# Patient Record
Sex: FEMALE | Race: WHITE | HISPANIC OR LATINO | Employment: OTHER | URBAN - METROPOLITAN AREA
[De-identification: names, ages, dates, MRNs, and addresses within clinical notes are randomized per-mention and may not be internally consistent; named-entity substitution may affect disease eponyms.]

---

## 2017-01-27 ENCOUNTER — HOSPITAL ENCOUNTER (OUTPATIENT)
Dept: RADIOLOGY | Facility: MEDICAL CENTER | Age: 62
End: 2017-01-27
Attending: OBSTETRICS & GYNECOLOGY
Payer: COMMERCIAL

## 2017-01-27 DIAGNOSIS — Z13.9 SCREENING: ICD-10-CM

## 2017-01-27 PROCEDURE — 77063 BREAST TOMOSYNTHESIS BI: CPT

## 2017-03-12 ENCOUNTER — OFFICE VISIT (OUTPATIENT)
Dept: URGENT CARE | Facility: PHYSICIAN GROUP | Age: 62
End: 2017-03-12
Payer: COMMERCIAL

## 2017-03-12 ENCOUNTER — HOSPITAL ENCOUNTER (OUTPATIENT)
Facility: MEDICAL CENTER | Age: 62
End: 2017-03-12
Attending: FAMILY MEDICINE
Payer: COMMERCIAL

## 2017-03-12 VITALS
HEART RATE: 70 BPM | TEMPERATURE: 99.2 F | DIASTOLIC BLOOD PRESSURE: 68 MMHG | OXYGEN SATURATION: 96 % | HEIGHT: 64 IN | BODY MASS INDEX: 21.68 KG/M2 | WEIGHT: 127 LBS | SYSTOLIC BLOOD PRESSURE: 100 MMHG | RESPIRATION RATE: 18 BRPM

## 2017-03-12 DIAGNOSIS — B35.4 TINEA CORPORIS: ICD-10-CM

## 2017-03-12 PROCEDURE — 99214 OFFICE O/P EST MOD 30 MIN: CPT | Performed by: FAMILY MEDICINE

## 2017-03-12 PROCEDURE — 87102 FUNGUS ISOLATION CULTURE: CPT

## 2017-03-12 PROCEDURE — 99000 SPECIMEN HANDLING OFFICE-LAB: CPT | Performed by: FAMILY MEDICINE

## 2017-03-12 RX ORDER — CLOTRIMAZOLE AND BETAMETHASONE DIPROPIONATE 10; .64 MG/G; MG/G
1 CREAM TOPICAL 2 TIMES DAILY
Qty: 1 TUBE | Refills: 0 | Status: SHIPPED | OUTPATIENT
Start: 2017-03-12 | End: 2017-05-26

## 2017-03-12 NOTE — MR AVS SNAPSHOT
"        Karuna Reid   3/12/2017 3:40 PM   Office Visit   MRN: [id number]    Department:  [facility]   Dept Phone:  [phone]    Description:  Female : 1955   Provider:  Ryan Valladares M.D.           Reason for Visit     Rash red bumps on left side of face since surgery in october, now rash on right side in [id number], doesn't hurt just there      Allergies as of 3/12/2017     Allergen Noted Reactions    Other Drug 2016   Rash    Steroids, caused redness and rash      You were diagnosed with     Tinea corporis   [970392]         Vital Signs     Blood Pressure Pulse Temperature Respirations Height Weight    100/68 mmHg 70 37.3 °C (99.2 °F) 18 1.626 m (5' 4.02\") 57.607 kg (127 lb)    Body Mass Index Oxygen Saturation Smoking Status             21.79 kg/m2 96% Never Smoker          Basic Information     Date Of Birth Sex Race Ethnicity Preferred Language    1955 Female White Non- English      Your appointments     2017  4:35 PM   Established Patient with TRACEY Gonzalez   [facility])    [address], [address]  [place] [address] [phone]           You will be receiving a confirmation call a few days before your appointment from our automated call confirmation system.              Problem List              ICD-10-CM Priority Class Noted - Resolved    Menopausal and postmenopausal disorder N95.9   Unknown - Present    Skin lesion of left arm L98.9   2014 - Present    Common wart B07.8   2014 - Present    Carpal tunnel syndrome G56.00   2014 - Present    Fatigue R53.83   2015 - Present    Uterine prolapse N81.4   2015 - Present    Routine health maintenance Z00.00   2015 - Present    Rectocele N81.6   10/19/2016 - Present    Cystocele N81.10   10/19/2016 - Present      Health Maintenance        Date Due Completion Dates    IMM INFLUENZA (1) 2016 ---    PAP SMEAR 2016 " 12/20/2013 (Prv Comp)    Override on 12/20/2013: Previously completed (abnormal - 3 month follow up,  Dr. Rowley  )    MAMMOGRAM 1/27/2018 1/27/2017, 1/18/2016, 1/19/2015, 1/20/2014, 1/21/2013, 1/16/2012, 1/17/2011, 12/10/2009, 12/10/2009, 12/9/2008, 12/9/2008, 12/4/2007, 12/4/2007, 11/14/2006, 10/19/2005    IMM DTaP/Tdap/Td Vaccine (2 - Td) 7/14/2020 7/14/2010    COLONOSCOPY 10/30/2020 10/30/2015, 9/9/2005            Current Immunizations     SHINGLES VACCINE 1/20/2014    Tdap Vaccine 7/14/2010      Below and/or attached are the medications your provider expects you to take. Review all of your home medications and newly ordered medications with your provider and/or pharmacist. Follow medication instructions as directed by your provider and/or pharmacist. Please keep your medication list with you and share with your provider. Update the information when medications are discontinued, doses are changed, or new medications (including over-the-counter products) are added; and carry medication information at all times in the event of emergency situations     Allergies:  OTHER DRUG - Rash               Medications  Valid as of: March 12, 2017 -  4:39 PM    Generic Name Brand Name Tablet Size Instructions for use    Ascorbic Acid (Tab) ascorbic acid 500 MG Take 500 mg by mouth every day.        Biotin (Cap) Biotin 5000 MCG Take 1 Cap by mouth every day.        Calcium Carb-Cholecalciferol (Tab) Calcium + D3 600-200 MG-UNIT Take 1 Tab by mouth every day.        Cholecalciferol (Tab) cholecalciferol 1000 UNIT Take 1,000 Units by mouth every day.        Clotrimazole-Betamethasone (Cream) LOTRISONE 1-0.05 % Apply 1 Application to affected area(s) 2 times a day.        Cyanocobalamin (Tab) VITAMIN B-12 500 MCG Take 500 mcg by mouth every day.        CycloSPORINE (Emulsion) RESTASIS 0.05 % Place 1 Drop in both eyes 2 times a day.        Lutein-Zeaxanthin (Cap) Lutein-Zeaxanthin 20-1 MG Take 1 Cap by mouth every day.         Multiple Vitamins-Minerals (Tab) WOMENS MULTI VITAMIN & MINERAL  Take 1 Tab by mouth every day.        NON SPECIFIED   Place 1 Drop in both eyes 3 times a day as needed. OTC eye drops alternating with Restasis        Omega-3 Fatty Acids (Cap) OMEGA 3 FA 1000 MG Take 1,000 mg by mouth every day.        .                 Medicines prescribed today were sent to:     [facility]/PHARMACY #[id number] - [place], [place] - [address]    [address] [place] [address] [place] [address]    Phone: [phone] Fax: [phone]    Open 24 Hours?: No      Medication refill instructions:       If your prescription bottle indicates you have medication refills left, it is not necessary to call your provider’s office. Please contact your pharmacy and they will refill your medication.    If your prescription bottle indicates you do not have any refills left, you may request refills at any time through one of the following ways: The online [employer] system (except Urgent Care), by calling your provider’s office, or by asking your pharmacy to contact your provider’s office with a refill request. Medication refills are processed only during regular business hours and may not be available until the next business day. Your provider may request additional information or to have a follow-up visit with you prior to refilling your medication.   *Please Note: Medication refills are assigned a new Rx number when refilled electronically. Your pharmacy may indicate that no refills were authorized even though a new prescription for the same medication is available at the pharmacy. Please request the medicine by name with the pharmacy before contacting your provider for a refill.        Your To Do List     Future Labs/Procedures Complete By Expires    FUNGAL CULTURE  As directed 9/12/2017         [employer] Access Code: Activation code not generated  Current Pumant Status: Active

## 2017-03-13 DIAGNOSIS — B35.4 TINEA CORPORIS: ICD-10-CM

## 2017-03-13 NOTE — PROGRESS NOTES
Chief Complaint   Patient presents with   • Rash     red bumps on left side of face since surgery in october, now rash on right side in [id number], doesn't hurt just there                Rash  This is a new problem. The current episode started in the past 7 days. The problem is unchanged.  Location: rt side of face. The rash is characterized by redness  . pt was exposed to nothing. Pertinent negatives include no itchiness,  cough, diarrhea, fatigue, fever, joint pain, rhinorrhea, shortness of breath or sore throat. Past treatments include: none.          Social History   Substance Use Topics   • Smoking status: Never Smoker    • Smokeless tobacco: Never Used      Comment: 1990   • Alcohol Use: Yes      Comment: 1 per week         Past Medical History   Diagnosis Date   • Menopausal and postmenopausal disorder    • GERD (gastroesophageal reflux disease)    • Urinary bladder disorder        Current Outpatient Prescriptions on File Prior to Visit   Medication Sig Dispense Refill   • NON SPECIFIED Place 1 Drop in both eyes 3 times a day as needed. OTC eye drops alternating with Restasis     • cyclosporin (RESTASIS) 0.05 % ophthalmic emulsion Place 1 Drop in both eyes 2 times a day.     • ascorbic acid (ASCORBIC ACID) 500 MG Tab Take 500 mg by mouth every day.     • Biotin 5000 MCG Cap Take 1 Cap by mouth every day.     • cyanocobalamin (VITAMIN B-12) 500 MCG Tab Take 500 mcg by mouth every day.     • Calcium Carb-Cholecalciferol (CALCIUM + D3) 600-200 MG-UNIT Tab Take 1 Tab by mouth every day.     • Lutein-Zeaxanthin 20-1 MG Cap Take 1 Cap by mouth every day.     • docosahexanoic acid (OMEGA 3 FA) 1000 MG CAPS Take 1,000 mg by mouth every day.     • vitamin D (CHOLECALCIFEROL) 1000 UNIT TABS Take 1,000 Units by mouth every day.     • Multiple Vitamins-Minerals (WOMENS MULTI VITAMIN & MINERAL) TABS Take 1 Tab by mouth every day.       No current facility-administered medications on file prior to visit.       Review of  "Systems   Constitutional: Negative for fever and fatigue.   HENT: Negative for rhinorrhea and sore throat.    Respiratory: Negative for cough and shortness of breath.    Cardiovascular: Negative for chest pain.   Gastrointestinal: Negative for diarrhea.   Musculoskeletal: Negative for joint pain.   Skin: Positive for rash.   Neurological: Negative for dizziness.   All other systems reviewed and are negative.         Objective:     Blood pressure 100/68, pulse 70, temperature 37.3 °C (99.2 °F), resp. rate 18, height 1.626 m (5' 4.02\"), weight 57.607 kg (127 lb), SpO2 96 %.      Physical Exam   Constitutional: pt is oriented to person, place, and time. Pt appears well-developed and well-nourished. No distress.   HENT:   Head: Normocephalic and atraumatic.   Eyes: Conjunctivae are normal.   Cardiovascular: Normal rate, regular rhythm and normal heart sounds.    Pulmonary/Chest: Effort normal and breath sounds normal. No respiratory distress. Pt has no wheezes. Pt has no rales.   Neurological: pt is alert and oriented to person, place, and time. No cranial nerve deficit.   Skin: Skin is warm. Rash (flat, erythematous scaly plaque (1), RIGHT CHEEK, aprox 1cm in diameter) noted. Pt is not diaphoretic. There is erythema.   Nursing note and vitals reviewed.              Assessment/Plan:     1. Tinea corporis  Trial of antifungal   - clotrimazole-betamethasone (LOTRISONE) 1-0.05 % Cream; Apply 1 Application to affected area(s) 2 times a day.  Dispense: 1 Tube; Refill: 0  - FUNGAL CULTURE; Future        1. Eczema     - triamcinolone acetonide (KENALOG) 0.1 % Ointment; Apply 1 Application to affected area(s) 2 times a day.  Dispense: 1 Tube; Refill: 0           "

## 2017-04-10 LAB
FUNGUS SPEC CULT: NORMAL
SIGNIFICANT IND 70042: NORMAL
SOURCE SOURCE: NORMAL

## 2017-05-23 ENCOUNTER — RX ONLY (OUTPATIENT)
Age: 62
Setting detail: RX ONLY
End: 2017-05-23

## 2017-05-25 ENCOUNTER — TELEPHONE (OUTPATIENT)
Dept: MEDICAL GROUP | Facility: PHYSICIAN GROUP | Age: 62
End: 2017-05-25

## 2017-05-25 NOTE — Clinical Note
[email or web address]  Fabrice Carroll D.O.  [address]  [place] [address]  Fax: [phone]   Authorization for Release/Disclosure of   Protected Health Information   Name: KARUNA ABRAHAM : 1955 SSN: [id number]   Address: [address] [address]  [place] [address] Phone:    [phone] (home) [phone] (work)   I authorize the entity listed below to release/disclose the PHI below to:   [email or web address] D.O. and Fabrice Carroll D.O.   Provider or Entity Name:  Lenny Cazares M.D.     Address   City, State, Zip   Phone:      Fax:  [phone]   Reason for request: continuity of care   Information to be released:    [  ] LAST COLONOSCOPY,  including any PATH REPORT and follow-up  [  ] LAST FIT/COLOGUARD RESULT [  ] LAST DEXA  [  ] LAST MAMMOGRAM  [ XXX ] LAST PAP  [  ] LAST LABS [  ] RETINA EXAM REPORT  [  ] IMMUNIZATION RECORDS  [  ] Release all info      [  ] Check here and initial the line next to each item to release ALL health information INCLUDING  _____ Care and treatment for drug and / or alcohol abuse  _____ HIV testing, infection status, or AIDS  _____ Genetic Testing    DATES OF SERVICE OR TIME PERIOD TO BE DISCLOSED: _____________  I understand and acknowledge that:  * This Authorization may be revoked at any time by you in writing, except if your health information has already been used or disclosed.  * Your health information that will be used or disclosed as a result of you signing this authorization could be re-disclosed by the recipient. If this occurs, your re-disclosed health information may no longer be protected by State or Federal laws.  * You may refuse to sign this Authorization. Your refusal will not affect your ability to obtain treatment.  * This Authorization becomes effective upon signing and will  on (date) __________.      If no date is indicated, this Authorization will  one (1) year from the signature date.    Name: Karuna Rivera  Jeanette    Signature: For continuity of care.    Date:     5/25/2017       PLEASE FAX REQUESTED RECORDS BACK TO: [phone]

## 2017-05-25 NOTE — TELEPHONE ENCOUNTER
ESTABLISHED PATIENT PRE-VISIT PLANNING     Note: Patient will not be contacted if there is no indication to call.     1.  Reviewed note from last office visit with PCP and/or other med group provider: Yes    2.  If any orders were placed at last visit, do we have Results/Consult Notes?        •  Labs - Labs were not ordered at last office visit.       •  Imaging - Imaging was not ordered at last office visit.       •  Referrals - No referrals were ordered at last office visit.    3.  Immunizations were updated in [facility] using [employer]?: Yes       •  [employer] Recommendations: Patient is up to date on all vaccines    4.  Patient is due for the following Health Maintenance Topics:   Health Maintenance Due   Topic Date Due   • PAP SMEAR  12/20/2016       Requested medical records regarding pap from Lenny Cazares M.D.. This was faxed via [employer] on 5/25/2017 12:22 PM to: [phone]    5.  Patient was not informed to arrive 15 min prior to their scheduled appointment and bring in their medication bottles.

## 2017-05-26 ENCOUNTER — OFFICE VISIT (OUTPATIENT)
Dept: MEDICAL GROUP | Facility: PHYSICIAN GROUP | Age: 62
End: 2017-05-26
Payer: COMMERCIAL

## 2017-05-26 VITALS
TEMPERATURE: 99.3 F | RESPIRATION RATE: 16 BRPM | DIASTOLIC BLOOD PRESSURE: 64 MMHG | OXYGEN SATURATION: 97 % | BODY MASS INDEX: 20.83 KG/M2 | SYSTOLIC BLOOD PRESSURE: 110 MMHG | HEART RATE: 84 BPM | WEIGHT: 122 LBS | HEIGHT: 64 IN

## 2017-05-26 DIAGNOSIS — Z13.6 SCREENING FOR CARDIOVASCULAR CONDITION: ICD-10-CM

## 2017-05-26 DIAGNOSIS — Z00.00 ANNUAL PHYSICAL EXAM: ICD-10-CM

## 2017-05-26 DIAGNOSIS — N32.89 BLADDER SPASM: ICD-10-CM

## 2017-05-26 PROCEDURE — 99396 PREV VISIT EST AGE 40-64: CPT | Performed by: FAMILY MEDICINE

## 2017-05-26 ASSESSMENT — PATIENT HEALTH QUESTIONNAIRE - PHQ9: CLINICAL INTERPRETATION OF PHQ2 SCORE: 0

## 2017-05-26 NOTE — PROGRESS NOTES
Subjective:     Chief Complaint   Patient presents with   • Annual Exam       Karuna Reid is a 61 y.o. female here today to [facility] and for annual exam.    She reports urinary symptoms since hysterectomy with bladder sling. Pt reports 1-2 episodes of nocturia a night.  Pt reports feeling that she can't fully empty the her bladder. Patient also experiences bladder pressure. She notes occasional slight incontinence. She denies stress incontinence, overflow incontinence, or urgency incontinence. She denies dysuria, hematuria, fevers, chills, or flank pain.    Allergies   Allergen Reactions   • Other Drug Rash     Steroids, caused redness and rash     Current medicines (including changes today)  Current Outpatient Prescriptions   Medication Sig Dispense Refill   • cyclosporin (RESTASIS) 0.05 % ophthalmic emulsion Place 1 Drop in both eyes 2 times a day.       No current facility-administered medications for this visit.     Social History   Substance Use Topics   • Smoking status: Former Smoker -- 1.00 packs/day for 23 years     Types: Cigarettes     Start date: 01/01/1970     Quit date: 01/01/1993   • Smokeless tobacco: Never Used      Comment: continue to avoid   • Alcohol Use: 0.6 oz/week     1 Standard drinks or equivalent per week     Family Status   Relation Status Death Age   • Mother Alive      hypothyroid     Family History   Problem Relation Age of Onset   • Psychiatry Mother      Alzheimer's   • Cancer Maternal Aunt      breast cancer     She    has a past medical history of Menopausal and postmenopausal disorder and Urinary bladder disorder.        ROS   GEN: no weight loss, fevers, or chills  HEENT: no blurry vision or change in vision, no ear pain, no difficulty swallowing, no throat pain, no runny nose, no nasal congestion  Resp: no shortness of breath, no cough  CV: no racing heart, no irregular beats, no chest pain  Abd: no nausea, no vomiting, no diarrhea, no constipation, no blood in stool, no  "dark stools, no incontinence  : no dysuria, no hematuria,, no increased frequency  MSK: no muscle aches, no joint pain, no limited motion  Neuro: no headaches, no dizziness, no LOC, no weakness, no numbness/tingling       Objective:     Blood pressure 110/64, pulse 84, temperature 37.4 °C (99.3 °F), resp. rate 16, height 1.626 m (5' 4.02\"), weight 55.339 kg (122 lb), SpO2 97 %. Body mass index is 20.93 kg/(m^2).   Physical Exam:  Constitutional: Alert, no distress.  Skin: Warm, dry, good turgor, no rashes in visible areas.  Eye: Equal, round and reactive, conjunctiva clear, lids normal.  ENMT: Lips without lesions, oropharynx non-erythematous, no exudate, moist oral mucosa, bilateral tympanic membranes: No bulging, no retraction, no fluid, nonerythematous, positive light reflex, external auditory canals: Clear, scant cerumen, nonerythematous  Neck: Trachea midline, no masses, no thyromegaly. No cervical or supraclavicular lymphadenopathy. Full ROM, surgical scars  Respiratory: Unlabored respiratory effort, good air movement, lungs clear to auscultation, no wheezes, no ronchi.  Cardiovascular:RRR, +S1, S2, no murmur, no peripheral edema, pedal and radial pulses equal and intact bilat  Abdomen: Soft, non-tender, no masses, no hepatosplenomegaly.  MSK:5/5 muscle strength in upper extremities as well as lower extremity bilaterally  Psych: Alert and oriented x3, appropriate affect and mood.  Neuro: CN2-12 intact, no gross motor or sensory deficits      Assessment and Plan:   The following treatment plan was discussed    1. Annual physical exam      2. Bladder spasm  Recommend home exercises to improve pelvic floor.    3. Screening for cardiovascular condition  - LIPID PROFILE; Future      Followup: Return in about 1 year (around 5/26/2018) for Annual.    Please note that this dictation was created using voice recognition software. I have made every reasonable attempt to correct obvious errors, but I expect that there " are errors of grammar and possibly content that I did not discover before finalizing the note.

## 2017-05-26 NOTE — MR AVS SNAPSHOT
"        Karuna Reid   2017 4:00 PM   Office Visit   MRN: [id number]    Department:  [facility]   Dept Phone:  [phone]    Description:  Female : 1955   Provider:  Francisca Shanks D.O.           Reason for Visit     Establish Care           Allergies as of 2017     Allergen Noted Reactions    Other Drug 2016   Rash    Steroids, caused redness and rash      You were diagnosed with     Bladder spasm   [368844]       Screening for cardiovascular condition   [247174]         Vital Signs     Blood Pressure Pulse Temperature Respirations Height Weight    110/64 mmHg 84 37.4 °C (99.3 °F) 16 1.626 m (5' 4.02\") 55.339 kg (122 lb)    Body Mass Index Oxygen Saturation Smoking Status             20.93 kg/m2 97% Former Smoker         Basic Information     Date Of Birth Sex Race Ethnicity Preferred Language    1955 Female White Non- English      Problem List              ICD-10-CM Priority Class Noted - Resolved    Menopausal and postmenopausal disorder N95.9   Unknown - Present    Carpal tunnel syndrome G56.00   2014 - Present    Fatigue R53.83   2015 - Present      Health Maintenance        Date Due Completion Dates    MAMMOGRAM 2018, 2016, 2015, 2014, 2013, 2012, 2011, 12/10/2009, 12/10/2009, 2008, 2008, 2007, 2007, 2006, 10/19/2005    IMM DTaP/Tdap/Td Vaccine (2 - Td) 2020    COLONOSCOPY 10/30/2020 10/30/2015, 2005            Current Immunizations     SHINGLES VACCINE 2014    Tdap Vaccine 2010      Below and/or attached are the medications your provider expects you to take. Review all of your home medications and newly ordered medications with your provider and/or pharmacist. Follow medication instructions as directed by your provider and/or pharmacist. Please keep your medication list with you and share with your provider. Update the information when medications are " discontinued, doses are changed, or new medications (including over-the-counter products) are added; and carry medication information at all times in the event of emergency situations     Allergies:  OTHER DRUG - Rash               Medications  Valid as of: May 26, 2017 -  5:21 PM    Generic Name Brand Name Tablet Size Instructions for use    CycloSPORINE (Emulsion) RESTASIS 0.05 % Place 1 Drop in both eyes 2 times a day.        .                 Medicines prescribed today were sent to:     [facility]/PHARMACY #[id number] - [place], [place] - [address]    [address] [place] [address]    Phone: [phone] Fax: [phone]    Open 24 Hours?: No      Medication refill instructions:       If your prescription bottle indicates you have medication refills left, it is not necessary to call your provider’s office. Please contact your pharmacy and they will refill your medication.    If your prescription bottle indicates you do not have any refills left, you may request refills at any time through one of the following ways: The online [employer] system (except Urgent Care), by calling your provider’s office, or by asking your pharmacy to contact your provider’s office with a refill request. Medication refills are processed only during regular business hours and may not be available until the next business day. Your provider may request additional information or to have a follow-up visit with you prior to refilling your medication.   *Please Note: Medication refills are assigned a new Rx number when refilled electronically. Your pharmacy may indicate that no refills were authorized even though a new prescription for the same medication is available at the pharmacy. Please request the medicine by name with the pharmacy before contacting your provider for a refill.        Your To Do List     Future Labs/Procedures Complete By Expires    LIPID PROFILE  As directed 5/26/2018         [employer] Access Code: Activation code not generated  Current  MyChart Status: Active

## 2017-06-17 ENCOUNTER — HOSPITAL ENCOUNTER (OUTPATIENT)
Dept: LAB | Facility: MEDICAL CENTER | Age: 62
End: 2017-06-17
Attending: FAMILY MEDICINE
Payer: COMMERCIAL

## 2017-06-17 DIAGNOSIS — Z13.6 SCREENING FOR CARDIOVASCULAR CONDITION: ICD-10-CM

## 2017-06-17 LAB
CHOLEST SERPL-MCNC: 175 MG/DL (ref 100–199)
HDLC SERPL-MCNC: 68 MG/DL
LDLC SERPL CALC-MCNC: 92 MG/DL
TRIGL SERPL-MCNC: 76 MG/DL (ref 0–149)

## 2017-06-17 PROCEDURE — 36415 COLL VENOUS BLD VENIPUNCTURE: CPT

## 2017-06-17 PROCEDURE — 80061 LIPID PANEL: CPT

## 2017-09-28 ENCOUNTER — APPOINTMENT (RX ONLY)
Dept: URBAN - METROPOLITAN AREA CLINIC 4 | Facility: CLINIC | Age: 62
Setting detail: DERMATOLOGY
End: 2017-09-28

## 2017-09-28 DIAGNOSIS — D22 MELANOCYTIC NEVI: ICD-10-CM

## 2017-09-28 DIAGNOSIS — L81.4 OTHER MELANIN HYPERPIGMENTATION: ICD-10-CM

## 2017-09-28 DIAGNOSIS — L82.1 OTHER SEBORRHEIC KERATOSIS: ICD-10-CM

## 2017-09-28 PROBLEM — D48.5 NEOPLASM OF UNCERTAIN BEHAVIOR OF SKIN: Status: ACTIVE | Noted: 2017-09-28

## 2017-09-28 PROCEDURE — ? COUNSELING

## 2017-09-28 PROCEDURE — ? BIOPSY BY SHAVE METHOD

## 2017-09-28 PROCEDURE — 99212 OFFICE O/P EST SF 10 MIN: CPT | Mod: 25

## 2017-09-28 PROCEDURE — 11100: CPT

## 2017-09-28 ASSESSMENT — LOCATION SIMPLE DESCRIPTION DERM
LOCATION SIMPLE: LEFT THIGH
LOCATION SIMPLE: LEFT POSTERIOR THIGH
LOCATION SIMPLE: RIGHT POSTERIOR THIGH

## 2017-09-28 ASSESSMENT — LOCATION DETAILED DESCRIPTION DERM
LOCATION DETAILED: LEFT ANTERIOR PROXIMAL THIGH
LOCATION DETAILED: LEFT ANTERIOR DISTAL THIGH
LOCATION DETAILED: RIGHT PROXIMAL POSTERIOR THIGH
LOCATION DETAILED: LEFT DISTAL POSTERIOR THIGH

## 2017-09-28 ASSESSMENT — LOCATION ZONE DERM: LOCATION ZONE: LEG

## 2017-09-28 NOTE — HPI: SKIN LESIONS
Is This A New Presentation, Or A Follow-Up?: Skin Lesion
Have Your Skin Lesions Been Treated?: not been treated
Additional History: When lesion first appeared it was white in color. Now seems to have a dark spot on lesion

## 2017-09-28 NOTE — PROCEDURE: BIOPSY BY SHAVE METHOD
Destruction After The Procedure: Yes
Billing Type: Third-Party Bill
Render Post-Care Instructions In Note?: no
Notification Instructions: Patient will be notified of biopsy results. However, patient instructed to call the office if not contacted within 2 weeks.
Lab: 253
Additional Anesthesia Volume In Cc (Will Not Render If 0): 0
Post-Care Instructions: I reviewed with the patient in detail post-care instructions. Patient is to keep the biopsy site dry overnight, and then apply bacitracin twice daily until healed. Patient may apply hydrogen peroxide soaks to remove any crusting.
Dressing: Band-Aid
Detail Level: Detailed
Wound Care: Petrolatum
Biopsy Method: Personna blade
Curettage Text: The wound bed was treated with curettage after the biopsy was performed.
Cryotherapy Text: The wound bed was treated with cryotherapy after the biopsy was performed.
Anesthesia Volume In Cc: 0.5
Biopsy Type: H and E
Electrodesiccation And Curettage Text: The wound bed was treated with electrodesiccation and curettage after the biopsy was performed.
Body Location Override (Optional - Billing Will Still Be Based On Selected Body Map Location If Applicable): left ant thigh
Lab Facility: 
Electrodesiccation Text: The wound bed was treated with electrodesiccation after the biopsy was performed.
Hemostasis: Electrodesiccation and Aluminum Chloride
Type Of Destruction Used: Curettage
Silver Nitrate Text: The wound bed was treated with silver nitrate after the biopsy was performed.
Anesthesia Type: 1% lidocaine with epinephrine and a 1:10 solution of 8.4% sodium bicarbonate
Consent: Written consent was obtained and risks were reviewed including but not limited to scarring, infection, bleeding, scabbing, incomplete removal, nerve damage and allergy to anesthesia.

## 2017-10-11 ENCOUNTER — APPOINTMENT (OUTPATIENT)
Dept: SOCIAL WORK | Facility: CLINIC | Age: 62
End: 2017-10-11
Payer: COMMERCIAL

## 2017-10-11 PROCEDURE — 90686 IIV4 VACC NO PRSV 0.5 ML IM: CPT | Performed by: REGISTERED NURSE

## 2017-10-11 PROCEDURE — 90471 IMMUNIZATION ADMIN: CPT | Performed by: REGISTERED NURSE

## 2018-01-22 ENCOUNTER — OFFICE VISIT (OUTPATIENT)
Dept: MEDICAL GROUP | Facility: MEDICAL CENTER | Age: 63
End: 2018-01-22
Payer: COMMERCIAL

## 2018-01-22 VITALS
OXYGEN SATURATION: 96 % | WEIGHT: 132 LBS | DIASTOLIC BLOOD PRESSURE: 64 MMHG | SYSTOLIC BLOOD PRESSURE: 124 MMHG | RESPIRATION RATE: 16 BRPM | TEMPERATURE: 98.5 F | BODY MASS INDEX: 22.53 KG/M2 | HEART RATE: 64 BPM | HEIGHT: 64 IN

## 2018-01-22 DIAGNOSIS — M79.605 PAIN OF LEFT LOWER EXTREMITY: ICD-10-CM

## 2018-01-22 DIAGNOSIS — M54.2 NECK PAIN: ICD-10-CM

## 2018-01-22 DIAGNOSIS — M85.80 OSTEOPENIA, UNSPECIFIED LOCATION: ICD-10-CM

## 2018-01-22 PROCEDURE — 99214 OFFICE O/P EST MOD 30 MIN: CPT | Performed by: INTERNAL MEDICINE

## 2018-01-22 RX ORDER — DOCUSATE SODIUM 100 MG/1
100 CAPSULE, LIQUID FILLED ORAL 2 TIMES DAILY
COMMUNITY
End: 2020-07-14

## 2018-01-22 NOTE — PROGRESS NOTES
"CC: Neck pain, leg pain.    HPI:   Karuna presents today with the following.    1. Pain of left lower extremity  Presents complaining of leg pain. She reports left thigh only happening for seconds once per month. No specific relationship to the pain and again is not frequent nor is it severe.    2. Osteopenia, unspecified location  Recently diagnosed with osteopenia bone density by her gynecologist and she was referred to a specialist which she is seeing tomorrow. Unclear what the previous values were.    3. Neck pain  She is complaining of rather persistent neck pain. Denies any specific injury. 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 Denies any numbness or tingling in her extremities and no weakness.    ROS: Denies Chest pain, SOB, LE edema.    Patient Active Problem List    Diagnosis Date Noted   • Osteopenia 01/22/2018   • Fatigue 08/05/2015   • Carpal tunnel syndrome 08/14/2014   • Menopausal and postmenopausal disorder        Current Outpatient Prescriptions   Medication Sig Dispense Refill   • docusate sodium (COLACE) 100 MG Cap Take 100 mg by mouth 2 times a day.     • cyclosporin (RESTASIS) 0.05 % ophthalmic emulsion Place 1 Drop in both eyes 2 times a day.       No current facility-administered medications for this visit.          Allergies as of 01/22/2018 - Reviewed 01/22/2018   Allergen Reaction Noted   • Other drug Rash 09/29/2016        ROS: As per HPI.    /64   Pulse 64   Temp 36.9 °C (98.5 °F)   Resp 16   Ht 1.626 m (5' 4\")   Wt 59.9 kg (132 lb)   SpO2 96%   BMI 22.66 kg/m²      Physical Exam:  Gen:         Alert and oriented, No apparent distress.  Neck:        No Lymphadenopathy or Bruits.  Lungs:     Clear to auscultation bilaterally  CV:          Regular rate and rhythm. No murmurs, rubs or gallops.               Ext:          No clubbing, cyanosis, edema.      Assessment and Plan.   62 y.o. female with the following issues.    1. Pain of left lower " extremity  Unclear what the etiology is but very infrequent no further workup or treatment unless becomes worse.    2. Osteopenia, unspecified location  Will get previous imaging results.    3. Neck pain  Recommended physical therapy and topical anti-inflammatories if not improving.  - REFERRAL TO PHYSICAL THERAPY Reason for Therapy: Eval/Treat/Report

## 2018-01-25 ENCOUNTER — HOSPITAL ENCOUNTER (OUTPATIENT)
Dept: LAB | Facility: MEDICAL CENTER | Age: 63
End: 2018-01-25
Attending: NURSE PRACTITIONER
Payer: COMMERCIAL

## 2018-01-25 LAB
25(OH)D3 SERPL-MCNC: 37 NG/ML (ref 30–100)
ALBUMIN SERPL BCP-MCNC: 4.4 G/DL (ref 3.2–4.9)
ALBUMIN/GLOB SERPL: 1.4 G/DL
ALP SERPL-CCNC: 59 U/L (ref 30–99)
ALT SERPL-CCNC: 20 U/L (ref 2–50)
ANION GAP SERPL CALC-SCNC: 6 MMOL/L (ref 0–11.9)
AST SERPL-CCNC: 22 U/L (ref 12–45)
BASOPHILS # BLD AUTO: 0.5 % (ref 0–1.8)
BASOPHILS # BLD: 0.03 K/UL (ref 0–0.12)
BILIRUB SERPL-MCNC: 0.7 MG/DL (ref 0.1–1.5)
BUN SERPL-MCNC: 17 MG/DL (ref 8–22)
CALCIUM SERPL-MCNC: 9.9 MG/DL (ref 8.5–10.5)
CHLORIDE SERPL-SCNC: 105 MMOL/L (ref 96–112)
CO2 SERPL-SCNC: 29 MMOL/L (ref 20–33)
CREAT SERPL-MCNC: 0.71 MG/DL (ref 0.5–1.4)
EOSINOPHIL # BLD AUTO: 0.11 K/UL (ref 0–0.51)
EOSINOPHIL NFR BLD: 2 % (ref 0–6.9)
ERYTHROCYTE [DISTWIDTH] IN BLOOD BY AUTOMATED COUNT: 43.8 FL (ref 35.9–50)
GLOBULIN SER CALC-MCNC: 3.1 G/DL (ref 1.9–3.5)
GLUCOSE SERPL-MCNC: 85 MG/DL (ref 65–99)
HCT VFR BLD AUTO: 46 % (ref 37–47)
HGB BLD-MCNC: 15.5 G/DL (ref 12–16)
IMM GRANULOCYTES # BLD AUTO: 0.02 K/UL (ref 0–0.11)
IMM GRANULOCYTES NFR BLD AUTO: 0.4 % (ref 0–0.9)
LYMPHOCYTES # BLD AUTO: 2.17 K/UL (ref 1–4.8)
LYMPHOCYTES NFR BLD: 38.6 % (ref 22–41)
MCH RBC QN AUTO: 31 PG (ref 27–33)
MCHC RBC AUTO-ENTMCNC: 33.7 G/DL (ref 33.6–35)
MCV RBC AUTO: 92 FL (ref 81.4–97.8)
MONOCYTES # BLD AUTO: 0.47 K/UL (ref 0–0.85)
MONOCYTES NFR BLD AUTO: 8.4 % (ref 0–13.4)
NEUTROPHILS # BLD AUTO: 2.82 K/UL (ref 2–7.15)
NEUTROPHILS NFR BLD: 50.1 % (ref 44–72)
NRBC # BLD AUTO: 0 K/UL
NRBC BLD-RTO: 0 /100 WBC
PLATELET # BLD AUTO: 173 K/UL (ref 164–446)
PMV BLD AUTO: 11.3 FL (ref 9–12.9)
POTASSIUM SERPL-SCNC: 4.1 MMOL/L (ref 3.6–5.5)
PROT SERPL-MCNC: 7.5 G/DL (ref 6–8.2)
PTH-INTACT SERPL-MCNC: 62.4 PG/ML (ref 14–72)
RBC # BLD AUTO: 5 M/UL (ref 4.2–5.4)
SODIUM SERPL-SCNC: 140 MMOL/L (ref 135–145)
WBC # BLD AUTO: 5.6 K/UL (ref 4.8–10.8)

## 2018-01-25 PROCEDURE — 85025 COMPLETE CBC W/AUTO DIFF WBC: CPT

## 2018-01-25 PROCEDURE — 36415 COLL VENOUS BLD VENIPUNCTURE: CPT

## 2018-01-25 PROCEDURE — 82306 VITAMIN D 25 HYDROXY: CPT

## 2018-01-25 PROCEDURE — 80053 COMPREHEN METABOLIC PANEL: CPT

## 2018-01-25 PROCEDURE — 83970 ASSAY OF PARATHORMONE: CPT

## 2018-01-26 ENCOUNTER — PHYSICAL THERAPY (OUTPATIENT)
Dept: PHYSICAL THERAPY | Facility: REHABILITATION | Age: 63
End: 2018-01-26
Attending: INTERNAL MEDICINE
Payer: COMMERCIAL

## 2018-01-26 DIAGNOSIS — M54.2 NECK PAIN: ICD-10-CM

## 2018-01-26 PROCEDURE — 97014 ELECTRIC STIMULATION THERAPY: CPT

## 2018-01-26 PROCEDURE — 97161 PT EVAL LOW COMPLEX 20 MIN: CPT

## 2018-01-26 ASSESSMENT — ENCOUNTER SYMPTOMS
PAIN SCALE AT HIGHEST: 4
PAIN SCALE AT LOWEST: 0
EXACERBATED BY: KEYBOARDING
ALLEVIATING FACTORS: SITTING DOWN
ALLEVIATING FACTORS: PAIN MEDICATION
QUALITY: PRESSURE
EXACERBATED BY: SITTING
PAIN SCALE: 1
QUALITY: DULL ACHE

## 2018-01-26 NOTE — OP THERAPY EVALUATION
Outpatient Physical Therapy  INITIAL EVALUATION    [facility] Physical Therapy [address]  [address]  [place] [address]  Phone:  [phone]  Fax:  [phone]    Date of Evaluation: 2018    Patient: Karuna Reid  YOB: 1955  MRN: [id number]     Referring Provider: Lenny Alves M.D.  [address]  [address]  [place] [address]   Referring Diagnosis Neck pain [M54.2]     Time Calculation  Start time: 930  Stop time:  Time Calculation (min): 75 minutes     Physical Therapy Occurrence Codes    Date of onset of impairment:  17   Date physical therapy care plan established or reviewed:  18          Chief Complaint: Neck Pain    Visit Diagnoses     ICD-10-CM   1. Neck pain M54.2         Subjective:   History of Present Illness:     Mechanism of injury:  Pt w/onset of C/S pain about 2 months ago w/headache that feels more in front and behind eyes. Pt reports intermittent chronic C/S on the L, that is sharper. Pt thinks she may have carpal tunnel R hand, sxs started a few years ago then resolve, now worse w/additional computer/mouse work. Sxs start in middle finger and can radiate into forearm. Currently just odd sensations.  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  Headaches:  tension headaches  Sleep disturbance:  Not disrupted  Pain:     Current pain ratin    At best pain ratin    At worst pain ratin    Location:  R C/S    Quality:  Pressure and dull ache    Relieving factors:  Pain medication and sitting down    Aggravating factors:  Keyboarding and sitting (if more slumped)      Past Medical History:   Diagnosis Date   • Menopausal and postmenopausal disorder    • Urinary bladder disorder      Past Surgical History:   Procedure Laterality Date   • HYSTERECTOMY ROBOTIC  10/19/2016    Procedure: HYSTERECTOMY ROBOTIC Total with BILATERAL SALPINGO-OOPHORECTOMY;  Surgeon: Lenny Cazares M.D.;  Location: SURGERY [facility]  TOWER ORS;  Service:    • ANTERIOR AND POSTERIOR REPAIR  10/19/2016    Procedure: POSTERIOR REPAIR;  Surgeon: Lenny Cazares M.D.;  Location: SURGERY [facility];  Service:    • VAGINAL SUSPENSION  10/19/2016    Procedure: VAGINAL SUSPENSION Uterosacral, and  McCalls Culdoplasty;  Surgeon: Lenny Cazares M.D.;  Location: SURGERY [facility];  Service:    • CYSTOSCOPY  10/19/2016    Procedure: CYSTOSCOPY;  Surgeon: Lenny Cazares M.D.;  Location: SURGERY [facility];  Service:    • TONSILLECTOMY  4/18/2014    Performed by Nirali Dos Santos M.D. at SURGERY [facility]   • OTHER SURGICAL PROCEDURE  1989    Dr Doss; Neck superficial exploration   • TONSILLECTOMY  1959     Social History   Substance Use Topics   • Smoking status: Former Smoker     Packs/day: 1.00     Years: 23.00     Types: Cigarettes     Start date: 1/1/1970     Quit date: 1/1/1993   • Smokeless tobacco: Never Used      Comment: continue to avoid   • Alcohol use 0.6 oz/week     1 Standard drinks or equivalent per week     Family and Occupational History     Social History   • Marital status:      Spouse name: N/A   • Number of children: N/A   • Years of education: N/A       Objective     Neurological Testing     Myotome testing   Cervical (left)   All left cervical myotomes within normal limits    Cervical (right)   All right cervical myotomes within normal limits    Active Range of Motion     Cervical Spine   Left rotation: Active left cervical rotation: 65 deg.  Right rotation: Active right cervical rotation: 65 deg.    Upper Cervical   Flexion: decreased    Strength:      Left Wrist/Hand    (2nd hand position)     Trial 1: 40    Trial 2: 50    Trial 3: 50    Average: 50    Right Wrist/Hand    (2nd hand position)     Trial 1: 40    Trial 2: 40    Trial 3: 45    Average: 41.67    Tests     Additional testing details: Negative Phalens and Rev Phalens bilaterally        Therapeutic Treatments and Modalities:      1. E Stim Unattended (CPT 51130), 15 minutes, IFC and MHP to C/S      Assessment, Response and Plan:   Assessment details:  62 yr old female w/subacute c/s pain consistent w/mild derangement and overuse of upper superficial c/s muscles. Skilled PT indicated to address the following goals.  Goals:   Short Term Goals:   Pt able to self-correct seated posture  Pt to report 50% decreased c/o pain using computer at work  Pt to report 25% decrease in HA  Short term goal time span:  2-4 weeks      Long Term Goals:    Pt to report 80% decreased c/o pain using computer at work.  Pt able to maintain posture >80% during ther ex  Pt to report 75% decrease in FRANCO  Long term goal time span:  4-6 weeks    Plan:   Therapy options:  Physical therapy treatment to continue  Planned therapy interventions:  E Stim Unattended (CPT 22733), Manual Therapy (CPT 56633), Neuromuscular Re-education (CPT 63653), Therapeutic Exercise (CPT 77731) and Mechanical Traction (CPT 56887)  Frequency:  2x week  Duration in weeks:  12  Plan details:  Cont skilled PT to address postural dysfunction, deep c/s and scapular weakness.      Referring provider co-signature:  I have reviewed this plan of care and my co-signature certifies the need for services.      Physician Signature: ________________________________ Date: ______________

## 2018-02-15 ENCOUNTER — HOSPITAL ENCOUNTER (OUTPATIENT)
Dept: RADIOLOGY | Facility: MEDICAL CENTER | Age: 63
End: 2018-02-15
Attending: OBSTETRICS & GYNECOLOGY
Payer: COMMERCIAL

## 2018-02-15 DIAGNOSIS — Z12.31 VISIT FOR SCREENING MAMMOGRAM: ICD-10-CM

## 2018-02-15 PROCEDURE — 77063 BREAST TOMOSYNTHESIS BI: CPT

## 2018-02-22 ENCOUNTER — PHYSICAL THERAPY (OUTPATIENT)
Dept: PHYSICAL THERAPY | Facility: REHABILITATION | Age: 63
End: 2018-02-22
Attending: INTERNAL MEDICINE
Payer: COMMERCIAL

## 2018-02-22 DIAGNOSIS — M54.2 NECK PAIN: ICD-10-CM

## 2018-02-22 PROCEDURE — 97110 THERAPEUTIC EXERCISES: CPT

## 2018-02-22 PROCEDURE — 97014 ELECTRIC STIMULATION THERAPY: CPT

## 2018-02-22 PROCEDURE — 97140 MANUAL THERAPY 1/> REGIONS: CPT

## 2018-02-23 NOTE — OP THERAPY DAILY TREATMENT
"  Outpatient Physical Therapy  DAILY TREATMENT     [facility] Physical Therapy [address].  [address]  [place] [address]  Phone:  [phone]  Fax:  [phone]    Date: 02/22/2018    Patient: Karuna Reid  YOB: 1955  MRN: [id number]     Time Calculation  Start time: 1630  Stop time: 1720 Time Calculation (min): 50 minutes     Chief Complaint: Neck Pain    Visit #: 2    SUBJECTIVE:  Pt reports having a pretty stressful day. Pt currently reports pressure on top of the head.    OBJECTIVE:       Therapeutic Exercises (CPT 10491):     1. Foam Roller, pec, alt UE, angels, x10 min    2. [place] at wall on 1/2 foam, x30    Therapeutic Treatments and Modalities:     1. Manual Therapy (CPT 20327), Subocc release, STM UT, LS, rhomboids, pecs, scalenes, 1st rib caudal and ant glides, CT mobs    Time-based treatments/modalities:  Manual therapy minutes (CPT 58359): 20 minutes  Therapeutic exercise minutes (CPT 68539): 10 minutes       Pain rating before treatment: \"pressure only\"  Pain rating after treatment: 0    ASSESSMENT:   Pt w/flattened T/S curvature and poor activation of deep c/s flexors    PLAN/RECOMMENDATIONS:   Plan for treatment: therapy treatment to continue next visit.  Planned interventions for next visit: continue with current treatment.  Progress deep c/s flex and scap stab.      "

## 2018-03-01 ENCOUNTER — TELEPHONE (OUTPATIENT)
Dept: MEDICAL GROUP | Facility: MEDICAL CENTER | Age: 63
End: 2018-03-01

## 2018-03-01 ENCOUNTER — PHYSICAL THERAPY (OUTPATIENT)
Dept: PHYSICAL THERAPY | Facility: REHABILITATION | Age: 63
End: 2018-03-01
Attending: INTERNAL MEDICINE
Payer: COMMERCIAL

## 2018-03-01 DIAGNOSIS — Z12.83 SCREENING FOR SKIN CANCER: ICD-10-CM

## 2018-03-01 DIAGNOSIS — M79.643 PAIN OF HAND, UNSPECIFIED LATERALITY: ICD-10-CM

## 2018-03-01 DIAGNOSIS — M54.2 NECK PAIN: ICD-10-CM

## 2018-03-01 PROCEDURE — 97014 ELECTRIC STIMULATION THERAPY: CPT

## 2018-03-01 PROCEDURE — 97140 MANUAL THERAPY 1/> REGIONS: CPT

## 2018-03-01 PROCEDURE — 97110 THERAPEUTIC EXERCISES: CPT

## 2018-03-01 NOTE — TELEPHONE ENCOUNTER
1. Caller Name: Karuna                                         Call Back Number: [phone]      Patient approves a detailed voicemail message: N\A    2. SPECIFIC Action To Be Taken: Referral pending, please sign.    3. Diagnosis/Clinical Reason for Request: Skin Cancer check    4. Specialty & Provider Name/Lab/Imaging Location: Dr. Armendariz    5. Is appointment scheduled for requested order/referral: yes -                   2. SPECIFIC Action To Be Taken: Referral pending, please sign.    3. Diagnosis/Clinical Reason for Request: hand pain    4. Specialty & Provider Name/Lab/Imaging Location: Marcial David    5. Is appointment scheduled for requested order/referral: yes

## 2018-03-02 ENCOUNTER — PATIENT OUTREACH (OUTPATIENT)
Dept: HEALTH INFORMATION MANAGEMENT | Facility: OTHER | Age: 63
End: 2018-03-02

## 2018-03-02 NOTE — OP THERAPY DAILY TREATMENT
Outpatient Physical Therapy  DAILY TREATMENT     [facility] Physical Therapy [address].  [address]  [place] [address]  Phone:  [phone]  Fax:  [phone]    Date: 03/01/2018    Patient: Karuna Reid  YOB: 1955  MRN: [id number]     Time Calculation  Start time: 1630  Stop time: 1714 Time Calculation (min): 44 minutes     Chief Complaint: Neck Pain    Visit #: 3    SUBJECTIVE:  Patient reports that she is feeling much better after her last tx session, but still has a not in her left upper trap area    OBJECTIVE:  Current objective measures: decreased R C/S Rotation          Therapeutic Exercises (CPT 09389):     1. Foam Roller, pec, BUE shoulder flexion with dowel, scapular protraction, angels, x10 min    2. [place] at wall on 1/2 foam, x30    3. Standing shoulder extension, palms up, x 10 PTB    Therapeutic Treatments and Modalities:     1. Manual Therapy (CPT 87514), Subocc release, STM UT. Manual C/S Traction Grades I-III 20 sec on 20 sec off, SCS L st1 rib for elevation correction , Improved rotation after manual therapies    Time-based treatments/modalities:  Manual therapy minutes (CPT 12163): 14 minutes  Therapeutic exercise minutes (CPT 07238): 15 minutes           ASSESSMENT:   Response to treatment: Elevated left first rib that responds well to manual therapies. She has some difficult not recruiting UT with all standing postural correction activities, but does well with tactile cueing     PLAN/RECOMMENDATIONS:   Plan for treatment: therapy treatment to continue next visit.  Planned interventions for next visit: continue with current treatment.

## 2018-03-03 NOTE — PROGRESS NOTES
Attempt #:Final    WebIZ Checked & Epic Updated: no  HealthConnect Verified: no   Verify PCP: yes      Pt is already established / No care gaps due.    Care Gap Scheduling (Attempt to Schedule EACH Overdue Care Gap!)  There are no preventive care reminders to display for this patient.    [employer] Activation: already active

## 2018-03-15 ENCOUNTER — PHYSICAL THERAPY (OUTPATIENT)
Dept: PHYSICAL THERAPY | Facility: REHABILITATION | Age: 63
End: 2018-03-15
Attending: INTERNAL MEDICINE
Payer: COMMERCIAL

## 2018-03-15 DIAGNOSIS — M54.2 NECK PAIN: ICD-10-CM

## 2018-03-15 PROCEDURE — 97014 ELECTRIC STIMULATION THERAPY: CPT

## 2018-03-15 PROCEDURE — 97110 THERAPEUTIC EXERCISES: CPT

## 2018-03-15 PROCEDURE — 97140 MANUAL THERAPY 1/> REGIONS: CPT

## 2018-03-20 NOTE — OP THERAPY DAILY TREATMENT
Outpatient Physical Therapy  DAILY TREATMENT     [facility] Physical Therapy [address]  [address]  [place] [address]  Phone:  [phone]  Fax:  [phone]    Date: 03/15/2018    Patient: Karuna Reid  YOB: 1955  MRN: [id number]     Time Calculation  Start time: 1630  Stop time: 1720 Time Calculation (min): 50 minutes     Chief Complaint: Neck Pain    Visit #: 4    SUBJECTIVE:  Pt w/slight ache upper neck, no c/o into UEs    OBJECTIVE:    Therapeutic Exercises (CPT 83858):     1. Foam Roller, pec stretch, alt UE OH and lat, angels, serratus    Therapeutic Treatments and Modalities:     1. Manual Therapy (CPT 67891), Subocc release, CT mobs, 1st rib caudal and ant glides, MFR UT/LS/rhomboids, manual tx, rot PA T4-10 Gr III    2. E Stim Unattended (CPT 32991), IFC and MHP to C/S, x15 min    Time-based treatments/modalities:  Manual therapy minutes (CPT 64458): 20 minutes  Therapeutic exercise minutes (CPT 08381): 10 minutes       ASSESSMENT:   Cont w/mild UT dominance, improved quality of T/S and C/S movement    PLAN/RECOMMENDATIONS:   Plan for treatment: therapy treatment to continue next visit.  Planned interventions for next visit: continue with current treatment. Scap stab w/TB, qped T/S mobility, low trap on roller.

## 2018-03-21 ENCOUNTER — PHYSICAL THERAPY (OUTPATIENT)
Dept: PHYSICAL THERAPY | Facility: REHABILITATION | Age: 63
End: 2018-03-21
Attending: INTERNAL MEDICINE
Payer: COMMERCIAL

## 2018-03-21 DIAGNOSIS — M54.2 NECK PAIN: ICD-10-CM

## 2018-03-21 PROCEDURE — 97110 THERAPEUTIC EXERCISES: CPT

## 2018-03-21 NOTE — OP THERAPY DAILY TREATMENT
Outpatient Physical Therapy  DAILY TREATMENT     [facility] Physical [address].  [address]  [place] [address]  Phone:  [phone]  Fax:  [phone]    Date: 03/21/2018    Patient: Karuna Reid  YOB: 1955  MRN: [id number]     Time Calculation  Start time: 1630  Stop time: 1700 Time Calculation (min): 30 minutes     Chief Complaint: Neck Pain    Visit #: 5    SUBJECTIVE:  Pt reports improvement over last couple of days, no headaches and no tension unless pushing on muscles.    OBJECTIVE:    Therapeutic Exercises (CPT 04186):     1. Foam Roller, pec stretch, alt UE OH and lat, angels, low trap    2. Cat/cow, x10    3. Qped T/S rot, x10 ea    4. Rows w/pink TB, x20    5. Long lats w/pink TB, x20    6. Horiz abd/ER, x20      Time-based treatments/modalities:  Therapeutic exercise minutes (CPT 38019): 30 minutes     ASSESSMENT:   Pt w/generally poor body awareness and limited T/S ext.    PLAN/RECOMMENDATIONS:   Plan for treatment: therapy treatment to continue next visit.  Pt to trial self-management x1 wk then f/u for HEP progressions.

## 2018-03-28 ENCOUNTER — TELEPHONE (OUTPATIENT)
Dept: PHYSICAL THERAPY | Facility: REHABILITATION | Age: 63
End: 2018-03-28

## 2018-03-28 NOTE — OP THERAPY DISCHARGE SUMMARY
Outpatient Physical Therapy  DISCHARGE SUMMARY NOTE      [facility] Physical Therapy [address].  [address]  [place] [address]  Phone:  [phone]  Fax:  [phone]    Date of Visit: 03/28/2018    Patient: Karuna Reid  YOB: 1955  MRN: [id number]     Referring Provider: Lenny lAves M.D.   Referring Diagnosis Neck pain [M54.2]     Physical Therapy Occurrence Codes    Date of onset of impairment:  11/26/17   Date physical therapy care plan established or reviewed:  1/26/18          Your patient is being discharged from Physical Therapy with the following comments:   · Goals met    Comments:  Pt demos normalized ROM and resolution of neck pain.      Recommendations:  D/C to MELINDA Davdi PT, DPT    Date: 3/28/2018

## 2018-04-06 ENCOUNTER — APPOINTMENT (OUTPATIENT)
Dept: PHYSICAL THERAPY | Facility: REHABILITATION | Age: 63
End: 2018-04-06
Attending: INTERNAL MEDICINE
Payer: COMMERCIAL

## 2018-06-20 ENCOUNTER — OFFICE VISIT (OUTPATIENT)
Dept: MEDICAL GROUP | Facility: MEDICAL CENTER | Age: 63
End: 2018-06-20
Payer: COMMERCIAL

## 2018-06-20 VITALS
HEART RATE: 78 BPM | WEIGHT: 131 LBS | OXYGEN SATURATION: 95 % | DIASTOLIC BLOOD PRESSURE: 70 MMHG | RESPIRATION RATE: 16 BRPM | BODY MASS INDEX: 22.36 KG/M2 | SYSTOLIC BLOOD PRESSURE: 104 MMHG | TEMPERATURE: 98.9 F | HEIGHT: 64 IN

## 2018-06-20 DIAGNOSIS — M54.2 NECK PAIN: ICD-10-CM

## 2018-06-20 DIAGNOSIS — M85.80 OSTEOPENIA, UNSPECIFIED LOCATION: ICD-10-CM

## 2018-06-20 DIAGNOSIS — Z12.83 SCREENING FOR SKIN CANCER: ICD-10-CM

## 2018-06-20 PROBLEM — M81.8 OTHER OSTEOPOROSIS WITHOUT CURRENT PATHOLOGICAL FRACTURE: Status: RESOLVED | Noted: 2018-06-20 | Resolved: 2018-06-20

## 2018-06-20 PROBLEM — M81.8 OTHER OSTEOPOROSIS WITHOUT CURRENT PATHOLOGICAL FRACTURE: Status: ACTIVE | Noted: 2018-06-20

## 2018-06-20 PROCEDURE — 99214 OFFICE O/P EST MOD 30 MIN: CPT | Performed by: INTERNAL MEDICINE

## 2018-06-20 RX ORDER — ALENDRONATE SODIUM 70 MG/1
TABLET ORAL
Refills: 3 | COMMUNITY
Start: 2018-04-17 | End: 2018-06-20 | Stop reason: SDUPTHER

## 2018-06-20 RX ORDER — ALENDRONATE SODIUM 70 MG/1
70 TABLET ORAL
Qty: 4 TAB | Refills: 3 | Status: SHIPPED | OUTPATIENT
Start: 2018-06-20 | End: 2019-08-29

## 2018-06-20 RX ORDER — TIZANIDINE 4 MG/1
4 TABLET ORAL 3 TIMES DAILY
Qty: 60 TAB | Refills: 6 | Status: SHIPPED | OUTPATIENT
Start: 2018-06-20 | End: 2019-08-29

## 2018-06-21 NOTE — PROGRESS NOTES
"CC: Follow-up bone density, neck pain.    HPI:   Karuna presents today with the following.    1. Osteopenia, unspecified location  Presents after being seen at spine in [place] and placed on Fosamax.  Her T score did reveal a -2.4 in one area but her overall risk of fracture in the next 10 years is 10% in the hip it was 1.9.  That being said she is tolerating medications adequately.    2. Neck pain  She is having persistent neck pain and stiffness.  She went to physical therapy with some help but did not go persistently.  She denies any headaches no weakness in her extremities.    3. Screening for skin cancer  Followed by dermatology requesting referral.      Patient Active Problem List    Diagnosis Date Noted   • Osteopenia 01/22/2018   • Carpal tunnel syndrome 08/14/2014   • Menopausal and postmenopausal disorder        Current Outpatient Prescriptions   Medication Sig Dispense Refill   • alendronate (FOSAMAX) 70 MG Tab Take 1 Tab by mouth every 7 days. 4 Tab 3   • tizanidine (ZANAFLEX) 4 MG Tab Take 1 Tab by mouth 3 times a day. 60 Tab 6   • Diclofenac Sodium (VOLTAREN) 1 % Gel Apply 2-4 g to skin as directed 4 times a day. 5 Tube 6   • docusate sodium (COLACE) 100 MG Cap Take 100 mg by mouth 2 times a day.     • cyclosporin (RESTASIS) 0.05 % ophthalmic emulsion Place 1 Drop in both eyes 2 times a day.       No current facility-administered medications for this visit.          Allergies as of 06/20/2018 - Reviewed 03/01/2018   Allergen Reaction Noted   • Other drug Rash 09/29/2016        ROS: Denies Chest pain, SOB, LE edema.    /70   Pulse 78   Temp 37.2 °C (98.9 °F)   Resp 16   Ht 1.626 m (5' 4\")   Wt 59.4 kg (131 lb)   SpO2 95%   BMI 22.49 kg/m²     Physical Exam:  Gen:         Alert and oriented, No apparent distress.  Neck:        No Lymphadenopathy or Bruits.  Lungs:     Clear to auscultation bilaterally  CV:          Regular rate and rhythm. No murmurs, rubs or gallops.               Ext:      "     No clubbing, cyanosis, edema.      Assessment and Plan.   62 y.o. female with the following issues.    1. Osteopenia, unspecified location  Since she is already on medication and tolerating will continue repeat bone density in 2 years have given a new prescription.  - alendronate (FOSAMAX) 70 MG Tab; Take 1 Tab by mouth every 7 days.  Dispense: 4 Tab; Refill: 3    2. Neck pain  Paraspinous muscle spasm have given topical anti-inflammatory muscle relaxant caution about side effects.  If not improving consider trigger points.  - tizanidine (ZANAFLEX) 4 MG Tab; Take 1 Tab by mouth 3 times a day.  Dispense: 60 Tab; Refill: 6  - Diclofenac Sodium (VOLTAREN) 1 % Gel; Apply 2-4 g to skin as directed 4 times a day.  Dispense: 5 Tube; Refill: 6    3. Screening for skin cancer  Referred back to dermatology.  - REFERRAL TO DERMATOLOGY

## 2018-09-05 ENCOUNTER — APPOINTMENT (RX ONLY)
Dept: URBAN - METROPOLITAN AREA CLINIC 4 | Facility: CLINIC | Age: 63
Setting detail: DERMATOLOGY
End: 2018-09-05

## 2018-09-05 DIAGNOSIS — L90.5 SCAR CONDITIONS AND FIBROSIS OF SKIN: ICD-10-CM

## 2018-09-05 DIAGNOSIS — L70.8 OTHER ACNE: ICD-10-CM

## 2018-09-05 PROCEDURE — ? ADDITIONAL NOTES

## 2018-09-05 PROCEDURE — ? COSMETIC EXTRACTIONS

## 2018-09-05 PROCEDURE — 99212 OFFICE O/P EST SF 10 MIN: CPT

## 2018-09-05 ASSESSMENT — LOCATION ZONE DERM
LOCATION ZONE: LEG
LOCATION ZONE: TRUNK

## 2018-09-05 ASSESSMENT — LOCATION SIMPLE DESCRIPTION DERM
LOCATION SIMPLE: LEFT THIGH
LOCATION SIMPLE: LEFT UPPER BACK

## 2018-09-05 ASSESSMENT — LOCATION DETAILED DESCRIPTION DERM
LOCATION DETAILED: LEFT ANTERIOR DISTAL THIGH
LOCATION DETAILED: LEFT INFERIOR UPPER BACK

## 2018-09-05 NOTE — PROCEDURE: COSMETIC EXTRACTIONS
Consent: The patient's consent was obtained including but not limited to risks of crusting, scabbing, blistering, scarring, darker or lighter pigmentary change, recurrence, incomplete removal and infection.
Detail Level: Detailed
Render The Number Of Extractions: Yes
Anesthesia Volume In Cc: 0
Post-Care Instructions: I reviewed with the patient in detail post-care instructions. Patient is to wear sunprotection, and avoid picking at any of the treated lesions. Pt may apply Vaseline to crusted or scabbing areas.

## 2018-09-24 ENCOUNTER — PATIENT OUTREACH (OUTPATIENT)
Dept: HEALTH INFORMATION MANAGEMENT | Facility: OTHER | Age: 63
End: 2018-09-24

## 2018-09-26 ENCOUNTER — OFFICE VISIT (OUTPATIENT)
Dept: MEDICAL GROUP | Facility: MEDICAL CENTER | Age: 63
End: 2018-09-26
Payer: COMMERCIAL

## 2018-09-26 VITALS
OXYGEN SATURATION: 96 % | HEART RATE: 74 BPM | WEIGHT: 132 LBS | SYSTOLIC BLOOD PRESSURE: 102 MMHG | TEMPERATURE: 98.6 F | HEIGHT: 64 IN | RESPIRATION RATE: 14 BRPM | DIASTOLIC BLOOD PRESSURE: 70 MMHG | BODY MASS INDEX: 22.53 KG/M2

## 2018-09-26 DIAGNOSIS — G89.29 NECK PAIN, CHRONIC: ICD-10-CM

## 2018-09-26 DIAGNOSIS — Z23 NEED FOR VACCINATION: ICD-10-CM

## 2018-09-26 DIAGNOSIS — M54.2 NECK PAIN, CHRONIC: ICD-10-CM

## 2018-09-26 PROCEDURE — 90686 IIV4 VACC NO PRSV 0.5 ML IM: CPT | Performed by: INTERNAL MEDICINE

## 2018-09-26 PROCEDURE — 99214 OFFICE O/P EST MOD 30 MIN: CPT | Mod: 25 | Performed by: INTERNAL MEDICINE

## 2018-09-26 PROCEDURE — 90471 IMMUNIZATION ADMIN: CPT | Performed by: INTERNAL MEDICINE

## 2018-09-27 NOTE — PROGRESS NOTES
"CC: Follow-up chronic neck pain.    HPI:   Karuna presents today with the following.    1. Neck pain, chronic  Presents after going through physical therapy and trials of anti-inflammatories for neck pain with persistent symptoms.  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  Pain gets to be 6 out of 10 intensity is worse with work.  She denies any radiation down the arms does have occasional headaches associated.  No blurred vision or numbness in her arms.  She reports the topical anti-inflammatories made it worse.    2. Need for vaccination        Patient Active Problem List    Diagnosis Date Noted   • Osteopenia 01/22/2018   • Carpal tunnel syndrome 08/14/2014   • Menopausal and postmenopausal disorder        Current Outpatient Prescriptions   Medication Sig Dispense Refill   • alendronate (FOSAMAX) 70 MG Tab Take 1 Tab by mouth every 7 days. 4 Tab 3   • tizanidine (ZANAFLEX) 4 MG Tab Take 1 Tab by mouth 3 times a day. 60 Tab 6   • docusate sodium (COLACE) 100 MG Cap Take 100 mg by mouth 2 times a day.     • cyclosporin (RESTASIS) 0.05 % ophthalmic emulsion Place 1 Drop in both eyes 2 times a day.       No current facility-administered medications for this visit.          Allergies as of 09/26/2018 - Reviewed 09/26/2018   Allergen Reaction Noted   • Other drug Rash 09/29/2016        ROS: Denies Chest pain, SOB, LE edema.    /70 (BP Location: Right arm, Patient Position: Sitting)   Pulse 74   Temp 37 °C (98.6 °F) (Temporal)   Resp 14   Ht 1.626 m (5' 4\")   Wt 59.9 kg (132 lb)   SpO2 96%   BMI 22.66 kg/m²     Physical Exam:  Gen:         Alert and oriented, No apparent distress.  Neck:        No Lymphadenopathy or Bruits.  Lungs:     Clear to auscultation bilaterally  CV:          Regular rate and rhythm. No murmurs, rubs or gallops.               Ext:          No clubbing, cyanosis, edema.      Assessment and Plan.   62 y.o. female with the " following issues.    1. Neck pain, chronic  Given that she is failed physical therapy have referred to pain specialist getting MRI prior.  - REFERRAL TO PAIN CLINIC  - MR-CERVICAL SPINE-W/O; Future    2. Need for vaccination    - Influenza Vaccine Quad Injection >3Y (PF)

## 2018-09-29 ENCOUNTER — HOSPITAL ENCOUNTER (OUTPATIENT)
Dept: RADIOLOGY | Facility: MEDICAL CENTER | Age: 63
End: 2018-09-29
Attending: INTERNAL MEDICINE
Payer: COMMERCIAL

## 2018-09-29 DIAGNOSIS — G89.29 NECK PAIN, CHRONIC: ICD-10-CM

## 2018-09-29 DIAGNOSIS — M54.2 NECK PAIN, CHRONIC: ICD-10-CM

## 2018-09-29 PROCEDURE — 72141 MRI NECK SPINE W/O DYE: CPT

## 2018-10-31 ENCOUNTER — TELEPHONE (OUTPATIENT)
Dept: MEDICAL GROUP | Facility: MEDICAL CENTER | Age: 63
End: 2018-10-31

## 2018-10-31 DIAGNOSIS — M85.80 OSTEOPENIA, UNSPECIFIED LOCATION: ICD-10-CM

## 2018-10-31 NOTE — TELEPHONE ENCOUNTER
1. Caller Name: Karuna Nicole Mardon            Call Back Number: [phone] (home) [phone] (work)        Patient approves a detailed voicemail message: yes    2. SPECIFIC Action To Be Taken: Referral pending, please sign.    3. Diagnosis/Clinical Reason for Request: Osteopenia    4. Specialty & Provider Name/Lab/Imaging Location: Dr. Caitie Rivera    5. Is appointment scheduled for requested order/referral: yes     Patient was informed they will receive a return phone call from the office ONLY if there are any questions before processing their request. Advised to call back if they haven't received a call from the referral department in 5 days.

## 2019-02-23 ENCOUNTER — HOSPITAL ENCOUNTER (OUTPATIENT)
Dept: RADIOLOGY | Facility: MEDICAL CENTER | Age: 64
End: 2019-02-23
Attending: OBSTETRICS & GYNECOLOGY
Payer: COMMERCIAL

## 2019-02-23 DIAGNOSIS — Z12.39 SCREENING BREAST EXAMINATION: ICD-10-CM

## 2019-02-23 PROCEDURE — 77063 BREAST TOMOSYNTHESIS BI: CPT

## 2019-07-24 ENCOUNTER — OFFICE VISIT (OUTPATIENT)
Dept: MEDICAL GROUP | Facility: MEDICAL CENTER | Age: 64
End: 2019-07-24
Payer: COMMERCIAL

## 2019-07-24 VITALS
TEMPERATURE: 98.7 F | HEART RATE: 80 BPM | BODY MASS INDEX: 22.36 KG/M2 | OXYGEN SATURATION: 95 % | SYSTOLIC BLOOD PRESSURE: 122 MMHG | DIASTOLIC BLOOD PRESSURE: 64 MMHG | HEIGHT: 64 IN | WEIGHT: 131 LBS

## 2019-07-24 DIAGNOSIS — R10.33 PERIUMBILICAL ABDOMINAL PAIN: ICD-10-CM

## 2019-07-24 DIAGNOSIS — R53.83 FATIGUE, UNSPECIFIED TYPE: ICD-10-CM

## 2019-07-24 DIAGNOSIS — Z11.59 NEED FOR HEPATITIS C SCREENING TEST: ICD-10-CM

## 2019-07-24 DIAGNOSIS — M81.0 OSTEOPOROSIS, UNSPECIFIED OSTEOPOROSIS TYPE, UNSPECIFIED PATHOLOGICAL FRACTURE PRESENCE: ICD-10-CM

## 2019-07-24 DIAGNOSIS — Z13.220 SCREENING, LIPID: ICD-10-CM

## 2019-07-24 PROBLEM — M85.80 OSTEOPENIA: Status: RESOLVED | Noted: 2018-01-22 | Resolved: 2019-07-24

## 2019-07-24 PROCEDURE — 99214 OFFICE O/P EST MOD 30 MIN: CPT | Performed by: INTERNAL MEDICINE

## 2019-07-24 ASSESSMENT — PATIENT HEALTH QUESTIONNAIRE - PHQ9: CLINICAL INTERPRETATION OF PHQ2 SCORE: 0

## 2019-07-25 NOTE — PROGRESS NOTES
"      CC: Nominal pain, osteoporosis, fatigue.                                                                                                                                      HPI:   Karuna presents today with the following.    1. Periumbilical abdominal pain  Presents complaining of intermittent pain mostly around her bellybutton.  She does not report any specific bulging she does have a previous surgery laparoscopic with an incision for hysterectomy.  Pain can be 3 out of 10 in intensity not currently experiencing.  Been present for several months no radiation no change to bowel or bladder.    2. Osteoporosis, unspecified osteoporosis type, unspecified pathological fracture presence  Does have osteoporosis maintain on Fosamax current with bone density    3. Fatigue, unspecified type  Complaining increased fatigue is questioning whether she is B12 deficient or not.  Denies any blood or sore dark tarry stool.      Patient Active Problem List    Diagnosis Date Noted   • Osteoporosis 06/20/2018   • Carpal tunnel syndrome 08/14/2014   • Menopausal and postmenopausal disorder        Current Outpatient Prescriptions   Medication Sig Dispense Refill   • alendronate (FOSAMAX) 70 MG Tab Take 1 Tab by mouth every 7 days. (Patient not taking: Reported on 7/24/2019) 4 Tab 3   • tizanidine (ZANAFLEX) 4 MG Tab Take 1 Tab by mouth 3 times a day. (Patient not taking: Reported on 7/24/2019) 60 Tab 6   • docusate sodium (COLACE) 100 MG Cap Take 100 mg by mouth 2 times a day.     • cyclosporin (RESTASIS) 0.05 % ophthalmic emulsion Place 1 Drop in both eyes 2 times a day.       No current facility-administered medications for this visit.          Allergies as of 07/24/2019 - Reviewed 07/24/2019   Allergen Reaction Noted   • Other drug Rash 09/29/2016        ROS: Denies Chest pain, SOB, LE edema.    /64 (BP Location: Right arm, Patient Position: Sitting)   Pulse 80   Temp 37.1 °C (98.7 °F)   Ht 1.626 m (5' 4\")   Wt 59.4 " kg (131 lb)   SpO2 95%   BMI 22.49 kg/m²     Physical Exam:  Gen:         Alert and oriented, No apparent distress.  Neck:        No Lymphadenopathy or Bruits.  Lungs:     Clear to auscultation bilaterally  CV:          Regular rate and rhythm. No murmurs, rubs or gallops.           ABD:      Soft non tender, non distended. Normal active bowel sounds.  No  Hepatosplenomegaly, No pulsatile masses.    Appears to be a very small reducible umbilical hernia      Ext:          No clubbing, cyanosis, edema.      Assessment and Plan.   63 y.o. female with the following issues.    1. Periumbilical abdominal pain  Have written for ultrasound of hernia we will discuss whether or not she wants to be referred to a surgeon  - US-HERNIA ABDOMEN; Future    2. Osteoporosis, unspecified osteoporosis type, unspecified pathological fracture presence  Repeat bone density next year    3. Fatigue, unspecified type  Checking blood work.  - CBC WITH DIFFERENTIAL; Future    4. Screening, lipid    - Comp Metabolic Panel; Future  - Lipid Profile; Future    5. Need for hepatitis C screening test    - HEP C VIRUS ANTIBODY; Future

## 2019-08-29 ENCOUNTER — OFFICE VISIT (OUTPATIENT)
Dept: URGENT CARE | Facility: CLINIC | Age: 64
End: 2019-08-29
Payer: COMMERCIAL

## 2019-08-29 VITALS
HEART RATE: 68 BPM | RESPIRATION RATE: 16 BRPM | DIASTOLIC BLOOD PRESSURE: 80 MMHG | WEIGHT: 128.6 LBS | HEIGHT: 64 IN | SYSTOLIC BLOOD PRESSURE: 122 MMHG | BODY MASS INDEX: 21.95 KG/M2 | TEMPERATURE: 99 F | OXYGEN SATURATION: 98 %

## 2019-08-29 DIAGNOSIS — B07.0 PLANTAR WART OF LEFT FOOT: ICD-10-CM

## 2019-08-29 PROCEDURE — 99213 OFFICE O/P EST LOW 20 MIN: CPT | Performed by: PHYSICIAN ASSISTANT

## 2019-09-01 ASSESSMENT — ENCOUNTER SYMPTOMS
NEUROLOGICAL NEGATIVE: 1
CONSTITUTIONAL NEGATIVE: 1
MUSCULOSKELETAL NEGATIVE: 1
ROS SKIN COMMENTS: SEE HPI
GASTROINTESTINAL NEGATIVE: 1

## 2019-09-01 NOTE — PROGRESS NOTES
"Subjective:      Karuna Reid is a 63 y.o. female who presents with Foreign Body (x 2 wks, step on something with Lt. foot, dark bump under foot)        Foreign Body     Patient presents for \"at least two weeks\" of possible FB in the sole of her left foot.  She notes she first noticed it when she was walking in grass. She felt a pain in her foot but did not think too much of it.  She looked at it and was smaller and dark and thought she got something in her foot and has been watching it for the last few weeks but has not been getting better.   No numbness, tingling or drainage.  Notes tender to walk on .     Review of Systems   Constitutional: Negative.    Gastrointestinal: Negative.    Musculoskeletal: Negative.    Skin:        SEE HPI   Neurological: Negative.    Endo/Heme/Allergies: Negative.        PMH:  has a past medical history of Menopausal and postmenopausal disorder and Urinary bladder disorder.  MEDS:   Current Outpatient Medications:   •  docusate sodium (COLACE) 100 MG Cap, Take 100 mg by mouth 2 times a day., Disp: , Rfl:   •  cyclosporin (RESTASIS) 0.05 % ophthalmic emulsion, Place 1 Drop in both eyes 2 times a day., Disp: , Rfl:   ALLERGIES:   Allergies   Allergen Reactions   • Other Drug Rash     Steroids, caused redness and rash     SURGHX:   Past Surgical History:   Procedure Laterality Date   • HYSTERECTOMY ROBOTIC  10/19/2016    Procedure: HYSTERECTOMY ROBOTIC Total with BILATERAL SALPINGO-OOPHORECTOMY;  Surgeon: Lenny Cazares M.D.;  Location: [facility];  Service:    • ANTERIOR AND POSTERIOR REPAIR  10/19/2016    Procedure: POSTERIOR REPAIR;  Surgeon: Lenny Cazares M.D.;  Location: [facility];  Service:    • VAGINAL SUSPENSION  10/19/2016    Procedure: VAGINAL SUSPENSION Uterosacral, and  McCalls Culdoplasty;  Surgeon: Lenny Cazares M.D.;  Location: [facility];  Service:    • CYSTOSCOPY  10/19/2016    Procedure: CYSTOSCOPY;  Surgeon: Lenny ATKINSON" "CARLTON Cazares;  Location: SURGERY [facility];  Service:    • TONSILLECTOMY  4/18/2014    Performed by Nirali Dos Santos M.D. at SURGERY [facility]   • OTHER SURGICAL PROCEDURE  1989    Dr Doss; Neck superficial exploration   • TONSILLECTOMY  1959     SOCHX:  reports that she quit smoking about 26 years ago. Her smoking use included cigarettes. She started smoking about 49 years ago. She has a 23.00 pack-year smoking history. She has never used smokeless tobacco. She reports that she drinks about 0.6 oz of alcohol per week. She reports that she does not use drugs.  FH: Family history was reviewed, no pertinent findings to report   Objective:     /80 (BP Location: Left arm, Patient Position: Sitting, BP Cuff Size: Adult)   Pulse 68   Temp 37.2 °C (99 °F) (Temporal)   Resp 16   Ht 1.626 m (5' 4\")   Wt 58.3 kg (128 lb 9.6 oz)   SpO2 98%   BMI 22.07 kg/m²      Physical Exam   Constitutional: She is oriented to person, place, and time. She appears well-developed and well-nourished. No distress.   HENT:   Head: Normocephalic and atraumatic.   Eyes: Conjunctivae and EOM are normal.   Neck: Normal range of motion. Neck supple.   Cardiovascular: Normal rate, regular rhythm and normal heart sounds.   Pulmonary/Chest: Effort normal and breath sounds normal. No respiratory distress.   Musculoskeletal:        Feet:    Neurological: She is alert and oriented to person, place, and time.   Skin: Skin is warm and dry.   Psychiatric: She has a normal mood and affect. Her behavior is normal.   Vitals reviewed.              Assessment/Plan:     1. Plantar wart of left foot         -patient notes she was sure that she had FB or stepped on something.  Area was cleansed and numbness and splinter foreceps used to probe nodule.  No FB was identified and nodule was solid consistent plantar wart  -discussed treatment for this in detail  -trial of OTC and follow up with PCP/Derm prn      Supportive care, differential " diagnoses, and indications for immediate follow-up discussed with patient.   Pathogenesis of diagnosis discussed including typical length and natural progression.   Instructed to return to clinic or nearest emergency department for any change in condition, further concerns, or worsening of symptoms.  Patient states understanding of the plan of care and discharge instructions.        Perla Chaudhari P.A.-C.

## 2019-09-14 ENCOUNTER — HOSPITAL ENCOUNTER (OUTPATIENT)
Dept: RADIOLOGY | Facility: MEDICAL CENTER | Age: 64
End: 2019-09-14
Attending: INTERNAL MEDICINE
Payer: COMMERCIAL

## 2019-09-14 ENCOUNTER — HOSPITAL ENCOUNTER (OUTPATIENT)
Dept: LAB | Facility: MEDICAL CENTER | Age: 64
End: 2019-09-14
Attending: INTERNAL MEDICINE
Payer: COMMERCIAL

## 2019-09-14 DIAGNOSIS — R10.33 PERIUMBILICAL ABDOMINAL PAIN: ICD-10-CM

## 2019-09-14 DIAGNOSIS — R53.83 FATIGUE, UNSPECIFIED TYPE: ICD-10-CM

## 2019-09-14 DIAGNOSIS — Z11.59 NEED FOR HEPATITIS C SCREENING TEST: ICD-10-CM

## 2019-09-14 DIAGNOSIS — Z13.220 SCREENING, LIPID: ICD-10-CM

## 2019-09-14 LAB
ALBUMIN SERPL BCP-MCNC: 4.4 G/DL (ref 3.2–4.9)
ALBUMIN/GLOB SERPL: 1.6 G/DL
ALP SERPL-CCNC: 53 U/L (ref 30–99)
ALT SERPL-CCNC: 21 U/L (ref 2–50)
ANION GAP SERPL CALC-SCNC: 6 MMOL/L (ref 0–11.9)
AST SERPL-CCNC: 18 U/L (ref 12–45)
BASOPHILS # BLD AUTO: 0.6 % (ref 0–1.8)
BASOPHILS # BLD: 0.04 K/UL (ref 0–0.12)
BILIRUB SERPL-MCNC: 0.5 MG/DL (ref 0.1–1.5)
BUN SERPL-MCNC: 16 MG/DL (ref 8–22)
CALCIUM SERPL-MCNC: 9.3 MG/DL (ref 8.5–10.5)
CHLORIDE SERPL-SCNC: 108 MMOL/L (ref 96–112)
CHOLEST SERPL-MCNC: 195 MG/DL (ref 100–199)
CO2 SERPL-SCNC: 28 MMOL/L (ref 20–33)
CREAT SERPL-MCNC: 0.75 MG/DL (ref 0.5–1.4)
EOSINOPHIL # BLD AUTO: 0.15 K/UL (ref 0–0.51)
EOSINOPHIL NFR BLD: 2.4 % (ref 0–6.9)
ERYTHROCYTE [DISTWIDTH] IN BLOOD BY AUTOMATED COUNT: 44.2 FL (ref 35.9–50)
FASTING STATUS PATIENT QL REPORTED: NORMAL
GLOBULIN SER CALC-MCNC: 2.8 G/DL (ref 1.9–3.5)
GLUCOSE SERPL-MCNC: 96 MG/DL (ref 65–99)
HCT VFR BLD AUTO: 45.3 % (ref 37–47)
HCV AB SER QL: NEGATIVE
HDLC SERPL-MCNC: 75 MG/DL
HGB BLD-MCNC: 14.6 G/DL (ref 12–16)
IMM GRANULOCYTES # BLD AUTO: 0.01 K/UL (ref 0–0.11)
IMM GRANULOCYTES NFR BLD AUTO: 0.2 % (ref 0–0.9)
LDLC SERPL CALC-MCNC: 108 MG/DL
LYMPHOCYTES # BLD AUTO: 2.29 K/UL (ref 1–4.8)
LYMPHOCYTES NFR BLD: 36.5 % (ref 22–41)
MCH RBC QN AUTO: 30.5 PG (ref 27–33)
MCHC RBC AUTO-ENTMCNC: 32.2 G/DL (ref 33.6–35)
MCV RBC AUTO: 94.6 FL (ref 81.4–97.8)
MONOCYTES # BLD AUTO: 0.58 K/UL (ref 0–0.85)
MONOCYTES NFR BLD AUTO: 9.2 % (ref 0–13.4)
NEUTROPHILS # BLD AUTO: 3.21 K/UL (ref 2–7.15)
NEUTROPHILS NFR BLD: 51.1 % (ref 44–72)
NRBC # BLD AUTO: 0 K/UL
NRBC BLD-RTO: 0 /100 WBC
PLATELET # BLD AUTO: 168 K/UL (ref 164–446)
PMV BLD AUTO: 11.4 FL (ref 9–12.9)
POTASSIUM SERPL-SCNC: 3.8 MMOL/L (ref 3.6–5.5)
PROT SERPL-MCNC: 7.2 G/DL (ref 6–8.2)
RBC # BLD AUTO: 4.79 M/UL (ref 4.2–5.4)
SODIUM SERPL-SCNC: 142 MMOL/L (ref 135–145)
TRIGL SERPL-MCNC: 61 MG/DL (ref 0–149)
WBC # BLD AUTO: 6.3 K/UL (ref 4.8–10.8)

## 2019-09-14 PROCEDURE — 80061 LIPID PANEL: CPT

## 2019-09-14 PROCEDURE — 76705 ECHO EXAM OF ABDOMEN: CPT

## 2019-09-14 PROCEDURE — 86803 HEPATITIS C AB TEST: CPT

## 2019-09-14 PROCEDURE — 80053 COMPREHEN METABOLIC PANEL: CPT

## 2019-09-14 PROCEDURE — 36415 COLL VENOUS BLD VENIPUNCTURE: CPT

## 2019-09-14 PROCEDURE — 85025 COMPLETE CBC W/AUTO DIFF WBC: CPT

## 2019-10-05 ENCOUNTER — IMMUNIZATION (OUTPATIENT)
Dept: SOCIAL WORK | Facility: CLINIC | Age: 64
End: 2019-10-05
Payer: COMMERCIAL

## 2019-10-05 DIAGNOSIS — Z23 NEED FOR VACCINATION: ICD-10-CM

## 2019-10-05 PROCEDURE — 90686 IIV4 VACC NO PRSV 0.5 ML IM: CPT | Performed by: REGISTERED NURSE

## 2019-10-05 PROCEDURE — 90471 IMMUNIZATION ADMIN: CPT | Performed by: REGISTERED NURSE

## 2019-10-31 ENCOUNTER — TELEPHONE (OUTPATIENT)
Dept: MEDICAL GROUP | Facility: MEDICAL CENTER | Age: 64
End: 2019-10-31

## 2019-10-31 DIAGNOSIS — B07.0 PLANTAR WART: ICD-10-CM

## 2019-10-31 NOTE — TELEPHONE ENCOUNTER
1. Caller Name: Karuna                                         Call Back Number: [phone]      Patient approves a detailed voicemail message: N\A    2. SPECIFIC Action To Be Taken: Referral pending, please sign.    3. Diagnosis/Clinical Reason for Request: Plantar Wart on foot    4. Specialty & Provider Name/Lab/Imaging Location: Dr. Lenny Martinez    5. Is appointment scheduled for requested order/referral: no    Patient was informed they will receive a return phone call from the office ONLY if there are any questions before processing their request. Advised to call back if they haven't received a call from the referral department in 5 days.

## 2020-02-29 ENCOUNTER — HOSPITAL ENCOUNTER (OUTPATIENT)
Dept: RADIOLOGY | Facility: MEDICAL CENTER | Age: 65
End: 2020-02-29
Attending: OBSTETRICS & GYNECOLOGY
Payer: COMMERCIAL

## 2020-02-29 DIAGNOSIS — Z12.31 VISIT FOR SCREENING MAMMOGRAM: ICD-10-CM

## 2020-02-29 PROCEDURE — 77067 SCR MAMMO BI INCL CAD: CPT

## 2020-05-18 ENCOUNTER — OFFICE VISIT (OUTPATIENT)
Dept: MEDICAL GROUP | Facility: MEDICAL CENTER | Age: 65
End: 2020-05-18
Payer: MEDICARE

## 2020-05-18 DIAGNOSIS — R10.9 ABDOMINAL PAIN, UNSPECIFIED ABDOMINAL LOCATION: ICD-10-CM

## 2020-05-18 DIAGNOSIS — Z13.6 SCREENING FOR CARDIOVASCULAR CONDITION: ICD-10-CM

## 2020-05-18 PROCEDURE — 99442 PR PHYSICIAN TELEPHONE EVALUATION 11-20 MIN: CPT | Mod: 95 | Performed by: INTERNAL MEDICINE

## 2020-05-18 NOTE — PROGRESS NOTES
As a means of avoiding spread of COVID-19, this visit is being conducted by telephone. This telephone visit was initiated by the patient and they verbally consented.    Time at start of call: 11:33    Reason for Call: Pain.    1. Abdominal pain, unspecified abdominal location  Patient was seen a year ago for periumbilical abdominal pains that were somewhat migratory in nature.  She reports she was pushing on her belly today and noticed a slight twinge.  She reports she has a slight pain lasting for seconds once every few months.  An ultrasound last year was normal.  No nausea or vomiting no bowel complaints no other symptoms.    2. Screening for cardiovascular condition         Assessment and plan.     1. Abdominal pain, unspecified abdominal location  Same complaints as last year do not seem to be worsening in nature no further work-up unless symptoms worsen.    2. Screening for cardiovascular condition    - Comp Metabolic Panel; Future  - Lipid Profile; Future             Time at end of call: 11:45  Total Time Spent: 11-20 minutes    Lenny Alves M.D.

## 2020-06-19 ENCOUNTER — OFFICE VISIT (OUTPATIENT)
Dept: MEDICAL GROUP | Facility: MEDICAL CENTER | Age: 65
End: 2020-06-19
Payer: MEDICARE

## 2020-06-19 VITALS
WEIGHT: 139 LBS | SYSTOLIC BLOOD PRESSURE: 106 MMHG | HEIGHT: 64 IN | DIASTOLIC BLOOD PRESSURE: 64 MMHG | HEART RATE: 80 BPM | OXYGEN SATURATION: 94 % | BODY MASS INDEX: 23.73 KG/M2 | TEMPERATURE: 97.1 F

## 2020-06-19 DIAGNOSIS — R14.0 ABDOMINAL DISTENSION: ICD-10-CM

## 2020-06-19 DIAGNOSIS — Z23 NEED FOR VACCINATION: ICD-10-CM

## 2020-06-19 PROCEDURE — 99214 OFFICE O/P EST MOD 30 MIN: CPT | Mod: 25 | Performed by: INTERNAL MEDICINE

## 2020-06-19 PROCEDURE — 90670 PCV13 VACCINE IM: CPT | Performed by: INTERNAL MEDICINE

## 2020-06-19 PROCEDURE — G0009 ADMIN PNEUMOCOCCAL VACCINE: HCPCS | Performed by: INTERNAL MEDICINE

## 2020-06-19 ASSESSMENT — FIBROSIS 4 INDEX: FIB4 SCORE: 1.52

## 2020-06-19 NOTE — PROGRESS NOTES
"      CC: Abdominal distention                                                                                                                                      HPI:   Karuna presents today with the following.    1. Abdominal distension  Presents reporting multiple transitory pain some in her bellybutton somewhat to the left and right all lasting seconds not severe in nature.  She is continually worried about surgery that she had 2016 feeling.  No change to bowel or bladder no nausea or vomiting.    2. Need for vaccination        Patient Active Problem List    Diagnosis Date Noted   • Osteoporosis 06/20/2018   • Carpal tunnel syndrome 08/14/2014   • Menopausal and postmenopausal disorder        Current Outpatient Medications   Medication Sig Dispense Refill   • docusate sodium (COLACE) 100 MG Cap Take 100 mg by mouth 2 times a day.     • cyclosporin (RESTASIS) 0.05 % ophthalmic emulsion Place 1 Drop in both eyes 2 times a day.       No current facility-administered medications for this visit.          Allergies as of 06/19/2020 - Reviewed 06/19/2020   Allergen Reaction Noted   • Other drug Rash 09/29/2016        ROS: Denies Chest pain, SOB, LE edema.    /64 (BP Location: Right arm, Patient Position: Sitting)   Pulse 80   Temp 36.2 °C (97.1 °F)   Ht 1.626 m (5' 4\")   Wt 63 kg (139 lb)   SpO2 94%   BMI 23.86 kg/m²     Physical Exam:  Gen:         Alert and oriented, No apparent distress.  Neck:        No Lymphadenopathy or Bruits.  Lungs:     Clear to auscultation bilaterally  CV:          Regular rate and rhythm. No murmurs, rubs or gallops.          ABD:      Soft non tender, mild distention. Normal active bowel sounds.  No  Hepatosplenomegaly, No pulsatile masses.           Ext:          No clubbing, cyanosis, edema.      Assessment and Plan.   64 y.o. female with the following issues.    1. Abdominal distension  Descriptors of pain were somewhat unremarkable have written for ultrasound of the " abdomen follow-up abnormalities.  - US-ABDOMEN COMPLETE SURVEY; Future    2. Need for vaccination    - Pneumococcal Conjugate Vaccine 13-Valent

## 2020-06-29 ENCOUNTER — APPOINTMENT (OUTPATIENT)
Dept: RADIOLOGY | Facility: MEDICAL CENTER | Age: 65
End: 2020-06-29
Attending: INTERNAL MEDICINE
Payer: MEDICARE

## 2020-06-30 ENCOUNTER — HOSPITAL ENCOUNTER (OUTPATIENT)
Dept: RADIOLOGY | Facility: MEDICAL CENTER | Age: 65
End: 2020-06-30
Attending: INTERNAL MEDICINE
Payer: MEDICARE

## 2020-06-30 DIAGNOSIS — R14.0 ABDOMINAL DISTENSION: ICD-10-CM

## 2020-06-30 PROCEDURE — 76700 US EXAM ABDOM COMPLETE: CPT

## 2020-07-01 ENCOUNTER — TELEPHONE (OUTPATIENT)
Dept: MEDICAL GROUP | Facility: MEDICAL CENTER | Age: 65
End: 2020-07-01

## 2020-07-01 DIAGNOSIS — K76.9 LIVER DISEASE: ICD-10-CM

## 2020-07-01 DIAGNOSIS — K76.89 HEPATIC CYST: ICD-10-CM

## 2020-07-14 ENCOUNTER — NON-PROVIDER VISIT (OUTPATIENT)
Dept: MEDICAL GROUP | Facility: MEDICAL CENTER | Age: 65
End: 2020-07-14
Payer: MEDICARE

## 2020-07-14 ENCOUNTER — OFFICE VISIT (OUTPATIENT)
Dept: ADMISSIONS | Facility: MEDICAL CENTER | Age: 65
End: 2020-07-14
Attending: PODIATRIST
Payer: MEDICARE

## 2020-07-14 DIAGNOSIS — Z01.812 PRE-OPERATIVE LABORATORY EXAMINATION: ICD-10-CM

## 2020-07-14 DIAGNOSIS — Z23 NEED FOR VACCINATION: ICD-10-CM

## 2020-07-14 LAB — COVID ORDER STATUS COVID19: NORMAL

## 2020-07-14 PROCEDURE — 90471 IMMUNIZATION ADMIN: CPT | Performed by: INTERNAL MEDICINE

## 2020-07-14 PROCEDURE — 99999 PR NO CHARGE: CPT | Performed by: INTERNAL MEDICINE

## 2020-07-14 PROCEDURE — U0003 INFECTIOUS AGENT DETECTION BY NUCLEIC ACID (DNA OR RNA); SEVERE ACUTE RESPIRATORY SYNDROME CORONAVIRUS 2 (SARS-COV-2) (CORONAVIRUS DISEASE [COVID-19]), AMPLIFIED PROBE TECHNIQUE, MAKING USE OF HIGH THROUGHPUT TECHNOLOGIES AS DESCRIBED BY CMS-2020-01-R: HCPCS

## 2020-07-14 PROCEDURE — 90715 TDAP VACCINE 7 YRS/> IM: CPT | Performed by: INTERNAL MEDICINE

## 2020-07-14 ASSESSMENT — FIBROSIS 4 INDEX: FIB4 SCORE: 1.52

## 2020-07-14 NOTE — PROGRESS NOTES
"Karuna Reid is a 64 y.o. female here for a non-provider visit for:   TDAP    Reason for immunization: Overdue/Provider Recommended  Immunization records indicate need for vaccine: Yes, confirmed with Epic  Minimum interval has been met for this vaccine: Yes  ABN completed: Yes    Order and dose verified by: NORMAN  VIS Dated  2/24/15 was given to patient: Yes  All IAC Questionnaire questions were answered \"No.\"    Patient tolerated injection and no adverse effects were observed or reported: Yes    Pt scheduled for next dose in series: Not Indicated    "

## 2020-07-15 LAB
SARS-COV-2 RNA RESP QL NAA+PROBE: NOTDETECTED
SPECIMEN SOURCE: NORMAL

## 2020-07-16 NOTE — OR NURSING
COVID-19 Pre-surgery screenin. Do you have an undiagnosed respiratory illness or symptoms such as coughing or sneezing?  No       2. Do you have an unexplained fever greater than 100.4 degrees Fahrenheit or 38 degrees Celsius?      No    3. Have you had direct exposure to a patient who tested positive for Covid-19?     No    4. Have you traveled outside [facility] in the last 14 days?  NO    5. Have you had any loss of your sense of taste or smell? Have you had N/V or sore throat? NO    Patient has been informed of visitor policy and asked to wear a mask upon entering the hospital   Yes

## 2020-07-17 ENCOUNTER — ANESTHESIA EVENT (OUTPATIENT)
Dept: SURGERY | Facility: MEDICAL CENTER | Age: 65
End: 2020-07-17
Payer: MEDICARE

## 2020-07-17 ENCOUNTER — HOSPITAL ENCOUNTER (OUTPATIENT)
Facility: MEDICAL CENTER | Age: 65
End: 2020-07-17
Attending: PODIATRIST | Admitting: PODIATRIST
Payer: MEDICARE

## 2020-07-17 ENCOUNTER — ANESTHESIA (OUTPATIENT)
Dept: SURGERY | Facility: MEDICAL CENTER | Age: 65
End: 2020-07-17
Payer: MEDICARE

## 2020-07-17 VITALS
OXYGEN SATURATION: 95 % | BODY MASS INDEX: 25.32 KG/M2 | RESPIRATION RATE: 18 BRPM | HEIGHT: 62 IN | TEMPERATURE: 97.2 F | DIASTOLIC BLOOD PRESSURE: 53 MMHG | HEART RATE: 70 BPM | SYSTOLIC BLOOD PRESSURE: 101 MMHG | WEIGHT: 137.57 LBS

## 2020-07-17 LAB — PATHOLOGY CONSULT NOTE: NORMAL

## 2020-07-17 PROCEDURE — 160036 HCHG PACU - EA ADDL 30 MINS PHASE I: Performed by: PODIATRIST

## 2020-07-17 PROCEDURE — 160039 HCHG SURGERY MINUTES - EA ADDL 1 MIN LEVEL 3: Performed by: PODIATRIST

## 2020-07-17 PROCEDURE — 88305 TISSUE EXAM BY PATHOLOGIST: CPT

## 2020-07-17 PROCEDURE — 700105 HCHG RX REV CODE 258: Performed by: PODIATRIST

## 2020-07-17 PROCEDURE — 160009 HCHG ANES TIME/MIN: Performed by: PODIATRIST

## 2020-07-17 PROCEDURE — 700111 HCHG RX REV CODE 636 W/ 250 OVERRIDE (IP)

## 2020-07-17 PROCEDURE — 160048 HCHG OR STATISTICAL LEVEL 1-5: Performed by: PODIATRIST

## 2020-07-17 PROCEDURE — A6454 SELF-ADHER BAND W>=3" <5"/YD: HCPCS | Performed by: PODIATRIST

## 2020-07-17 PROCEDURE — 160002 HCHG RECOVERY MINUTES (STAT): Performed by: PODIATRIST

## 2020-07-17 PROCEDURE — 700101 HCHG RX REV CODE 250: Performed by: PODIATRIST

## 2020-07-17 PROCEDURE — 501838 HCHG SUTURE GENERAL: Performed by: PODIATRIST

## 2020-07-17 PROCEDURE — A9270 NON-COVERED ITEM OR SERVICE: HCPCS | Performed by: PODIATRIST

## 2020-07-17 PROCEDURE — 160046 HCHG PACU - 1ST 60 MINS PHASE II: Performed by: PODIATRIST

## 2020-07-17 PROCEDURE — 160028 HCHG SURGERY MINUTES - 1ST 30 MINS LEVEL 3: Performed by: PODIATRIST

## 2020-07-17 PROCEDURE — 160035 HCHG PACU - 1ST 60 MINS PHASE I: Performed by: PODIATRIST

## 2020-07-17 PROCEDURE — 160025 RECOVERY II MINUTES (STATS): Performed by: PODIATRIST

## 2020-07-17 PROCEDURE — 500881 HCHG PACK, EXTREMITY: Performed by: PODIATRIST

## 2020-07-17 PROCEDURE — 700101 HCHG RX REV CODE 250

## 2020-07-17 PROCEDURE — 700102 HCHG RX REV CODE 250 W/ 637 OVERRIDE(OP): Performed by: PODIATRIST

## 2020-07-17 RX ORDER — ONDANSETRON 2 MG/ML
INJECTION INTRAMUSCULAR; INTRAVENOUS PRN
Status: DISCONTINUED | OUTPATIENT
Start: 2020-07-17 | End: 2020-07-17 | Stop reason: SURG

## 2020-07-17 RX ORDER — HALOPERIDOL 5 MG/ML
1 INJECTION INTRAMUSCULAR
Status: DISCONTINUED | OUTPATIENT
Start: 2020-07-17 | End: 2020-07-17 | Stop reason: HOSPADM

## 2020-07-17 RX ORDER — OXYCODONE HYDROCHLORIDE AND ACETAMINOPHEN 5; 325 MG/1; MG/1
1 TABLET ORAL
Status: DISCONTINUED | OUTPATIENT
Start: 2020-07-17 | End: 2020-07-17 | Stop reason: HOSPADM

## 2020-07-17 RX ORDER — BUPIVACAINE HYDROCHLORIDE 5 MG/ML
INJECTION, SOLUTION EPIDURAL; INTRACAUDAL
Status: DISCONTINUED | OUTPATIENT
Start: 2020-07-17 | End: 2020-07-17 | Stop reason: HOSPADM

## 2020-07-17 RX ORDER — SODIUM CHLORIDE, SODIUM LACTATE, POTASSIUM CHLORIDE, CALCIUM CHLORIDE 600; 310; 30; 20 MG/100ML; MG/100ML; MG/100ML; MG/100ML
INJECTION, SOLUTION INTRAVENOUS CONTINUOUS
Status: DISCONTINUED | OUTPATIENT
Start: 2020-07-17 | End: 2020-07-17 | Stop reason: HOSPADM

## 2020-07-17 RX ORDER — LIDOCAINE HYDROCHLORIDE AND EPINEPHRINE 10; 10 MG/ML; UG/ML
INJECTION, SOLUTION INFILTRATION; PERINEURAL
Status: DISCONTINUED | OUTPATIENT
Start: 2020-07-17 | End: 2020-07-17 | Stop reason: HOSPADM

## 2020-07-17 RX ORDER — OXYCODONE HYDROCHLORIDE AND ACETAMINOPHEN 5; 325 MG/1; MG/1
2 TABLET ORAL
Status: DISCONTINUED | OUTPATIENT
Start: 2020-07-17 | End: 2020-07-17 | Stop reason: HOSPADM

## 2020-07-17 RX ORDER — CEFAZOLIN SODIUM 1 G/3ML
INJECTION, POWDER, FOR SOLUTION INTRAMUSCULAR; INTRAVENOUS PRN
Status: DISCONTINUED | OUTPATIENT
Start: 2020-07-17 | End: 2020-07-17 | Stop reason: SURG

## 2020-07-17 RX ORDER — DEXAMETHASONE SODIUM PHOSPHATE 4 MG/ML
INJECTION, SOLUTION INTRA-ARTICULAR; INTRALESIONAL; INTRAMUSCULAR; INTRAVENOUS; SOFT TISSUE PRN
Status: DISCONTINUED | OUTPATIENT
Start: 2020-07-17 | End: 2020-07-17 | Stop reason: SURG

## 2020-07-17 RX ORDER — DIPHENHYDRAMINE HYDROCHLORIDE 50 MG/ML
12.5 INJECTION INTRAMUSCULAR; INTRAVENOUS
Status: DISCONTINUED | OUTPATIENT
Start: 2020-07-17 | End: 2020-07-17 | Stop reason: HOSPADM

## 2020-07-17 RX ORDER — KETOROLAC TROMETHAMINE 30 MG/ML
INJECTION, SOLUTION INTRAMUSCULAR; INTRAVENOUS PRN
Status: DISCONTINUED | OUTPATIENT
Start: 2020-07-17 | End: 2020-07-17 | Stop reason: SURG

## 2020-07-17 RX ORDER — ONDANSETRON 2 MG/ML
4 INJECTION INTRAMUSCULAR; INTRAVENOUS
Status: DISCONTINUED | OUTPATIENT
Start: 2020-07-17 | End: 2020-07-17 | Stop reason: HOSPADM

## 2020-07-17 RX ADMIN — DEXAMETHASONE SODIUM PHOSPHATE 8 MG: 4 INJECTION, SOLUTION INTRA-ARTICULAR; INTRALESIONAL; INTRAMUSCULAR; INTRAVENOUS; SOFT TISSUE at 10:56

## 2020-07-17 RX ADMIN — CEFAZOLIN 2 G: 330 INJECTION, POWDER, FOR SOLUTION INTRAMUSCULAR; INTRAVENOUS at 10:56

## 2020-07-17 RX ADMIN — FENTANYL CITRATE 100 MCG: 50 INJECTION INTRAMUSCULAR; INTRAVENOUS at 10:56

## 2020-07-17 RX ADMIN — PROPOFOL 200 MG: 10 INJECTION, EMULSION INTRAVENOUS at 10:56

## 2020-07-17 RX ADMIN — SODIUM CHLORIDE, POTASSIUM CHLORIDE, SODIUM LACTATE AND CALCIUM CHLORIDE: 600; 310; 30; 20 INJECTION, SOLUTION INTRAVENOUS at 10:22

## 2020-07-17 RX ADMIN — SODIUM CHLORIDE, POTASSIUM CHLORIDE, SODIUM LACTATE AND CALCIUM CHLORIDE: 600; 310; 30; 20 INJECTION, SOLUTION INTRAVENOUS at 10:56

## 2020-07-17 RX ADMIN — EPHEDRINE SULFATE 10 MG: 50 INJECTION, SOLUTION INTRAVENOUS at 11:02

## 2020-07-17 RX ADMIN — POVIDONE-IODINE 15 ML: 10 SOLUTION TOPICAL at 10:17

## 2020-07-17 RX ADMIN — KETOROLAC TROMETHAMINE 30 MG: 30 INJECTION, SOLUTION INTRAMUSCULAR at 10:56

## 2020-07-17 RX ADMIN — ONDANSETRON 8 MG: 2 INJECTION INTRAMUSCULAR; INTRAVENOUS at 10:56

## 2020-07-17 ASSESSMENT — FIBROSIS 4 INDEX: FIB4 SCORE: 1.52

## 2020-07-17 NOTE — OP REPORT
DATE OF SERVICE:  07/17/2020    PREOPERATIVE DIAGNOSIS:  Soft tissue/vascular tumor, plantar aspect of the   left foot.    POSTOPERATIVE DIAGNOSIS:  Soft tissue tumor, left foot.    SURGEON:  Lenny Silverman DPM    ANESTHESIOLOGIST:  Bertrand Cat MD    ANESTHESIA:  General.    COMPLICATIONS:  None.    ESTIMATED BLOOD LOSS:  Less than 3 mL.    PROCEDURE:  The patient was brought into the operating room, placed on the   operating room table in the supine position.  At which time, the general   anesthesia was administered to patient by Dr. Cat.  Local anesthesia was   infiltrated to the plantar surface of the left foot around the soft tissue   tumor.  Left foot was prepped, scrubbed, and draped in the usual sterile   manner.  Left foot was exsanguinated and ankle tourniquet inflated to 250 mm   of pressure.  Lesion was measured to be 1 cm round.  The elliptical transverse   incision was mapped out using the sterile ink pen.  The ellipse was 3.4 cm   long and 1.4 cm wide to allow 2 mm margin of normal-appearing skin around the   protruding abnormal lesion.  This incision was deepened down to the   subcutaneous tissue.  The ellipse of skin including the soft tissue lesion was   dissected free of soft tissue attachments.    The lesion appeared to protrude down into the subcutaneous tissue, but not   deeper than that level.  Superficial fibers of the plantar fascia were   identified and appeared completely normal.  The soft tissue lesion did not   penetrate into the plantar fascia.  After removal of the soft tissue ellipse,   the wound was copiously flushed with sterile saline.  Wound was again   inspected for other abnormal-appearing tissue of which none were found.  A 3-0   Vicryl was used to close the subcutaneous tissue and 4-0 nylon was used to   close the skin.  The lesion was placed in a sterile formaldehyde container and   sent to lab for gross and microscopic identification.  Ankle tourniquet   deflated.   Entire foot returned to well perfused, normal color.  Sterile   dressing applied to the foot.  Patient tolerated procedure and anesthesia well   with no problems or complications.  He left the operating room in apparent   normal condition.       ____________________________________     STEFFANY SILVER    DD:  07/17/2020 12:43:37  DT:  07/17/2020 13:14:39    D#:  [id number]  Job#:  [id number]

## 2020-07-17 NOTE — ANESTHESIA PROCEDURE NOTES
Airway    Date/Time: 7/17/2020 10:58 AM  Performed by: Bertrand Cat M.D.  Authorized by: Bertrand Cat M.D.     Location:  OR  Urgency:  Elective  Indications for Airway Management:  Anesthesia      Spontaneous Ventilation: absent    Sedation Level:  Deep  Preoxygenated: Yes    Final Airway Type:  Supraglottic airway  Final Supraglottic Airway:  Standard LMA    SGA Size:  4  Number of Attempts at Approach:  1

## 2020-07-17 NOTE — DISCHARGE INSTRUCTIONS
ACTIVITY: Rest and take it easy for the first 24 hours.  A responsible adult is recommended to remain with you during that time.  It is normal to feel sleepy.  We encourage you to not do anything that requires balance, judgment or coordination.    MILD FLU-LIKE SYMPTOMS ARE NORMAL. YOU MAY EXPERIENCE GENERALIZED MUSCLE ACHES, THROAT IRRITATION, HEADACHE AND/OR SOME NAUSEA.    FOR 24 HOURS DO NOT:  Drive, operate machinery or run household appliances.  Drink beer or alcoholic beverages.   Make important decisions or sign legal documents.    SPECIAL INSTRUCTIONS: Keep foot elevated; apply ice off and on; light weight bearing with shoe in place.     DIET: To avoid nausea, slowly advance diet as tolerated, avoiding spicy or greasy foods for the first day.  Add more substantial food to your diet according to your physician's instructions.  Babies can be fed formula or breast milk as soon as they are hungry.  INCREASE FLUIDS AND FIBER TO AVOID CONSTIPATION.    SURGICAL DRESSING/BATHING: Keep dressing clean and dry until follow up appointment.    FOLLOW-UP APPOINTMENT:  A follow-up appointment should be arranged with your doctor in as scheduled next week; call to schedule.    You should CALL YOUR PHYSICIAN if you develop:  Fever greater than 101 degrees F.  Pain not relieved by medication, or persistent nausea or vomiting.  Excessive bleeding (blood soaking through dressing) or unexpected drainage from the wound.  Extreme redness or swelling around the incision site, drainage of pus or foul smelling drainage.  Inability to urinate or empty your bladder within 8 hours.  Problems with breathing or chest pain.    You should call 911 if you develop problems with breathing or chest pain.  If you are unable to contact your doctor or surgical center, you should go to the nearest emergency room or urgent care center.  Physician's telephone #: [phone]    If any questions arise, call your doctor.  If your doctor is not  available, please feel free to call the Surgical Center at [phone].  The Center is open Monday through Friday from 7AM to 7PM.  You can also call the HEALTH HOTLINE open 24 hours/day, 7 days/week and speak to a nurse at [phone], or toll free at [phone].    A registered nurse may call you a few days after your surgery to see how you are doing after your procedure.    MEDICATIONS: Resume taking daily medication.  Take prescribed pain medication with food.  If no medication is prescribed, you may take non-aspirin pain medication if needed.  PAIN MEDICATION CAN BE VERY CONSTIPATING.  Take a stool softener or laxative such as senokot, pericolace, or milk of magnesia if needed.    Prescription given for Tramadol.  Last pain medication given at _______________.    If your physician has prescribed pain medication that includes Acetaminophen (Tylenol), do not take additional Acetaminophen (Tylenol) while taking the prescribed medication.    Depression / Suicide Risk    As you are discharged from this [facility] facility, it is important to learn how to keep safe from harming yourself.    Recognize the warning signs:  · Abrupt changes in personality, positive or negative- including increase in energy   · Giving away possessions  · Change in eating patterns- significant weight changes-  positive or negative  · Change in sleeping patterns- unable to sleep or sleeping all the time   · Unwillingness or inability to communicate  · Depression  · Unusual sadness, discouragement and loneliness  · Talk of wanting to die  · Neglect of personal appearance   · Rebelliousness- reckless behavior  · Withdrawal from people/activities they love  · Confusion- inability to concentrate     If you or a loved one observes any of these behaviors or has concerns about self-harm, here's what you can do:  · Talk about it- your feelings and reasons for harming yourself  · Remove any means that you might use to hurt yourself  (examples: pills, rope, extension cords, firearm)  · Get professional help from the community (Mental Health, Substance Abuse, psychological counseling)  · Do not be alone:Call your Safe Contact- someone whom you trust who will be there for you.  · Call your local CRISIS HOTLINE 720-4147 or 542-676-7463  · Call your local Children's Mobile Crisis Response Team [place] (968) 502-6612 or www.[employer]  · Call the toll free National Suicide Prevention Hotlines   · National Suicide Prevention Lifeline 157-648-CQZB (9876)  · National Hope Line Network 800-SUICIDE (291-3911)

## 2020-07-17 NOTE — OR NURSING
1154 Pt arrived from OR with Dr. Torres VSS, PIV infusing without issue.  Left foot dressing, CDI.  Foot elevated. Blow by O2 in use.   1210 MD at bedside.  Discussed surgery with pt.  Pt still pretty sleepy, but denies pain and nausea.  Call placed to traction r/t order for pt shoe.  They will tube it up.   1230 Called and updated pt  on POC.  Pt tolerating sips of water.    1300 Pt up to change clothes at bedside, tolerated well.   1310 Pt meets d/c criteria, feels ready to go home. PIV removed, pt tolerated well.  Called pt , he will pull up outside and  pt.   1315 Pt escorted out via wheelchair to d/c home.  D/C instructions reviewed with  at car.  Answered all questions and concerns.

## 2020-07-17 NOTE — OR SURGEON
Immediate Post OP Note    PreOp Diagnosis: vascular tumor left foot    PostOp Diagnosis: soft tissue tumor left foot    Procedure(s):  EXCISION, MASS-FOOT soft tissue TUMOR - Wound Class: Clean    Surgeon(s):  Lenny Silverman D.P.M.    Anesthesiologist/Type of Anesthesia:  Anesthesiologist: Bertrand Cat M.D./General    Surgical Staff:  Circulator: Jennifer Soni R.N.; Miriam Mcnally R.N.  Scrub Person: Aleksandar Green    Specimens removed if any:  ID Type Source Tests Collected by Time Destination   A : LEFT FOOT soft tissue TUMOR Other Other PATHOLOGY SPECIMEN Lenny Silverman D.P.M. 7/17/2020 1112        Estimated Blood Loss: <3ml    Findings: Lesion was excised as planned. Patient tolerated procedure and anesthesia with no problems.    Complications: none        7/17/2020 11:52 AM Lenny Silverman D.P.M.

## 2020-07-17 NOTE — ANESTHESIA POSTPROCEDURE EVALUATION
Patient: Karuna Reid    Procedure Summary     Date:  07/17/20 Room / Location:  [facility] ROOM 22 / SURGERY SAME DAY [facility]    Anesthesia Start:  1056 Anesthesia Stop:  1201    Procedure:  EXCISION, MASS-FOOT soft tissue TUMOR (Left Foot) Diagnosis:  (soft tissue tumor)    Surgeon:  Lenny Silverman D.P.M. Responsible Provider:  Bertrand Cat M.D.    Anesthesia Type:  general ASA Status:  2          Final Anesthesia Type: general  Last vitals  BP   Blood Pressure : 111/59    Temp   36.3 °C (97.4 °F)    Pulse   Pulse: 78   Resp   19    SpO2   100 %      Anesthesia Post Evaluation    Patient location during evaluation: PACU  Patient participation: complete - patient participated  Level of consciousness: awake and alert    Airway patency: patent  Anesthetic complications: no  Cardiovascular status: hemodynamically stable  Respiratory status: acceptable  Hydration status: euvolemic    PONV: none           Nurse Pain Score: 0 (NPRS)

## 2020-07-17 NOTE — ANESTHESIA QCDR
2019 [facility] Clinical Data Registry (for Quality Improvement)     Postoperative nausea/vomiting risk protocol (Adult = 18 yrs and Pediatric 3-17 yrs)- (430 and 463)  General inhalation anesthetic (NOT TIVA) with PONV risk factors: Yes  Provision of anti-emetic therapy with at least 2 different classes of agents: Yes   Patient DID NOT receive anti-emetic therapy and reason is documented in Medical Record:  N/A    Multimodal Pain Management- (477)  Non-emergent surgery AND patient age >= 18: Yes  Use of Multimodal Pain Management, two or more drugs and/or interventions, NOT including systemic opioids:   Exception: Documented allergy to multiple classes of analgesics:     Smoking Abstinence (404)  Patient is current smoker (cigarette, pipe, e-cig, marijuanna):   Elective Surgery:   Abstinence instructions provided prior to day of surgery:   Patient abstained from smoking on day of surgery:     Pre-Op Beta-Blocker in Isolated CABG (44)  Isolated CABG AND patient age >= 18:   Beta-blocker admin within 24 hours of surgical incision:   Exception:of medical reason(s) for not administering beta blocker within 24 hours prior to surgical incision (e.g., not  indicated,other medical reason):     PACU assessment of acute postoperative pain prior to Anesthesia Care End- Applies to Patients Age = 18- (ABG7)  Initial PACU pain score is which of the following: < 7/10  Patient unable to report pain score: N/A    Post-anesthetic transfer of care checklist/protocol to PACU/ICU- (426 and 427)  Upon conclusion of case, patient transferred to which of the following locations: PACU/Non-ICU  Use of transfer checklist/protocol: Yes  Exclusion: Service Performed in Patient Hospital Room (and thus did not require transfer): N/A  Unplanned admission to ICU related to anesthesia service up through end of PACU care- (MD51)  Unplanned admission to ICU (not initially anticipated at anesthesia start time): No

## 2020-09-01 ENCOUNTER — HOSPITAL ENCOUNTER (OUTPATIENT)
Dept: LAB | Facility: MEDICAL CENTER | Age: 65
End: 2020-09-01
Attending: INTERNAL MEDICINE
Payer: MEDICARE

## 2020-09-01 DIAGNOSIS — Z13.6 SCREENING FOR CARDIOVASCULAR CONDITION: ICD-10-CM

## 2020-09-01 LAB
ALBUMIN SERPL BCP-MCNC: 4.3 G/DL (ref 3.2–4.9)
ALBUMIN/GLOB SERPL: 1.5 G/DL
ALP SERPL-CCNC: 74 U/L (ref 30–99)
ALT SERPL-CCNC: 23 U/L (ref 2–50)
ANION GAP SERPL CALC-SCNC: 12 MMOL/L (ref 7–16)
AST SERPL-CCNC: 20 U/L (ref 12–45)
BILIRUB SERPL-MCNC: 0.4 MG/DL (ref 0.1–1.5)
BUN SERPL-MCNC: 16 MG/DL (ref 8–22)
CALCIUM SERPL-MCNC: 9.9 MG/DL (ref 8.5–10.5)
CHLORIDE SERPL-SCNC: 103 MMOL/L (ref 96–112)
CHOLEST SERPL-MCNC: 195 MG/DL (ref 100–199)
CO2 SERPL-SCNC: 25 MMOL/L (ref 20–33)
CREAT SERPL-MCNC: 0.74 MG/DL (ref 0.5–1.4)
FASTING STATUS PATIENT QL REPORTED: NORMAL
GLOBULIN SER CALC-MCNC: 2.9 G/DL (ref 1.9–3.5)
GLUCOSE SERPL-MCNC: 88 MG/DL (ref 65–99)
HDLC SERPL-MCNC: 56 MG/DL
LDLC SERPL CALC-MCNC: 120 MG/DL
POTASSIUM SERPL-SCNC: 3.8 MMOL/L (ref 3.6–5.5)
PROT SERPL-MCNC: 7.2 G/DL (ref 6–8.2)
SODIUM SERPL-SCNC: 140 MMOL/L (ref 135–145)
TRIGL SERPL-MCNC: 93 MG/DL (ref 0–149)

## 2020-09-01 PROCEDURE — 80053 COMPREHEN METABOLIC PANEL: CPT

## 2020-09-01 PROCEDURE — 36415 COLL VENOUS BLD VENIPUNCTURE: CPT

## 2020-09-01 PROCEDURE — 80061 LIPID PANEL: CPT

## 2020-09-08 ENCOUNTER — OFFICE VISIT (OUTPATIENT)
Dept: MEDICAL GROUP | Facility: MEDICAL CENTER | Age: 65
End: 2020-09-08
Payer: MEDICARE

## 2020-09-08 VITALS
HEART RATE: 70 BPM | SYSTOLIC BLOOD PRESSURE: 112 MMHG | HEIGHT: 64 IN | DIASTOLIC BLOOD PRESSURE: 68 MMHG | BODY MASS INDEX: 23.9 KG/M2 | WEIGHT: 140 LBS | TEMPERATURE: 98.2 F | OXYGEN SATURATION: 95 %

## 2020-09-08 DIAGNOSIS — M81.0 OSTEOPOROSIS, UNSPECIFIED OSTEOPOROSIS TYPE, UNSPECIFIED PATHOLOGICAL FRACTURE PRESENCE: ICD-10-CM

## 2020-09-08 DIAGNOSIS — R41.3 MEMORY LOSS OR IMPAIRMENT: ICD-10-CM

## 2020-09-08 DIAGNOSIS — Z00.00 MEDICARE ANNUAL WELLNESS VISIT, INITIAL: ICD-10-CM

## 2020-09-08 DIAGNOSIS — Z12.11 SCREEN FOR COLON CANCER: ICD-10-CM

## 2020-09-08 PROCEDURE — 99213 OFFICE O/P EST LOW 20 MIN: CPT | Mod: 25 | Performed by: INTERNAL MEDICINE

## 2020-09-08 PROCEDURE — 8041 PR SCP AHA: Performed by: INTERNAL MEDICINE

## 2020-09-08 PROCEDURE — G0438 PPPS, INITIAL VISIT: HCPCS | Performed by: INTERNAL MEDICINE

## 2020-09-08 ASSESSMENT — FIBROSIS 4 INDEX: FIB4 SCORE: 1.61

## 2020-09-08 ASSESSMENT — PATIENT HEALTH QUESTIONNAIRE - PHQ9: CLINICAL INTERPRETATION OF PHQ2 SCORE: 0

## 2020-09-08 ASSESSMENT — ACTIVITIES OF DAILY LIVING (ADL): BATHING_REQUIRES_ASSISTANCE: 0

## 2020-09-08 ASSESSMENT — ENCOUNTER SYMPTOMS: GENERAL WELL-BEING: GOOD

## 2020-09-08 NOTE — PROGRESS NOTES
Annual Health Assessment Questions:    1.  Are you currently engaging in any exercise or physical activity? No    2.  How would you describe your mood or emotional well-being today? good    3.  Have you had any falls in the last year? No    4.  Have you noticed any problems with your balance or had difficulty walking? No    5.  In the last six months have you experienced any leakage of urine? No    6. DPA/Advanced Directive: Patient has Advanced Directive, but it is not on file. Instructed to bring in a copy to scan into their chart.

## 2020-09-08 NOTE — PROGRESS NOTES
Annual Health Assessment Questions:    1.  Are you currently engaging in any exercise or physical activity? No    2.  How would you describe your mood or emotional well-being today? good    3.  Have you had any falls in the last year? No    4.  Have you noticed any problems with your balance or had difficulty walking? No    5.  In the last six months have you experienced any leakage of urine? No    6. DPA/Advanced Directive: Patient has Advanced Directive, but it is not on file. Instructed to bring in a copy to scan into their chart.        CC: Wellness examination complaining of memory loss.    HPI:   Karuna presents today with the following.      1. Medicare annual wellness visit, initial  Screenings performed below information given on advanced directives    2. Memory loss or impairment  Latanya complaining of difficulty with short-term memory in the setting of the family history of Alzheimer's.  She reports her short-term is becoming more problematic although she did well in 3-minute recall.  She forgets more trivial things and she reports her  has commented on her memory.  She does tend to write things down so she does not forget.  Denies any other neurologic symptoms no tremors.      Information for advance directives given to patient or instructed to bring in advance directives into to office to put in chart.      Depression Screening    Little interest or pleasure in doing things?  0 - not at all  Feeling down, depressed , or hopeless? 0 - not at all  Patient Health Questionnaire Score: 0     If depressive symptoms identified deferred to follow up visit unless specifically addressed in assessment and plan.    Interpretation of PHQ-9 Total Score   Score Severity   1-4 No Depression   5-9 Mild Depression   10-14 Moderate Depression   15-19 Moderately Severe Depression   20-27 Severe Depression    Screening for Cognitive Impairment    Three Minute Recall (river, nation, finger) 3/3    Jerod clock face with  all 12 numbers and set the hands to show 10 past 11.  Yes 4/5  Cognitive concerns identified deferred for follow up unless specifically addressed in assessment and plan.    Fall Risk Assessment    Has the patient had two or more falls in the last year or any fall with injury in the last year?  No    Safety Assessment    Throw rugs on floor.  Yes  Handrails on all stairs.  Yes  Good lighting in all hallways.  Yes  Difficulty hearing.  No  Patient counseled about all safety risks that were identified.    Functional Assessment ADLs    Are there any barriers preventing you from cooking for yourself or meeting nutritional needs?  No.    Are there any barriers preventing you from driving safely or obtaining transportation?  No.    Are there any barriers preventing you from using a telephone or calling for help?  No.    Are there any barriers preventing you from shopping?  No.    Are there any barriers preventing you from taking care of your own finances?  No.    Are there any barriers preventing you from managing your medications?  No.    Are there any barriers preventing you from showering, bathing or dressing yourself?  No.    Are you currently engaging in any exercise or physical activity?  No.     What is your perception of your health?  Good.      Health Maintenance Summary                Annual Wellness Visit Overdue 1955     IMM ZOSTER VACCINES Next Due 10/21/2020      Done 8/26/2020 Imm Admin: Zoster Vaccine Recombinant (RZV) (SHINGRIX)     Patient has more history with this topic...    COLONOSCOPY Next Due 10/30/2020      Done 10/30/2015 AMB REFERRAL TO GI FOR COLONOSCOPY     Patient has more history with this topic...    MAMMOGRAM Next Due 2/28/2021      Done 2/29/2020 MA-SCREENING MAMMO BILAT W/TOMOSYNTHESIS W/CAD     Patient has more history with this topic...    IMM PNEUMOCOCCAL VACCINE: 65+ Years Next Due 6/19/2021      Done 6/19/2020 Imm Admin: Pneumococcal Conjugate Vaccine (Prevnar/PCV-13)    BONE  DENSITY Next Due 2024      Done 2019 DS-BONE DENSITY STUDY (DEXA)     Patient has more history with this topic...    IMM DTaP/Tdap/Td Vaccine Next Due 2030      Done 2020 Imm Admin: Tdap Vaccine     Patient has more history with this topic...          Patient Care Team:  Lenny Alves M.D. as PCP - General (Internal Medicine)  Fabrice Carroll D.O. (Internal Medicine)  Mila David, PT, DPT as Physical Therapy (Physical Therapy)  Charity Sky PT (Inactive) as Physical Therapy (Physical Therapy)  Zenaida Cuba M.D. as Consulting Physician (OB/Gyn)            Health Care Screening: Age-appropriate preventive services that Medicare covers were discussed today and ordered as indicated and agreed upon by the patient, and as recommended by USPTF and ACIP.     Patient Active Problem List    Diagnosis Date Noted   • Osteoporosis 2018   • Carpal tunnel syndrome 2014   • Menopausal and postmenopausal disorder        No current outpatient medications on file.     No current facility-administered medications for this visit.        Family History   Problem Relation Age of Onset   • Psychiatric Illness Mother         Alzheimer's   • Cancer Maternal Aunt         breast cancer       Social History     Socioeconomic History   • Marital status:      Spouse name: Not on file   • Number of children: Not on file   • Years of education: Not on file   • Highest education level: Not on file   Occupational History   • Not on file   Social Needs   • Financial resource strain: Not on file   • Food insecurity     Worry: Not on file     Inability: Not on file   • Transportation needs     Medical: Not on file     Non-medical: Not on file   Tobacco Use   • Smoking status: Former Smoker     Packs/day: 1.00     Years: 23.00     Pack years: 23.00     Types: Cigarettes     Start date: 1970     Quit date: 1993     Years since quittin.7   • Smokeless tobacco: Never Used   • Tobacco  comment: continue to avoid   Substance and Sexual Activity   • Alcohol use: Yes     Alcohol/week: 0.6 oz     Types: 1 Standard drinks or equivalent per week     Comment: 2 per week   • Drug use: No   • Sexual activity: Never     Comment: , one daughter,    Lifestyle   • Physical activity     Days per week: Not on file     Minutes per session: Not on file   • Stress: Not on file   Relationships   • Social connections     Talks on phone: Not on file     Gets together: Not on file     Attends [employer] service: Not on file     Active member of club or organization: Not on file     Attends meetings of clubs or organizations: Not on file     Relationship status: Not on file   • Intimate partner violence     Fear of current or ex partner: Not on file     Emotionally abused: Not on file     Physically abused: Not on file     Forced sexual activity: Not on file   Other Topics Concern   • Not on file   Social History Narrative   • Not on file       Past Surgical History:   Procedure Laterality Date   • MASS EXCISION ORTHO Left 7/17/2020    Procedure: EXCISION, MASS-FOOT soft tissue TUMOR;  Surgeon: Lenny Silverman D.P.M.;  Location: SURGERY SAME DAY [facility];  Service: Podiatry   • CYSTOSCOPY  10/19/2016    Procedure: CYSTOSCOPY;  Surgeon: Lenny Cazares M.D.;  Location: SURGERY [facility];  Service:    • HYSTERECTOMY ROBOTIC  10/19/2016    Procedure: HYSTERECTOMY ROBOTIC Total with BILATERAL SALPINGO-OOPHORECTOMY;  Surgeon: Lenny Cazares M.D.;  Location: SURGERY [facility];  Service:    • ANTERIOR AND POSTERIOR REPAIR  10/19/2016    Procedure: POSTERIOR REPAIR;  Surgeon: Lenny Cazares M.D.;  Location: SURGERY [facility];  Service:    • VAGINAL SUSPENSION  10/19/2016    Procedure: VAGINAL SUSPENSION Uterosacral, and  McCalls Culdoplasty;  Surgeon: Lenny Cazares M.D.;  Location: SURGERY [facility];  Service:    • TONSILLECTOMY  4/18/2014    Performed by Nirali Dos Santos,  "M.D. at SURGERY [facility]   • OTHER SURGICAL PROCEDURE  1989    Dr Doss; Neck superficial exploration   • TONSILLECTOMY  1959       Allergies as of 09/08/2020 - Reviewed 09/08/2020   Allergen Reaction Noted   • Other drug Rash 09/29/2016        ROS: Denies Chest pain, SOB, LE edema.    /68 (BP Location: Left arm, Patient Position: Sitting)   Pulse 70   Temp 36.8 °C (98.2 °F)   Ht 1.626 m (5' 4\")   Wt 63.5 kg (140 lb)   SpO2 95%   BMI 24.03 kg/m²     Physical Exam:  Gen:         Alert and oriented, No apparent distress.  Neck:        No Lymphadenopathy or Bruits.  Lungs:     Clear to auscultation bilaterally  CV:          Regular rate and rhythm. No murmurs, rubs or gallops.  Abd:         Soft non tender, non distended. Normal active bowel sounds.  No  Hepatosplenomegaly, No pulsatile masses.                   Ext:          No clubbing, cyanosis, edema.      Assessment and Plan.   64 y.o. female with the following issues.    1. Medicare annual wellness visit, initial  Discussed healthy lifestyle habits as well as screening regimens.  Discussion about safe lifestyle practices, seatbelts, sunscreen, dietary recommendations.  - Initial Annual Wellness Visit - Includes PPPS ()    2. Memory loss or impairment  Given her discussions today and family history she would like to be seen by neurology referral placed today.  - REFERRAL TO NEUROLOGY    3. Osteoporosis, unspecified osteoporosis type, unspecified pathological fracture presence  Mild depression of T score by gynecology hesitant for medications due for repeat density test next year.  - Initial Annual Wellness Visit - Includes PPPS ()    4. Screen for colon cancer  She is coming due for colonoscopy referral placed today.  She did have polyps at her last visit.  - REFERRAL TO GASTROENTEROLOGY        Referrals offered: Community-based lifestyle interventions to reduce health risks and promote self-management and wellness, fall prevention, " nutrition, physical activity, tobacco-use cessation, weight loss, and mental health services as per orders if indicated.    Discussion today about general wellness and lifestyle habits:    · Prevent falls and reduce trip hazards; Cautioned about securing or removing rugs.  · Have a working fire alarm and carbon monoxide detector;   · Engage in regular physical activity and social activities

## 2020-09-15 ENCOUNTER — OFFICE VISIT (OUTPATIENT)
Dept: MEDICAL GROUP | Facility: MEDICAL CENTER | Age: 65
End: 2020-09-15
Payer: MEDICARE

## 2020-09-15 VITALS
HEART RATE: 69 BPM | OXYGEN SATURATION: 97 % | SYSTOLIC BLOOD PRESSURE: 120 MMHG | BODY MASS INDEX: 23.73 KG/M2 | DIASTOLIC BLOOD PRESSURE: 58 MMHG | HEIGHT: 64 IN | TEMPERATURE: 98.6 F | WEIGHT: 139 LBS

## 2020-09-15 DIAGNOSIS — M25.561 ACUTE PAIN OF RIGHT KNEE: ICD-10-CM

## 2020-09-15 DIAGNOSIS — R10.84 GENERALIZED ABDOMINAL PAIN: ICD-10-CM

## 2020-09-15 DIAGNOSIS — R41.3 MEMORY LOSS: ICD-10-CM

## 2020-09-15 PROCEDURE — 99215 OFFICE O/P EST HI 40 MIN: CPT | Performed by: FAMILY MEDICINE

## 2020-09-15 RX ORDER — BIOTIN 2500 MCG
CAPSULE ORAL
COMMUNITY

## 2020-09-15 RX ORDER — LUTEIN 10 MG
5 TABLET ORAL DAILY
COMMUNITY
End: 2023-10-19

## 2020-09-15 RX ORDER — CALCIUM CARBONATE 260MG(650)
9 TABLET,CHEWABLE ORAL
COMMUNITY
End: 2023-10-19

## 2020-09-15 RX ORDER — ASCORBIC ACID 1500 MG
1500 TABLET, EXTENDED RELEASE ORAL DAILY
COMMUNITY
End: 2023-10-19

## 2020-09-15 RX ORDER — CALCIUM CARBONATE 500 MG/1
500 TABLET, CHEWABLE ORAL DAILY
COMMUNITY

## 2020-09-15 RX ORDER — MV-MIN/FOLIC/VIT K/LYCOP/COQ10 200-100MCG
360 CAPSULE ORAL DAILY
COMMUNITY

## 2020-09-15 SDOH — ECONOMIC STABILITY: INCOME INSECURITY: IN THE LAST 12 MONTHS, WAS THERE A TIME WHEN YOU WERE NOT ABLE TO PAY THE MORTGAGE OR RENT ON TIME?: NO

## 2020-09-15 SDOH — SOCIAL STABILITY: SOCIAL NETWORK
IN A TYPICAL WEEK, HOW MANY TIMES DO YOU TALK ON THE PHONE WITH FAMILY, FRIENDS, OR NEIGHBORS?: MORE THAN THREE TIMES A WEEK

## 2020-09-15 SDOH — ECONOMIC STABILITY: INCOME INSECURITY: HOW HARD IS IT FOR YOU TO PAY FOR THE VERY BASICS LIKE FOOD, HOUSING, MEDICAL CARE, AND HEATING?: NOT HARD AT ALL

## 2020-09-15 SDOH — ECONOMIC STABILITY: FOOD INSECURITY: WITHIN THE PAST 12 MONTHS, THE FOOD YOU BOUGHT JUST DIDN'T LAST AND YOU DIDN'T HAVE MONEY TO GET MORE.: NEVER TRUE

## 2020-09-15 SDOH — SOCIAL STABILITY: SOCIAL NETWORK: ARE YOU MARRIED, WIDOWED, DIVORCED, SEPARATED, NEVER MARRIED, OR LIVING WITH A PARTNER?: MARRIED

## 2020-09-15 SDOH — ECONOMIC STABILITY: HOUSING INSECURITY
IN THE LAST 12 MONTHS, WAS THERE A TIME WHEN YOU DID NOT HAVE A STEADY PLACE TO SLEEP OR SLEPT IN A SHELTER (INCLUDING NOW)?: NO

## 2020-09-15 SDOH — HEALTH STABILITY: MENTAL HEALTH: HOW OFTEN DO YOU HAVE A DRINK CONTAINING ALCOHOL?: 2-4 TIMES A MONTH

## 2020-09-15 SDOH — HEALTH STABILITY: MENTAL HEALTH: HOW OFTEN DO YOU HAVE 6 OR MORE DRINKS ON ONE OCCASION?: NEVER

## 2020-09-15 SDOH — HEALTH STABILITY: PHYSICAL HEALTH: ON AVERAGE, HOW MANY DAYS PER WEEK DO YOU ENGAGE IN MODERATE TO STRENUOUS EXERCISE (LIKE A BRISK WALK)?: 0 DAYS

## 2020-09-15 SDOH — SOCIAL STABILITY: SOCIAL NETWORK
DO YOU BELONG TO ANY CLUBS OR ORGANIZATIONS SUCH AS CHURCH GROUPS UNIONS, FRATERNAL OR ATHLETIC GROUPS, OR SCHOOL GROUPS?: YES

## 2020-09-15 SDOH — HEALTH STABILITY: MENTAL HEALTH
STRESS IS WHEN SOMEONE FEELS TENSE, NERVOUS, ANXIOUS, OR CAN'T SLEEP AT NIGHT BECAUSE THEIR MIND IS TROUBLED. HOW STRESSED ARE YOU?: TO SOME EXTENT

## 2020-09-15 SDOH — HEALTH STABILITY: MENTAL HEALTH: HOW MANY STANDARD DRINKS CONTAINING ALCOHOL DO YOU HAVE ON A TYPICAL DAY?: 1 OR 2

## 2020-09-15 SDOH — ECONOMIC STABILITY: FOOD INSECURITY: WITHIN THE PAST 12 MONTHS, YOU WORRIED THAT YOUR FOOD WOULD RUN OUT BEFORE YOU GOT MONEY TO BUY MORE.: NEVER TRUE

## 2020-09-15 SDOH — ECONOMIC STABILITY: TRANSPORTATION INSECURITY
IN THE PAST 12 MONTHS, HAS LACK OF RELIABLE TRANSPORTATION KEPT YOU FROM MEDICAL APPOINTMENTS, MEETINGS, WORK OR FROM GETTING THINGS NEEDED FOR DAILY LIVING?: NO

## 2020-09-15 SDOH — ECONOMIC STABILITY: TRANSPORTATION INSECURITY
IN THE PAST 12 MONTHS, HAS THE LACK OF TRANSPORTATION KEPT YOU FROM MEDICAL APPOINTMENTS OR FROM GETTING MEDICATIONS?: NO

## 2020-09-15 SDOH — SOCIAL STABILITY: SOCIAL NETWORK: HOW OFTEN DO YOU GET TOGETHER WITH FRIENDS OR RELATIVES?: ONCE A WEEK

## 2020-09-15 SDOH — SOCIAL STABILITY: SOCIAL NETWORK: HOW OFTEN DO YOU ATTEND CHURCH OR RELIGIOUS SERVICES?: MORE THAN 4 TIMES PER YEAR

## 2020-09-15 SDOH — ECONOMIC STABILITY: HOUSING INSECURITY: IN THE LAST 12 MONTHS, HOW MANY PLACES HAVE YOU LIVED?: 1

## 2020-09-15 SDOH — SOCIAL STABILITY: SOCIAL NETWORK: HOW OFTEN DO YOU ATTENT MEETINGS OF THE CLUB OR ORGANIZATION YOU BELONG TO?: MORE THAN 4 TIMES PER YEAR

## 2020-09-15 SDOH — HEALTH STABILITY: PHYSICAL HEALTH: ON AVERAGE, HOW MANY MINUTES DO YOU ENGAGE IN EXERCISE AT THIS LEVEL?: 0 MINUTES

## 2020-09-15 SDOH — HEALTH STABILITY: PHYSICAL HEALTH: ON AVERAGE, HOW MANY MINUTES DO YOU ENGAGE IN EXERCISE AT THIS LEVEL?: 0 MIN

## 2020-09-15 SDOH — ECONOMIC STABILITY: TRANSPORTATION INSECURITY
IN THE PAST 12 MONTHS, HAS LACK OF TRANSPORTATION KEPT YOU FROM MEETINGS, WORK, OR FROM GETTING THINGS NEEDED FOR DAILY LIVING?: NO

## 2020-09-15 ASSESSMENT — SOCIAL DETERMINANTS OF HEALTH (SDOH)
HOW OFTEN DO YOU ATTENT MEETINGS OF THE CLUB OR ORGANIZATION YOU BELONG TO?: MORE THAN 4 TIMES PER YEAR
HOW OFTEN DO YOU HAVE A DRINK CONTAINING ALCOHOL: 2-4 TIMES A MONTH
WITHIN THE PAST 12 MONTHS, THE FOOD YOU BOUGHT JUST DIDN'T LAST AND YOU DIDN'T HAVE MONEY TO GET MORE: NEVER TRUE
HOW HARD IS IT FOR YOU TO PAY FOR THE VERY BASICS LIKE FOOD, HOUSING, MEDICAL CARE, AND HEATING?: NOT HARD AT ALL
HOW OFTEN DO YOU HAVE SIX OR MORE DRINKS ON ONE OCCASION: NEVER
HOW OFTEN DO YOU ATTEND CHURCH OR RELIGIOUS SERVICES?: MORE THAN 4 TIMES PER YEAR
HOW MANY DRINKS CONTAINING ALCOHOL DO YOU HAVE ON A TYPICAL DAY WHEN YOU ARE DRINKING: 1 OR 2
DO YOU BELONG TO ANY CLUBS OR ORGANIZATIONS SUCH AS CHURCH GROUPS UNIONS, FRATERNAL OR ATHLETIC GROUPS, OR SCHOOL GROUPS?: YES
HOW OFTEN DO YOU GET TOGETHER WITH FRIENDS OR RELATIVES?: ONCE A WEEK
WITHIN THE PAST 12 MONTHS, YOU WORRIED THAT YOUR FOOD WOULD RUN OUT BEFORE YOU GOT THE MONEY TO BUY MORE: NEVER TRUE
IN A TYPICAL WEEK, HOW MANY TIMES DO YOU TALK ON THE PHONE WITH FAMILY, FRIENDS, OR NEIGHBORS?: MORE THAN THREE TIMES A WEEK

## 2020-09-15 ASSESSMENT — FIBROSIS 4 INDEX: FIB4 SCORE: 1.61

## 2020-09-15 NOTE — ASSESSMENT & PLAN NOTE
New problem. Reports pain with kneeling. This has been present for about a week or more. She does think she may have hit it on a fire hydrant.

## 2020-09-16 ENCOUNTER — HOSPITAL ENCOUNTER (OUTPATIENT)
Dept: RADIOLOGY | Facility: MEDICAL CENTER | Age: 65
End: 2020-09-16
Attending: INTERNAL MEDICINE
Payer: MEDICARE

## 2020-09-16 DIAGNOSIS — K76.89 HEPATIC CYST: ICD-10-CM

## 2020-09-16 DIAGNOSIS — K76.9 LIVER DISEASE: ICD-10-CM

## 2020-09-16 PROCEDURE — 700117 HCHG RX CONTRAST REV CODE 255: Performed by: INTERNAL MEDICINE

## 2020-09-16 PROCEDURE — 74160 CT ABDOMEN W/CONTRAST: CPT

## 2020-09-16 RX ADMIN — IOHEXOL 100 ML: 350 INJECTION, SOLUTION INTRAVENOUS at 09:00

## 2020-09-17 PROBLEM — R10.84 GENERALIZED ABDOMINAL PAIN: Status: ACTIVE | Noted: 2020-09-17

## 2020-09-17 NOTE — ASSESSMENT & PLAN NOTE
New problem.  The patient reports that she has had abdominal pain for the past year or so.  She is concerned that it is due to her history of cystocele repair.  She states mesh was placed and she has felt the mesh Pap and slide down the left side of her abdomen.  She now has intermittent pain in her abdomen that comes and goes.  She states she feels that it has slid down into her left leg.

## 2020-09-17 NOTE — PROGRESS NOTES
Subjective:     CC: Diagnoses of Memory loss, Acute pain of right knee, and Generalized abdominal pain were pertinent to this visit.    HPI: Patient is a 64 y.o. female established patient who presents today to establish care.      Memory loss  Chronic problem.  Discussed with Dr. Alves on 9/3/2020.  Dr. Alves placed a referral to neurology.  Has appt with neurology in October ( next month).     Acute pain of right knee  New problem. Reports pain with kneeling. This has been present for about a week or more. She does think she may have hit it on a fire hydrant.     Generalized abdominal pain  New problem.  The patient reports that she has had abdominal pain for the past year or so.  She is concerned that it is due to her history of cystocele repair.  She states mesh was placed and she has felt the mesh Pap and slide down the left side of her abdomen.  She now has intermittent pain in her abdomen that comes and goes.  She states she feels that it has slid down into her left leg.      Past Medical History:   Diagnosis Date   • Menopausal and postmenopausal disorder    • Urinary bladder disorder        Social History     Tobacco Use   • Smoking status: Former Smoker     Packs/day: 1.00     Years: 23.00     Pack years: 23.00     Types: Cigarettes     Start date: 1970     Quit date: 1993     Years since quittin.7   • Smokeless tobacco: Never Used   • Tobacco comment: continue to avoid   Substance Use Topics   • Alcohol use: Yes     Alcohol/week: 0.6 oz     Types: 1 Standard drinks or equivalent per week     Frequency: 2-4 times a month     Drinks per session: 1 or 2     Binge frequency: Never     Comment: 2 per week   • Drug use: No       Current Outpatient Medications Ordered in Epic   Medication Sig Dispense Refill   • Ascorbic Acid (VITAMIN C CR) 1500 MG Tab CR Take  by mouth.     • vitamin E 200 UNIT capsule Take 200 Units by mouth every day.     • Copper 5 MG Tab Take  by mouth.     • Zinc 10 MG  "Lozenge Spray  in mouth/throat.     • Omega-3 350 MG CAPSULE DELAYED RELEASE Take  by mouth.     • Lutein 10 MG Tab Take  by mouth.     • Multiple Vitamins-Minerals (MULTIVITAMIN ADULT PO) Take  by mouth.     • Biotin 2500 MCG Cap Take  by mouth.     • calcium carbonate (TUMS) 500 MG Chew Tab Take 500 mg by mouth every day.       No current Epic-ordered facility-administered medications on file.        Allergies:  Other drug    Health Maintenance: Completed    ROS:  Gen: no fevers/chill, no changes in weight  Eyes: no changes in vision  ENT: no sore throat, no hearing loss, no bloody nose  Pulm: no sob, no cough  CV: no chest pain, no palpitations  GI: no nausea/vomiting, no diarrhea  : no dysuria  MSk: no myalgias  Skin: no rash  Neuro: no headaches, no numbness/tingling  Heme/Lymph: no easy bruising      Objective:       Exam:  /58 (BP Location: Right arm, Patient Position: Sitting, BP Cuff Size: Adult long)   Pulse 69   Temp 37 °C (98.6 °F) (Temporal)   Ht 1.626 m (5' 4\")   Wt 63 kg (139 lb)   SpO2 97%   BMI 23.86 kg/m²  Body mass index is 23.86 kg/m².    General: Normal appearing. No distress.  HEAD: NCAT  EYES: conjunctiva clear, lids without ptosis, pupils equal and reactive to light  EARS: ears normal shape and contour.  MOUTH: normal dentition   Neck:  Normal ROM  Pulmonary: There to auscultation bilaterally, no wheezes rales or rhonchi.  Normal effort. Normal respiratory rate.  Cardiovascular: Regular rate and rhythm, no murmurs rubs or gallops.  Well perfused. No LE edema  Abdomen: Soft, nontender, nondistended.  Neurologic: Grossly normal, no focal deficits  Skin: Warm and dry.  No obvious lesions.  Musculoskeletal: Normal gait and station.   Psych: Normal mood and affect. Alert and oriented x3. Judgment and insight is normal.    Labs: 9/1/20 Results reviewed and discussed with the patient, questions answered.    Assessment & Plan:     64 y.o. female with the following -     1. Memory " loss  Chronic problem, discussed with Dr. Alves on 9/8/2020, referral placed, she has an appointment in October with neurology.    2. Acute pain of right knee  New problem, has been present for the last few days.  Only painful with kneeling.  Feels that she may have hit the knee earlier this week.  Plan to continue to monitor for now, if no improvement may consider physical therapy.    3. Generalized abdominal pain  Chronic problem, new to discuss.  The patient reports that she felt a popping sensation and is worried about the mesh in her abdomen.  CT abdomen pelvis ordered.  - CT-ABDOMEN-PELVIS WITH; Future      Return if symptoms worsen or fail to improve.    Please note that this dictation was created using voice recognition software. I have made every reasonable attempt to correct obvious errors, but I expect that there are errors of grammar and possibly content that I did not discover before finalizing the note.

## 2020-09-21 ENCOUNTER — HOSPITAL ENCOUNTER (OUTPATIENT)
Dept: RADIOLOGY | Facility: MEDICAL CENTER | Age: 65
End: 2020-09-21
Attending: FAMILY MEDICINE
Payer: MEDICARE

## 2020-09-21 DIAGNOSIS — R10.84 GENERALIZED ABDOMINAL PAIN: ICD-10-CM

## 2020-09-21 PROCEDURE — 700117 HCHG RX CONTRAST REV CODE 255: Performed by: FAMILY MEDICINE

## 2020-09-21 PROCEDURE — 74177 CT ABD & PELVIS W/CONTRAST: CPT

## 2020-09-21 RX ADMIN — IOHEXOL 100 ML: 350 INJECTION, SOLUTION INTRAVENOUS at 10:00

## 2020-09-21 RX ADMIN — IOHEXOL 25 ML: 240 INJECTION, SOLUTION INTRATHECAL; INTRAVASCULAR; INTRAVENOUS; ORAL at 10:00

## 2020-09-28 ENCOUNTER — TELEPHONE (OUTPATIENT)
Dept: MEDICAL GROUP | Facility: MEDICAL CENTER | Age: 65
End: 2020-09-28

## 2020-10-02 ENCOUNTER — OFFICE VISIT (OUTPATIENT)
Dept: MEDICAL GROUP | Facility: MEDICAL CENTER | Age: 65
End: 2020-10-02
Payer: MEDICARE

## 2020-10-02 VITALS
WEIGHT: 139 LBS | OXYGEN SATURATION: 95 % | BODY MASS INDEX: 23.73 KG/M2 | TEMPERATURE: 98.5 F | DIASTOLIC BLOOD PRESSURE: 54 MMHG | HEIGHT: 64 IN | HEART RATE: 67 BPM | SYSTOLIC BLOOD PRESSURE: 96 MMHG

## 2020-10-02 DIAGNOSIS — R10.84 GENERALIZED ABDOMINAL PAIN: ICD-10-CM

## 2020-10-02 PROCEDURE — 99213 OFFICE O/P EST LOW 20 MIN: CPT | Performed by: FAMILY MEDICINE

## 2020-10-02 RX ORDER — POLYETHYLENE GLYCOL-3350 AND ELECTROLYTES 236; 6.74; 5.86; 2.97; 22.74 G/274.31G; G/274.31G; G/274.31G; G/274.31G; G/274.31G
POWDER, FOR SOLUTION ORAL
COMMUNITY
Start: 2020-09-23 | End: 2021-04-27

## 2020-10-02 ASSESSMENT — FIBROSIS 4 INDEX: FIB4 SCORE: 1.61

## 2020-10-02 NOTE — PROGRESS NOTES
Subjective:     CC: The encounter diagnosis was Generalized abdominal pain.    HPI: Patient is a 64 y.o. female established patient who presents today to follow-up on abdominal pain.      Generalized abdominal pain  We are reviewing the patient's CT abdomen/pelvis.  It was completely normal aside from minute fatty paraumbilical and right indirect inguinal hernias.  She also has stable hepatic hypodensity which was quite small, likely atypical hemangioma.  The patient states that currently she is feeling quite well and denies any abdominal pain currently.  She feels very reassured regarding the imaging.  We did discuss that she had some possible constipation noted on the CT, she reports she had some loose stools following her CT scan with the oral contrast and denies any constipation currently.    Past Medical History:   Diagnosis Date   • Menopausal and postmenopausal disorder    • Urinary bladder disorder        Social History     Tobacco Use   • Smoking status: Former Smoker     Packs/day: 1.00     Years: 23.00     Pack years: 23.00     Types: Cigarettes     Start date: 1970     Quit date: 1993     Years since quittin.7   • Smokeless tobacco: Never Used   • Tobacco comment: continue to avoid   Substance Use Topics   • Alcohol use: Yes     Alcohol/week: 0.6 oz     Types: 1 Standard drinks or equivalent per week     Frequency: 2-4 times a month     Drinks per session: 1 or 2     Binge frequency: Never     Comment: 2 per week   • Drug use: No       Current Outpatient Medications Ordered in Epic   Medication Sig Dispense Refill   • Ascorbic Acid (VITAMIN C CR) 1500 MG Tab CR Take  by mouth.     • vitamin E 200 UNIT capsule Take 200 Units by mouth every day.     • Copper 5 MG Tab Take  by mouth.     • Omega-3 350 MG CAPSULE DELAYED RELEASE Take  by mouth.     • Lutein 10 MG Tab Take  by mouth.     • Multiple Vitamins-Minerals (MULTIVITAMIN ADULT PO) Take  by mouth.     • Biotin 2500 MCG Cap Take  by mouth.  "    • calcium carbonate (TUMS) 500 MG Chew Tab Take 500 mg by mouth every day.     • GAVILYTE-G 236 g Recon Soln      • Zinc 10 MG Lozenge Spray  in mouth/throat.       No current Epic-ordered facility-administered medications on file.        Allergies:  Other drug    Health Maintenance: Completed        Objective:       Exam:  BP (!) 96/54 (BP Location: Right arm, Patient Position: Sitting, BP Cuff Size: Adult long)   Pulse 67   Temp 36.9 °C (98.5 °F) (Temporal)   Ht 1.626 m (5' 4\")   Wt 63 kg (139 lb)   SpO2 95%   BMI 23.86 kg/m²  Body mass index is 23.86 kg/m².    General: Normal appearing. No distress.  HEAD: NCAT  EYES: conjunctiva clear, lids without ptosis, pupils equal and reactive to light  EARS: ears normal shape and contour.  MOUTH: normal dentition   Neck:  Normal ROM  Pulmonary: Normal effort. Normal respiratory rate.  Cardiovascular: Well perfused. No LE edema  Neurologic: Grossly normal, no focal deficits  Skin: Warm and dry.  No obvious lesions.  Musculoskeletal: Normal gait and station.   Psych: Normal mood and affect. Alert and oriented x3. Judgment and insight is normal.    Imagin2020 results reviewed and discussed with the patient, questions answered.    Assessment & Plan:     64 y.o. female with the following -     1. Generalized abdominal pain  Chronic problem, apparently now resolved, the patient reports that she no longer is having abdominal pain.  Reviewed her CT abdomen pelvis as well as CT liver in depth today.  The patient felt reassured.  Plan to follow-up as needed.      Return in about 1 year (around 10/2/2021).    Please note that this dictation was created using voice recognition software. I have made every reasonable attempt to correct obvious errors, but I expect that there are errors of grammar and possibly content that I did not discover before finalizing the note.      "

## 2020-10-27 ENCOUNTER — OFFICE VISIT (OUTPATIENT)
Dept: NEUROLOGY | Facility: MEDICAL CENTER | Age: 65
End: 2020-10-27
Payer: MEDICARE

## 2020-10-27 VITALS
OXYGEN SATURATION: 95 % | BODY MASS INDEX: 25.27 KG/M2 | HEART RATE: 89 BPM | SYSTOLIC BLOOD PRESSURE: 102 MMHG | TEMPERATURE: 98.2 F | WEIGHT: 142.64 LBS | HEIGHT: 63 IN | DIASTOLIC BLOOD PRESSURE: 58 MMHG

## 2020-10-27 DIAGNOSIS — R41.89 COGNITIVE IMPAIRMENT: ICD-10-CM

## 2020-10-27 DIAGNOSIS — R41.3 MEMORY LOSS: ICD-10-CM

## 2020-10-27 PROCEDURE — 99205 OFFICE O/P NEW HI 60 MIN: CPT | Performed by: PSYCHIATRY & NEUROLOGY

## 2020-10-27 ASSESSMENT — ENCOUNTER SYMPTOMS
CONSTIPATION: 0
MEMORY LOSS: 1
HALLUCINATIONS: 0
INSOMNIA: 0
NERVOUS/ANXIOUS: 0
FALLS: 0
DEPRESSION: 0

## 2020-10-27 ASSESSMENT — FIBROSIS 4 INDEX: FIB4 SCORE: 1.61

## 2020-10-28 NOTE — PROGRESS NOTES
Subjective:      Karuna Reid is a 64 y.o. female who presents with her  Rolf Carter), from the office of Dr. Kiesha Briscoe MD, for consultation, with a history of memory loss.  Her  adds some additional commentary.    OSCAR Beckman is a pleasant 64-year-old right-handed woman who states the memory problems have a lot to do with difficulty remembering names of individuals, but also formal names of places, buildings, etc.  With cueing from others, she does eventually recognize the appropriate name, she can then use these very same titles appropriately and other conversation.  There is still halting quality to her speech as a result.  Though she may repeat herself on an occasion, she does not require regular reminders given multiple times about recent events.  Recall of details is still quite good.  She still can engage in complex conversation, reads easily.    Still, some routine tasks do take her a little bit longer.  Cooking is one such routine.  Parrish now has to help.  Though she denies feeling more distractible, task completion is becoming a bit more difficult for her.  She is a little slower in doing these routine things, so if distraction does occur, things can be left.  She finds it more difficult making decisions.  When she does do so, it is appropriate.    She is driving safely, she takes mostly vitamins, but does these appropriately and regularly.  There has been no real change in her behavior or personality.  She denies feeling more depressed, has not become more withdrawn, has not been more irritable with frustrations.  She has been independent with her ADLs.  She dresses appropriately.  She walks in steady fashion, neither has noted real change in postural stability resulting in frequent falls.  Bowel and bladder control are maintained.  She denies tremor or loss of dexterity with her hands, voice volume and quality are maintained.  She has not been hallucinating.  She sleeps  "well.    She drives safely, does not require the use of lists any more often than usual.  Finances and numbers have not become more problematic.    A directed work-up has not been pursued, she has not been treated as a result.    There is no medical or surgical history of note.  She has never been told she suffers from seizures, migraine, stroke, liver or kidney disease, diabetes, blood dyscrasia, autoimmune disease, psychiatric disease, CAD, PVD, hypertension, or MS.    Her mother and several of her mother's siblings have all suffered from Alzheimer's disease.  She knows  Nothing of her father let alone his medical history.  Her brother, a half sibling on her mother side, has no memory issues.    She occasionally drinks alcohol, does not smoke, has a high school level education +2 years of additional schooling.  She is a retired .    Her supplements include biotin, vitamin C, copper, lutein, omega-3 fish oil, vitamin E, zinc and copper.    Review of Systems   Constitutional: Negative for malaise/fatigue.   Gastrointestinal: Negative for constipation.   Genitourinary: Negative for frequency.   Musculoskeletal: Negative for falls.   Psychiatric/Behavioral: Positive for memory loss. Negative for depression and hallucinations. The patient is not nervous/anxious and does not have insomnia.    All other systems reviewed and are negative.       Objective:     /58 (BP Location: Right arm, Patient Position: Sitting, BP Cuff Size: Adult)   Pulse 89   Temp 36.8 °C (98.2 °F) (Temporal)   Ht 1.6 m (5' 3\")   Wt 64.7 kg (142 lb 10.2 oz)   SpO2 95%   BMI 25.27 kg/m²      Physical Exam    She appears in no acute distress.  Clean and appropriately dressed, she is quite cooperative.  She is very pleasant and spirit and demeanor.  Vital signs are stable.  There is no malar rash or sialorrhea.  The neck is supple, range of motion is full.  Cardiac evaluation is unremarkable.    She is fully oriented, there " is no apraxia or inattention.  MOCA is 22/30.  She has a little difficulty with pattern recognition on visual spatial testing, but there is notable problems with delayed recall, even with cueing.  Mental processing seemed a little slow.  There is some issue with language, there is a mild reduction in fluency with routine speech, but repetition and word finding are curtailed.  Frontal release signs are absent, there is no rostrocaudal extinction.  Phone numbers, addresses, etc. are e`asily recalled.  Mood is euthymic, affect is mood appropriate.    PERRLA/EOMI, visual fields are full to finger counting on confrontation bilaterally.  Funduscopic exam reveals a sharp disc margin on both sides.  There is no hypophonia or tachyphemia, eye blink frequency is maintained.  The tongue and uvula are midline.  Temperature and light touch are intact bilaterally.  Shoulder shrug and head rotation are intact.  There is no bulbar dysfunction.    Musculoskeletal exam reveals no tremor, but with distraction, there is bilateral upper extremity rigidity with cogwheeling at the wrists.  Still, there is no tremulousness either at rest or with action.  There is no bradykinesia.  Strength is 5/5 bilaterally.  Reflexes are brisk but present without asymmetry, there are no pathologic reflexes, both toes are downgoing.    She stands easily, arm swing is symmetric, gait is normal and station and stride length.  There is no appendicular dystaxia.  Heel, toe, and tandem walking are all normal.  Repetitive movements and fine motor control are also symmetric with normal amplitude and frequencies in all 4 extremities.    Sensory exam is intact to vibration and temperature, Romberg is absent.     Assessment/Plan:     1. Cognitive impairment  Though mild, the history and some of the findings on exam suggest a seeming selective impairment with language.  This may require a more in-depth neuropsychological evaluation, but the first stages would  include MRI imaging of the brain as well as functional imaging with PET.  Whether underlying structural pathology such as ischemia, mass, inflammation, etc. are present, she has no history of risk for any of these.  This is nothing to suggest NPH.  This is not Parkinson's disease nor Lewy Body Disease.  She is not severely depressed.  I do not think that nonconvulsive seizures are really playing a role.    She has risk for degenerative disease such as Alzheimer's given her age, sex, and family history.  Still, they were both told that the diagnostics are being done to rule out other causes of secondary cognitive impairment that are more directly treatable.  If ruled out, we are than left with the most part observation and time.  We will talk about symptomatic treatments moving forwards.    We did talk about overall physical health, dietary changes, sleep hygiene, etc., as all being critical.  We will follow-up in about 4 months.    - MR-BRAIN-W/O; Future  - NM-PET-METABOLIC EVALUATION - BRAIN; Future  - TSH; Future  - SPEP W/REFLEX TO KLAUDIA, A, G, M; Future  - VITAMIN B12; Future  - FOLATE; Future  - Sed Rate; Future  - MISCELLANEOUS LAB TEST ([facility]/Other); Future    Time: 60 minutes spent face-to-face for exam, review, discussion, and education, of this over 50% of the time spent counseling and coordinating care.

## 2020-11-02 ENCOUNTER — HOSPITAL ENCOUNTER (OUTPATIENT)
Dept: LAB | Facility: MEDICAL CENTER | Age: 65
End: 2020-11-02
Attending: PSYCHIATRY & NEUROLOGY
Payer: MEDICARE

## 2020-11-02 ENCOUNTER — HOSPITAL ENCOUNTER (OUTPATIENT)
Dept: RADIOLOGY | Facility: MEDICAL CENTER | Age: 65
End: 2020-11-02
Attending: PSYCHIATRY & NEUROLOGY
Payer: MEDICARE

## 2020-11-02 ENCOUNTER — TELEPHONE (OUTPATIENT)
Dept: NEUROLOGY | Facility: MEDICAL CENTER | Age: 65
End: 2020-11-02

## 2020-11-02 DIAGNOSIS — R41.89 COGNITIVE IMPAIRMENT: ICD-10-CM

## 2020-11-02 DIAGNOSIS — R41.3 MEMORY LOSS: ICD-10-CM

## 2020-11-02 LAB
ERYTHROCYTE [SEDIMENTATION RATE] IN BLOOD BY WESTERGREN METHOD: 12 MM/HOUR (ref 0–30)
FOLATE SERPL-MCNC: 19.4 NG/ML
TSH SERPL DL<=0.005 MIU/L-ACNC: 2.47 UIU/ML (ref 0.38–5.33)
VIT B12 SERPL-MCNC: 950 PG/ML (ref 211–911)

## 2020-11-02 PROCEDURE — 36415 COLL VENOUS BLD VENIPUNCTURE: CPT

## 2020-11-02 PROCEDURE — 70551 MRI BRAIN STEM W/O DYE: CPT

## 2020-11-02 PROCEDURE — 82607 VITAMIN B-12: CPT

## 2020-11-02 PROCEDURE — 84443 ASSAY THYROID STIM HORMONE: CPT

## 2020-11-02 PROCEDURE — 82746 ASSAY OF FOLIC ACID SERUM: CPT

## 2020-11-02 PROCEDURE — 84155 ASSAY OF PROTEIN SERUM: CPT

## 2020-11-02 PROCEDURE — 81401 MOPATH PROCEDURE LEVEL 2: CPT

## 2020-11-02 PROCEDURE — 84165 PROTEIN E-PHORESIS SERUM: CPT

## 2020-11-02 PROCEDURE — 85652 RBC SED RATE AUTOMATED: CPT

## 2020-11-02 NOTE — TELEPHONE ENCOUNTER
Lab called in regards to APO - genotype which test to run on it . I pulled your notes to help figure out the test that needs to be ran . For the genotype it was a cholesterol or Alzheimer . Per notes it looks like you mentioned alzheimer runs in her family. I gave the ok for it . Is that test the right one to run ?

## 2020-11-03 ENCOUNTER — HOSPITAL ENCOUNTER (OUTPATIENT)
Dept: RADIOLOGY | Facility: MEDICAL CENTER | Age: 65
End: 2020-11-03
Attending: PSYCHIATRY & NEUROLOGY
Payer: MEDICARE

## 2020-11-03 DIAGNOSIS — R41.3 MEMORY LOSS: ICD-10-CM

## 2020-11-03 DIAGNOSIS — R41.89 COGNITIVE IMPAIRMENT: ICD-10-CM

## 2020-11-03 PROCEDURE — A9552 F18 FDG: HCPCS

## 2020-11-06 LAB
ALBUMIN SERPL ELPH-MCNC: 4.14 G/DL (ref 3.75–5.01)
ALPHA1 GLOB SERPL ELPH-MCNC: 0.25 G/DL (ref 0.19–0.46)
ALPHA2 GLOB SERPL ELPH-MCNC: 0.59 G/DL (ref 0.48–1.05)
B-GLOBULIN SERPL ELPH-MCNC: 0.68 G/DL (ref 0.48–1.1)
GAMMA GLOB SERPL ELPH-MCNC: 1.34 G/DL (ref 0.62–1.51)
INTERPRETATION SERPL IFE-IMP: NORMAL
MONOCLON BAND OBS SERPL: NORMAL
PATHOLOGY STUDY: NORMAL
PROT SERPL-MCNC: 7 G/DL (ref 6.3–8.2)
TEST NAME 95000: ABNORMAL

## 2020-12-01 RX ORDER — UBIDECARENONE 100 MG
CAPSULE ORAL
COMMUNITY
Start: 2020-11-02 | End: 2023-10-19

## 2021-01-12 ENCOUNTER — OFFICE VISIT (OUTPATIENT)
Dept: MEDICAL GROUP | Facility: MEDICAL CENTER | Age: 66
End: 2021-01-12
Payer: MEDICARE

## 2021-01-12 VITALS
TEMPERATURE: 99.2 F | DIASTOLIC BLOOD PRESSURE: 68 MMHG | BODY MASS INDEX: 25.87 KG/M2 | OXYGEN SATURATION: 94 % | HEIGHT: 63 IN | SYSTOLIC BLOOD PRESSURE: 118 MMHG | WEIGHT: 146 LBS | RESPIRATION RATE: 12 BRPM | HEART RATE: 86 BPM

## 2021-01-12 DIAGNOSIS — Z12.31 ENCOUNTER FOR SCREENING MAMMOGRAM FOR MALIGNANT NEOPLASM OF BREAST: ICD-10-CM

## 2021-01-12 DIAGNOSIS — Z90.710 HISTORY OF HYSTERECTOMY: ICD-10-CM

## 2021-01-12 DIAGNOSIS — R10.84 GENERALIZED ABDOMINAL PAIN: ICD-10-CM

## 2021-01-12 PROCEDURE — 99213 OFFICE O/P EST LOW 20 MIN: CPT | Performed by: FAMILY MEDICINE

## 2021-01-12 ASSESSMENT — FIBROSIS 4 INDEX: FIB4 SCORE: 1.64

## 2021-01-12 ASSESSMENT — PATIENT HEALTH QUESTIONNAIRE - PHQ9: CLINICAL INTERPRETATION OF PHQ2 SCORE: 0

## 2021-01-12 NOTE — ASSESSMENT & PLAN NOTE
Continues to think that something inside her snapped. Thought she had hooks/mesh placed.   THe pain in the abd improved but states that something fell down into her left hip/left leg. Feels that there are now pieces everywhere.   This all happened after her hysterectomy, states she had her bladder and colon pinned up.   Her initial surgery was done with Dr. Cazares. He is now retired.   She is aware there CT was normal.   Saw Dr Cuba, was advised there was nothing she could do.

## 2021-01-14 NOTE — PROGRESS NOTES
Subjective:     CC: Diagnoses of Generalized abdominal pain, History of hysterectomy, and Encounter for screening mammogram for malignant neoplasm of breast were pertinent to this visit.    HPI: Patient is a 65 y.o. female established patient who presents today with the following concern.       Generalized abdominal pain  Continues to think that something inside her snapped. Thought she had hooks/mesh placed when she had her hysterectomy. I have reviewed the surgical report and no mention of mesh.   Of note, we have gotten a CT abd/pel with no abnormalities.   The pain in the abd improved but states that something fell down into her left hip/left leg. Feels that there are now pieces everywhere.   This all happened after her hysterectomy, states she had her bladder and colon pinned up.   Her initial surgery was done with Dr. Cazares. He is now retired.   She is aware there CT was normal.   Saw Dr Cuba, was advised there was nothing she could do.   Now requesting referral for second opinion.        Past Medical History:   Diagnosis Date   • Menopausal and postmenopausal disorder    • Urinary bladder disorder        Social History     Tobacco Use   • Smoking status: Former Smoker     Packs/day: 1.00     Years: 23.00     Pack years: 23.00     Types: Cigarettes     Start date: 1970     Quit date: 1993     Years since quittin.0   • Smokeless tobacco: Never Used   • Tobacco comment: continue to avoid   Substance Use Topics   • Alcohol use: Yes     Alcohol/week: 0.6 oz     Types: 1 Standard drinks or equivalent per week     Frequency: 2-4 times a month     Drinks per session: 1 or 2     Binge frequency: Never     Comment: 2 per week   • Drug use: No       Current Outpatient Medications Ordered in Epic   Medication Sig Dispense Refill   • Cholecalciferol (D3-1000) 1000 UNIT Cap      • Ascorbic Acid (VITAMIN C CR) 1500 MG Tab CR Take 150 mg by mouth.     • vitamin E 200 UNIT capsule Take 200 Units by mouth  "every day.     • Copper 5 MG Tab Take 1 mg by mouth.     • Zinc 10 MG Lozenge Spray 9 mg in mouth/throat.     • Omega-3 350 MG CAPSULE DELAYED RELEASE Take  by mouth.     • Lutein 10 MG Tab Take  by mouth.     • Multiple Vitamins-Minerals (MULTIVITAMIN ADULT PO) Take  by mouth.     • Biotin 2500 MCG Cap Take  by mouth.     • calcium carbonate (TUMS) 500 MG Chew Tab Take 500 mg by mouth every day.     • GAVILYTE-G 236 g Recon Soln        No current Epic-ordered facility-administered medications on file.        Allergies:  Morphine and Other drug      Objective:       Exam:  /68 (BP Location: Right arm, Patient Position: Sitting, BP Cuff Size: Adult)   Pulse 86   Temp 37.3 °C (99.2 °F) (Temporal)   Resp 12   Ht 1.6 m (5' 3\")   Wt 66.2 kg (146 lb)   SpO2 94%   BMI 25.86 kg/m²  Body mass index is 25.86 kg/m².    General: Normal appearing. No distress.  HEAD: NCAT  EYES: conjunctiva clear, lids without ptosis, pupils equal and reactive to light  EARS: ears normal shape and contour.  MOUTH: normal dentition   Neck:  Normal ROM  Pulmonary: Normal effort. Normal respiratory rate.  Cardiovascular: Well perfused. No LE edema  Neurologic: Grossly normal, no focal deficits  Skin: Warm and dry.  No obvious lesions.  Musculoskeletal: Normal gait and station.   Psych: Normal mood and affect. Alert and oriented x3. Judgment and insight is normal      Assessment & Plan:     65 y.o. female with the following -     1. Generalized abdominal pain  Chronic problem. Pain is mostly resolved patient feel strongly that something that was placed during her hysterectomy snapped and settled into there left hip/thigh and now feels there are pieces everywhere. We reviewed her normal CT scan and discussed that there is no mention of mesh placement in the surgical report. Unfortunately her surgeon has since retired and she did not want to go back to Dr. Cuba. She requesting referral for second opinion which was placed today.   - " REFERRAL TO OB/GYN    2. History of hysterectomy  - REFERRAL TO OB/GYN    3. Encounter for screening mammogram for malignant neoplasm of breast  - [id number]; Future      No follow-ups on file.    Please note that this dictation was created using voice recognition software. I have made every reasonable attempt to correct obvious errors, but I expect that there are errors of grammar and possibly content that I did not discover before finalizing the note.

## 2021-01-22 ENCOUNTER — GYNECOLOGY VISIT (OUTPATIENT)
Dept: OBGYN | Facility: CLINIC | Age: 66
End: 2021-01-22
Payer: MEDICARE

## 2021-01-22 VITALS — DIASTOLIC BLOOD PRESSURE: 59 MMHG | SYSTOLIC BLOOD PRESSURE: 109 MMHG | WEIGHT: 145 LBS | BODY MASS INDEX: 25.69 KG/M2

## 2021-01-22 DIAGNOSIS — R10.13 EPIGASTRIC PAIN: ICD-10-CM

## 2021-01-22 PROCEDURE — 99203 OFFICE O/P NEW LOW 30 MIN: CPT | Performed by: OBSTETRICS & GYNECOLOGY

## 2021-01-22 ASSESSMENT — FIBROSIS 4 INDEX: FIB4 SCORE: 1.64

## 2021-01-22 NOTE — NON-PROVIDER
Pt here for new pt appt  Pt states here for abd pain, hx of hysterectomy. Pt thinks the mesh has came undone   Pharmacy verified  Good #:  [phone]   PAP: 2016   MAMMO:  02/2020 Benign

## 2021-01-22 NOTE — PROGRESS NOTES
Chief complaint: Abdominal pain    History of present illness:   65 y.o.  presents with above chief complaint.  Patient reports that she has been having some umbilical pain since her hysterectomy in .  She reports his pain began to worsen toward the end of  and became significant last year.  She did undergo CT scan of 2020 which showed some small umbilical hernia but no other abnormalities noted.  She does report his pain is present in the upper abdomen and sometimes can be worsened after eating certain foods, especially beings.  She does report sometimes it radiates down the left side of her body down to her legs.  She underwent a hysterectomy in 2016 secondary to prolapse, she had a robotic hysterectomy with bilateral salpingo-oophorectomy, anterior-posterior repair, Rod's culdoplasty and uterosacral ligament suspension.  No mesh was placed.      When asked, patient does report some reflux symptoms as well as significant bowel gas which causes her discomfort.    Patient is worried that the symptoms may be associated with her prior gynecological surgery.  She has been seen by general surgery as well as primary care provider.  She does take MiraLAX, Metamucil and Colace but still has some pain.  She underwent a colonoscopy 2020 with no abnormalities.  She has not undergone an upper endoscopy.    Ultrasound and CT scan do not show any evidence of gallbladder disease.    ROS: Pertinent positives documented in HPI and all other systems reviewed & are negative    POBHx:  4 para 1-0-3-1    PGYNHx: History of prolapse, status post repair, last pap unsure    All PMH, PSH, meds, allergies, social history and FH reviewed and updated today:  Past Medical History:   Diagnosis Date   • Arthritis    • GERD (gastroesophageal reflux disease)    • Menopausal and postmenopausal disorder    • Muscle disorder    • Osteoporosis    • Urinary bladder disorder        Past Surgical History:    Procedure Laterality Date   • MASS EXCISION ORTHO Left 2020    Procedure: EXCISION, MASS-FOOT soft tissue TUMOR;  Surgeon: Lenny Silverman D.P.M.;  Location: SURGERY SAME DAY [facility];  Service: Podiatry   • CYSTOSCOPY  10/19/2016    Procedure: CYSTOSCOPY;  Surgeon: Lenny Cazares M.D.;  Location: SURGERY [facility];  Service:    • HYSTERECTOMY ROBOTIC  10/19/2016    Procedure: HYSTERECTOMY ROBOTIC Total with BILATERAL SALPINGO-OOPHORECTOMY;  Surgeon: Lenny Cazares M.D.;  Location: SURGERY [facility];  Service:    • ANTERIOR AND POSTERIOR REPAIR  10/19/2016    Procedure: POSTERIOR REPAIR;  Surgeon: Lenny Cazares M.D.;  Location: SURGERY [facility];  Service:    • VAGINAL SUSPENSION  10/19/2016    Procedure: VAGINAL SUSPENSION Uterosacral, and  McCalls Culdoplasty;  Surgeon: Lenny Cazares M.D.;  Location: SURGERY [facility];  Service:    • TONSILLECTOMY  2014    Performed by Nirali Dos Santos M.D. at [facility]   • OTHER SURGICAL PROCEDURE      Dr Doss; Neck superficial exploration   • TONSILLECTOMY         Allergies:   Allergies   Allergen Reactions   • Morphine Diarrhea   • Other Drug Rash     Steroids, caused redness and rash       Social History     Socioeconomic History   • Marital status:      Spouse name: Not on file   • Number of children: Not on file   • Years of education: Not on file   • Highest education level: 12th grade   Occupational History   • Not on file   Social Needs   • Financial resource strain: Not hard at all   • Food insecurity     Worry: Never true     Inability: Never true   • Transportation needs     Medical: No     Non-medical: No   Tobacco Use   • Smoking status: Former Smoker     Packs/day: 1.00     Years: 23.00     Pack years: 23.00     Types: Cigarettes     Start date: 1970     Quit date: 1993     Years since quittin.0   • Smokeless tobacco: Never Used   • Tobacco comment: continue to avoid    Substance and Sexual Activity   • Alcohol use: Yes     Alcohol/week: 0.6 oz     Types: 1 Standard drinks or equivalent per week     Frequency: 2-4 times a month     Drinks per session: 1 or 2     Binge frequency: Never     Comment: 2 per week   • Drug use: No   • Sexual activity: Never     Comment: , one daughter,    Lifestyle   • Physical activity     Days per week: 0 days     Minutes per session: 0 min   • Stress: To some extent   Relationships   • Social connections     Talks on phone: More than three times a week     Gets together: Once a week     Attends [employer] service: More than 4 times per year     Active member of club or organization: Yes     Attends meetings of clubs or organizations: More than 4 times per year     Relationship status:    • Intimate partner violence     Fear of current or ex partner: Not on file     Emotionally abused: Not on file     Physically abused: Not on file     Forced sexual activity: Not on file   Other Topics Concern   • Not on file   Social History Narrative   • Not on file       Family History   Problem Relation Age of Onset   • Psychiatric Illness Mother         Alzheimer's   • Cancer Maternal Aunt         breast cancer       Physical exam:  /59   Wt 65.8 kg (145 lb)     GENERAL APPEARANCE: healthy, alert, no distress  NECK nontender, no masses, thyromegaly or nodules  HEART RRR with normal S1 and S2 ,no murmurs, no gallops, no peripheral edema  LUNG clear to auscultation, normal respiratory effort  ABDOMEN Abdomen soft, very mild tender to palpation epigastric area. BS normal. No masses,  No organomegaly  FEMALE GYN: normal female external genitalia without lesions, BUS normal without lesions, vaginal mucosa pink and moist, no vaginal discharge noted,  urethra is normal without discharge or scarring, no bladder fullness or masses, normal anus and perineum.  Uterus and adnexa absent.  Noted as well patient on examination  No inguinal  hernia present  EXTREMITIES:negative clubbing, cyanosis, edema    NEURO Awake, alert and oriented x 3, Normal gait, no sensory deficits  SKIN No rashes, or ulcers or lesions seen  PSYCHIATRIC: Patient shows appropriate affect, is alert and oriented x3, intact judgment and insight.      Assessment:  1. Epigastric pain         Plan:    I reviewed the patient's surgical records as well as her imaging studies.  I do not believe that this is gynecological in origin.  I discussed with the patient that no mesh was used on her repair and that the repair is holding well with no evidence of significant prolapse.    I did discuss with the patient that as the majority of her pain is present in the epigastric area and is worsened by certain types of foods, that is most likely GI in origin.  She does report some reflux-like symptoms at times.    Recommendations for the patient to begin Pepcid or Zantac twice daily as well as simethicone for gas issues.  Recommendations for the patient to also be seen by a GI specialist were made at this time.    Follow-up in 2 to 3 months for annual exam as the patient is overdue

## 2021-03-01 ENCOUNTER — HOSPITAL ENCOUNTER (OUTPATIENT)
Dept: RADIOLOGY | Facility: MEDICAL CENTER | Age: 66
End: 2021-03-01
Attending: FAMILY MEDICINE
Payer: MEDICARE

## 2021-03-01 DIAGNOSIS — Z12.31 ENCOUNTER FOR MAMMOGRAM TO ESTABLISH BASELINE MAMMOGRAM: ICD-10-CM

## 2021-03-01 PROCEDURE — 77063 BREAST TOMOSYNTHESIS BI: CPT

## 2021-03-03 DIAGNOSIS — Z23 NEED FOR VACCINATION: ICD-10-CM

## 2021-03-05 ENCOUNTER — GYNECOLOGY VISIT (OUTPATIENT)
Dept: OBGYN | Facility: CLINIC | Age: 66
End: 2021-03-05
Payer: MEDICARE

## 2021-03-05 VITALS — BODY MASS INDEX: 25.33 KG/M2 | DIASTOLIC BLOOD PRESSURE: 73 MMHG | SYSTOLIC BLOOD PRESSURE: 116 MMHG | WEIGHT: 143 LBS

## 2021-03-05 DIAGNOSIS — Z90.79 HX OF TOTAL HYSTERECTOMY WITH REMOVAL OF BOTH TUBES AND OVARIES: ICD-10-CM

## 2021-03-05 DIAGNOSIS — N95.9 MENOPAUSAL AND POSTMENOPAUSAL DISORDER: ICD-10-CM

## 2021-03-05 DIAGNOSIS — R10.84 GENERALIZED ABDOMINAL PAIN: ICD-10-CM

## 2021-03-05 DIAGNOSIS — Z90.722 HX OF TOTAL HYSTERECTOMY WITH REMOVAL OF BOTH TUBES AND OVARIES: ICD-10-CM

## 2021-03-05 DIAGNOSIS — R10.13 EPIGASTRIC PAIN: ICD-10-CM

## 2021-03-05 DIAGNOSIS — Z90.710 HX OF TOTAL HYSTERECTOMY WITH REMOVAL OF BOTH TUBES AND OVARIES: ICD-10-CM

## 2021-03-05 PROCEDURE — 99213 OFFICE O/P EST LOW 20 MIN: CPT | Performed by: OBSTETRICS & GYNECOLOGY

## 2021-03-05 ASSESSMENT — FIBROSIS 4 INDEX: FIB4 SCORE: 1.64

## 2021-03-05 NOTE — PROGRESS NOTES
Subjective:      Karuna Reid is a 65 y.o. female who presents with Gynecologic Exam (Discuss complications from Hysterectomy)            HPI patient is a 65-year-old who presents today for discussion regarding her previous gynecologic surgery and abnormal symptoms she has been experiencing for the past few years.  Patient had robotic hysterectomy with BSO, vaginal vault suspension posterior colporrhaphy and states her recovery was fine.  She had some menopausal symptoms which have resolved.    When she was seen in our office a year ago, she was experiencing some periumbilical and generalized abdominal pain.  Patient states her generalized abdominal pain has resolved but she gets some intermittent random periumbilical pain and she has had imaging which confirms a small hernia in the periumbilical area.    Patient states she recently had a colonoscopy.  She reports bladder functions are normal.  Denies any pelvic pain or abnormal discharge.    She reports that she gets the sensation of something is floating in her abdomen and she is concerned that mesh was used during her surgery and that there is a free-floating piece of mesh in her abdomen.    Her new pain symptomatology is some random pain sensation lasting a few seconds on the left lateral back and she states she sometimes feels a small lump in that area but symptoms are not present currently.    Patient reports that sometimes she has some stiffness down her left leg starting from her hip and discomfort sometimes travels down to her toes on the left.  This particular symptom is not present currently and is random and sometimes last for a few minutes.    She reports no problems ambulating.    Patient states she is up-to-date with her mammogram and she does self breast exams.  Has no vaginal or pelvic complaints currently.    ROS all organ systems are reviewed and were negative except for complaints in HPI.  Patient has no pain symptomatology at present        Objective:     /73 (BP Location: Right arm, Patient Position: Sitting, BP Cuff Size: Adult)   Wt 64.9 kg (143 lb)   BMI 25.33 kg/m²      Physical Exam  Vitals and nursing note reviewed. Exam conducted with a chaperone present.   Constitutional:       General: She is not in acute distress.     Appearance: Normal appearance. She is normal weight.   Neurological:      Mental Status: She is alert and oriented to person, place, and time.      Coordination: Coordination normal.      Gait: Gait normal.   Psychiatric:         Mood and Affect: Mood normal.         Behavior: Behavior normal.         Thought Content: Thought content normal.         Judgment: Judgment normal.          Discussion:    I reviewed operative report with patient in detail and discussed with patient that there is no mention of any mesh or foreign body beside sutures used during her surgery.  I reassured patient that she did not have any mesh based on this operative report.  I reviewed all her recent pelvic and abdominal imaging that patient has had which has shown no foreign body.  I discussed how hysterectomy is typically done and I reviewed again her procedure she had.  I discussed periumbilical symptoms and discuss periumbilical hernia based on imaging and she states she will follow up with surgery to discuss possible hernia repair.  All questions and concerns were addressed.  I discussed option for physical therapy evaluation for her left leg symptoms but she does not want evaluation at this time.           Assessment/Plan:        1. Generalized abdominal pain  Patient has had generalized abdominal pain which has now resolved.  She has some periumbilical tenderness and imaging shows a small periumbilical hernia.  She states she will follow up with surgery and does not need a referral    2. Epigastric pain  Patient has had epigastric pain which has now resolved    3. Hx of total hysterectomy with removal of both tubes and ovaries  I  reviewed operative report with patient and she was concerned about possible mesh.  There was no mesh used during her surgery and patient was reassured    4. Menopausal and postmenopausal disorder  Patient menopausal symptoms have resolved    5.  Precautions and plan of care were reviewed.  25 minutes was spent with patient today.  All time was for face-to-face counseling.  Patient to follow-up as needed

## 2021-03-05 NOTE — NON-PROVIDER
Pt here to discuss complications from Hysterectomy   (done in 10/2016 by Dr. Cazares)  Phone: [phone]  Pharmacy verified  Pt c/o leg pain and thinks it may be caused by the mesh placed in surgery

## 2021-03-16 ENCOUNTER — TELEPHONE (OUTPATIENT)
Dept: OBGYN | Facility: CLINIC | Age: 66
End: 2021-03-16

## 2021-03-30 ENCOUNTER — GYNECOLOGY VISIT (OUTPATIENT)
Dept: OBGYN | Facility: CLINIC | Age: 66
End: 2021-03-30
Payer: MEDICARE

## 2021-03-30 VITALS
RESPIRATION RATE: 18 BRPM | WEIGHT: 146 LBS | DIASTOLIC BLOOD PRESSURE: 60 MMHG | SYSTOLIC BLOOD PRESSURE: 107 MMHG | BODY MASS INDEX: 25.86 KG/M2 | HEART RATE: 66 BPM

## 2021-03-30 DIAGNOSIS — R10.84 GENERALIZED ABDOMINAL PAIN: ICD-10-CM

## 2021-03-30 DIAGNOSIS — Z90.79 HX OF TOTAL HYSTERECTOMY WITH REMOVAL OF BOTH TUBES AND OVARIES: ICD-10-CM

## 2021-03-30 DIAGNOSIS — Z90.722 HX OF TOTAL HYSTERECTOMY WITH REMOVAL OF BOTH TUBES AND OVARIES: ICD-10-CM

## 2021-03-30 DIAGNOSIS — Z90.710 HX OF TOTAL HYSTERECTOMY WITH REMOVAL OF BOTH TUBES AND OVARIES: ICD-10-CM

## 2021-03-30 PROCEDURE — 99213 OFFICE O/P EST LOW 20 MIN: CPT | Performed by: OBSTETRICS & GYNECOLOGY

## 2021-03-30 ASSESSMENT — FIBROSIS 4 INDEX: FIB4 SCORE: 1.64

## 2021-03-30 NOTE — PROGRESS NOTES
Subjective:      Karuna Reid is a 65 y.o. female who presents for f/u Gynecologic Exam            HPI patient is a 65-year-old who presents today for follow-up evaluation of chronic abdominal pain.  Patient has had history of robotic hysterectomy with BSO but is convinced that mesh was used and that there is some abnormality from the mesh that is causing her her symptoms.  Symptoms are present for the abdomen and lower back/flank and pain symptoms travel and radiates and not consistent.  She states she was having a lot of periumbilical pain for several months after her robotic hysterectomy was about 4 to 5 months after her surgery she felt a snap like a rubber band snapping and periumbilical pain went away.  Patient is still convinced that mesh was used during her surgery and I reassured patient over and over again at previous visit at 38 hours measures not use based on repor.  she sometimes has radiation of pain down her leg I discussed this is musculoskeletal or nerve compression type symptoms she should get evaluated by physical therapy.  Reports no changes in bowel or bladder functions.  She sometimes gets some pain sensation in the lower abdomen with radiation to the pelvic region but symptoms are random.  .        ROS All organ systems were reviewed and are negative except for complaints in HPI       Objective:     Wt 66.2 kg (146 lb)   BMI 25.86 kg/m²  VSS, AF    Physical Exam  Vitals and nursing note reviewed. Exam conducted with a chaperone present.   Constitutional:       General: She is not in acute distress.     Appearance: Normal appearance. She is normal weight. She is not toxic-appearing.   Neurological:      Mental Status: She is alert.   Psychiatric:         Mood and Affect: Mood normal.         Thought Content: Thought content normal.         Judgment: Judgment normal.          Discussion:    Patient brought in her H&P and operative report from previous hysterectomy and I discussed with patient  that we already have this report in the system which I reviewed with patient in great detail at her previous visit.  I discussed operative report which shows no mesh was used although patient is convinced that mesh was used.  Patient wants to do laparoscopy and I discussed laparoscopy and may or may not be able to give her answers for her symptoms because her symptoms seems very complex.  I discussed that if there is scar tissue from her previous surgery this can be addressed removed during laparoscopy and if this was the cause of her symptoms, this may help with her pain symptomatology.  I discussed because of her multiple type of symptoms including radiation of symptoms down her legs, this is likely nerve compression and she may need physical therapy evaluation for this type of symptoms.  I discussed that if no abnormality seen on laparoscopy and referred pain symptomatology does not improve after laparoscopy, that she will need to see other specialties such as surgery and medicine for evaluation.  All questions and concerns were addressed to patient satisfaction.  Insurance authorization was requested today.  Patient will follow-up for preoperative exam.       Assessment/Plan:        1. Generalized abdominal pain  65-year-old presenting to discuss evaluation of chronic radiating pain nonspecific abdominal pain.  Previous robotic hysterectomy report was reviewed with patient.  I discussed extent of laparoscopy and treatment of her symptoms.  Patient would like to proceed with laparoscopy.  Insurance authorization was requested.  Patient follow-up for preoperative exam    - REFERRAL TO OB/GYN SURGERY    2. Hx of total hysterectomy with removal of both tubes and ovaries  History was reviewed  - REFERRAL TO OB/GYN SURGERY    3. precautions and plan of care were reviewed.  25 minutes was spent with patient today- all time was for counseling on plan of care and treatment

## 2021-04-08 ENCOUNTER — TELEPHONE (OUTPATIENT)
Dept: NEUROLOGY | Facility: MEDICAL CENTER | Age: 66
End: 2021-04-08

## 2021-04-09 NOTE — TELEPHONE ENCOUNTER
Tell her that the MRI and PET scans were both normal.  That was why she was never contacted.  I had told her that I would have called her had there been some abnormality that was significant.  We can simply follow-up and talk about what we can do next.  For now I am quite pleased with these results, needless to say.

## 2021-04-09 NOTE — TELEPHONE ENCOUNTER
Patient called leaving a detailed message trying to get the results for her MR and PET SCAN that was done in november 2020 did you want  Patient to come in ?

## 2021-04-27 ENCOUNTER — OFFICE VISIT (OUTPATIENT)
Dept: MEDICAL GROUP | Facility: MEDICAL CENTER | Age: 66
End: 2021-04-27
Payer: MEDICARE

## 2021-04-27 VITALS
TEMPERATURE: 97.4 F | WEIGHT: 144 LBS | SYSTOLIC BLOOD PRESSURE: 112 MMHG | BODY MASS INDEX: 25.52 KG/M2 | DIASTOLIC BLOOD PRESSURE: 60 MMHG | HEART RATE: 69 BPM | HEIGHT: 63 IN | OXYGEN SATURATION: 96 %

## 2021-04-27 DIAGNOSIS — R07.89 STERNAL PAIN: ICD-10-CM

## 2021-04-27 DIAGNOSIS — M79.605 LEFT LEG PAIN: ICD-10-CM

## 2021-04-27 PROCEDURE — 99213 OFFICE O/P EST LOW 20 MIN: CPT | Performed by: FAMILY MEDICINE

## 2021-04-27 ASSESSMENT — FIBROSIS 4 INDEX: FIB4 SCORE: 1.64

## 2021-04-27 NOTE — PROGRESS NOTES
Subjective:     CC: Diagnoses of Sternal pain and Left leg pain were pertinent to this visit.    HPI: Patient is a 65 y.o. female established patient who presents today to discuss the following.       Sternal pain  New problem.   Pain to palpation in the mid sternum.  Present on and off for the past year.  Not present currently.  Mild.   No pain with exertion.  No SOB.   No mass or lump noted.   Pain has been pretty stable over the past year.  Again, only pain with palpation of the area.    Left leg pain  Chronic problem.  Patient remains convinced that mesh fell down in to the left leg.  She has been advised that there was no mesh placed during her surgery.      Past Medical History:   Diagnosis Date   • Arthritis    • GERD (gastroesophageal reflux disease)    • Menopausal and postmenopausal disorder    • Muscle disorder    • Osteoporosis    • Urinary bladder disorder        Social History     Tobacco Use   • Smoking status: Former Smoker     Packs/day: 1.00     Years: 23.00     Pack years: 23.00     Types: Cigarettes     Start date: 1970     Quit date: 1993     Years since quittin.3   • Smokeless tobacco: Never Used   • Tobacco comment: continue to avoid   Substance Use Topics   • Alcohol use: Yes     Alcohol/week: 0.6 oz     Types: 1 Standard drinks or equivalent per week     Comment: 2 per week   • Drug use: No       Current Outpatient Medications Ordered in Epic   Medication Sig Dispense Refill   • Psyllium (METAMUCIL PO) Take  by mouth.     • Cholecalciferol (D3-1000) 1000 UNIT Cap      • Ascorbic Acid (VITAMIN C CR) 1500 MG Tab CR Take 150 mg by mouth.     • vitamin E 200 UNIT capsule Take 200 Units by mouth every day.     • Copper 5 MG Tab Take 1 mg by mouth.     • Zinc 10 MG Lozenge Spray 9 mg in mouth/throat.     • Omega-3 350 MG CAPSULE DELAYED RELEASE Take  by mouth.     • Lutein 10 MG Tab Take  by mouth.     • Multiple Vitamins-Minerals (MULTIVITAMIN ADULT PO) Take  by mouth.     • Biotin  "2500 MCG Cap Take  by mouth.     • calcium carbonate (TUMS) 500 MG Chew Tab Take 500 mg by mouth every day.       No current [facility]-ordered facility-administered medications on file.       Allergies:  Morphine, Other drug, Pollen extract, and Prednisone    Health Maintenance: Completed    ROS:  Pulm: no sob, no cough  CV: no chest pain, no palpitations      Objective:       Exam:  /60 (BP Location: Right arm, Patient Position: Sitting, BP Cuff Size: Adult long)   Pulse 69   Temp 36.3 °C (97.4 °F) (Temporal)   Ht 1.6 m (5' 3\")   Wt 65.3 kg (144 lb)   SpO2 96%   BMI 25.51 kg/m²  Body mass index is 25.51 kg/m².    General: Normal appearing. No distress.  HEAD: NCAT  EYES: conjunctiva clear, lids without ptosis, pupils equal and reactive to light  EARS: ears normal shape and contour.  MOUTH: normal dentition   Neck:  Normal ROM  Pulmonary: Normal effort. Normal respiratory rate.  Cardiovascular: Well perfused. No LE edema  Neurologic: Grossly normal, no focal deficits  Skin: Warm and dry.  No obvious lesions.  Musculoskeletal: Normal gait and station.   Psych: Normal mood and affect. Alert and oriented x3. Judgment and insight is normal.     Labs: 11/2/20 Results reviewed and discussed with the patient, questions answered.    Assessment & Plan:     65 y.o. female with the following -     1. Sternal pain  New problem.  Present for a year, only is tender with palpation of the area, very localized.  No lump or mass noted.  No tenderness to palpation on exam today.  Advised to continue to monitor, likely musculoskeletal.    2. Left leg pain  Chronic problem.  Advised what ever discomfort she is feeling in her left leg is not due to mesh that has migrated down from her abdomen.  We discussed again that there was no mesh placed during her hysterectomy a few years ago.  She is getting laparoscopic surgery with her gynecologist as she has chronic abdominal symptoms that she feels is due to her previous surgery.  " Regarding her leg, I offered physical therapy referral, she declines for now.  States she is still perfectly functional.      No follow-ups on file.    Please note that this dictation was created using voice recognition software. I have made every reasonable attempt to correct obvious errors, but I expect that there are errors of grammar and possibly content that I did not discover before finalizing the note.

## 2021-04-27 NOTE — ASSESSMENT & PLAN NOTE
New problem.   Pain to palpation.   No present currently  Mild.   No pain with exertion  No SOB.   No mass or lump noted.   Present now for over a year. Pretty stable.

## 2021-04-27 NOTE — TELEPHONE ENCOUNTER
ESTABLISHED PATIENT PRE-VISIT PLANNING     Patient was NOT contacted to complete PVP.     Note: Patient will not be contacted if there is no indication to call.     1.  Reviewed notes from the last few office visits within the medical group: Yes    2.  If any orders were placed at last visit or intended to be done for this visit (i.e. 6 mos follow-up), do we have Results/Consult Notes?        •  Labs - Labs were not ordered at last office visit.   Note: If patient appointment is for lab review and patient did not complete labs, check with provider if OK to reschedule patient until labs completed.       •  Imaging - Imaging ordered, completed and results are in chart.       •  Referrals - No referrals were ordered at last office visit.    3. Is this appointment scheduled as a Hospital Follow-Up? No    4.  Immunizations were updated in Epic using WebIZ?: Epic matches WebIZ       •  Web Iz Recommendations: Patient is up to date on all vaccines    5.  Patient is due for the following Health Maintenance Topics:   There are no preventive care reminders to display for this patient.    - Patient has completed Annual Wellness Visit (AWV) and HMR Letter(s) faxed to: n/a    6. Orders for overdue Health Maintenance topics pended in Pre-Charting? N\A    7.  AHA (MDX) form printed for Provider? No, already completed    8.  Patient was NOT informed to arrive 15 min prior to their scheduled appointment and bring in their medication bottles.   Niacinamide Counseling: I recommended taking niacin or niacinamide, also know as vitamin B3, twice daily. Recent evidence suggests that taking vitamin B3 (500 mg twice daily) can reduce the risk of actinic keratoses and non-melanoma skin cancers. Side effects of vitamin B3 include flushing and headache.

## 2021-04-30 ENCOUNTER — HOSPITAL ENCOUNTER (OUTPATIENT)
Dept: RADIOLOGY | Facility: MEDICAL CENTER | Age: 66
End: 2021-04-30
Attending: SURGERY
Payer: MEDICARE

## 2021-04-30 DIAGNOSIS — K59.00 CONSTIPATION, UNSPECIFIED CONSTIPATION TYPE: ICD-10-CM

## 2021-04-30 DIAGNOSIS — K64.9 HEMORRHOIDS WITHOUT COMPLICATION: ICD-10-CM

## 2021-04-30 PROCEDURE — 72195 MRI PELVIS W/O DYE: CPT | Mod: MH

## 2021-05-05 ENCOUNTER — TELEPHONE (OUTPATIENT)
Dept: MEDICAL GROUP | Facility: MEDICAL CENTER | Age: 66
End: 2021-05-05

## 2021-05-05 ENCOUNTER — TELEMEDICINE (OUTPATIENT)
Dept: TELEHEALTH | Facility: TELEMEDICINE | Age: 66
End: 2021-05-05
Payer: MEDICARE

## 2021-05-05 DIAGNOSIS — R11.10 VOMITING AND DIARRHEA: ICD-10-CM

## 2021-05-05 DIAGNOSIS — R53.83 FATIGUE, UNSPECIFIED TYPE: ICD-10-CM

## 2021-05-05 DIAGNOSIS — R19.7 VOMITING AND DIARRHEA: ICD-10-CM

## 2021-05-05 PROCEDURE — 99213 OFFICE O/P EST LOW 20 MIN: CPT | Mod: 95 | Performed by: PHYSICIAN ASSISTANT

## 2021-05-05 NOTE — TELEPHONE ENCOUNTER
1. Caller Name: Karuna Mardon                          Call Back Number: [phone] (home)     2. What are the patient's symptoms (location & severity)? Vomit,fatigue    3. Is this a new symptom Yes    4. When did it start? 5/2/21    5. Action taken per Active Symptom Guide: Urgent Care recommended    6. Patient agrees to recommended action per Active Symptom Escalation Protocol.

## 2021-05-06 ENCOUNTER — HOSPITAL ENCOUNTER (OUTPATIENT)
Dept: LAB | Facility: MEDICAL CENTER | Age: 66
End: 2021-05-06
Attending: PHYSICIAN ASSISTANT
Payer: MEDICARE

## 2021-05-06 ENCOUNTER — PATIENT MESSAGE (OUTPATIENT)
Dept: MEDICAL GROUP | Facility: MEDICAL CENTER | Age: 66
End: 2021-05-06

## 2021-05-06 LAB
COVID ORDER STATUS COVID19: NORMAL
SARS-COV-2 RNA RESP QL NAA+PROBE: NOTDETECTED
SPECIMEN SOURCE: NORMAL

## 2021-05-06 PROCEDURE — C9803 HOPD COVID-19 SPEC COLLECT: HCPCS

## 2021-05-06 PROCEDURE — U0005 INFEC AGEN DETEC AMPLI PROBE: HCPCS

## 2021-05-06 PROCEDURE — U0003 INFECTIOUS AGENT DETECTION BY NUCLEIC ACID (DNA OR RNA); SEVERE ACUTE RESPIRATORY SYNDROME CORONAVIRUS 2 (SARS-COV-2) (CORONAVIRUS DISEASE [COVID-19]), AMPLIFIED PROBE TECHNIQUE, MAKING USE OF HIGH THROUGHPUT TECHNOLOGIES AS DESCRIBED BY CMS-2020-01-R: HCPCS

## 2021-05-06 NOTE — PROGRESS NOTES
Virtual Visit: Established Patient   This visit was conducted via Zoom using secure and encrypted videoconferencing technology. The patient was in a private location in the state of [place].    The patient's identity was confirmed and verbal consent was obtained for this virtual visit.    Subjective:   CC: No chief complaint on file.      Karuna Reid is a 65 y.o. female presenting for evaluation and management of:    COVID test request. She reports feeling fatigue, nausea, vomiting and diarrhea since 3 days ago. She has fatigue but her other symptoms have resolved. No known exposures. She is worried because she has family coming in town in a few days. She denies cough or chest pain. She denies shortness of breath that is abnormal for her.    ROS   Denies any recent fevers or chills. No nausea or vomiting. No chest pains or shortness of breath.     Allergies   Allergen Reactions   • Morphine Diarrhea   • Other Drug Rash     Steroids, caused redness and rash   • Pollen Extract    • Prednisone        Current medicines (including changes today)  Current Outpatient Medications   Medication Sig Dispense Refill   • Psyllium (METAMUCIL PO) Take  by mouth.     • Cholecalciferol (D3-1000) 1000 UNIT Cap      • Ascorbic Acid (VITAMIN C CR) 1500 MG Tab CR Take 150 mg by mouth.     • vitamin E 200 UNIT capsule Take 200 Units by mouth every day.     • Copper 5 MG Tab Take 1 mg by mouth.     • Zinc 10 MG Lozenge Spray 9 mg in mouth/throat.     • Omega-3 350 MG CAPSULE DELAYED RELEASE Take  by mouth.     • Lutein 10 MG Tab Take  by mouth.     • Multiple Vitamins-Minerals (MULTIVITAMIN ADULT PO) Take  by mouth.     • Biotin 2500 MCG Cap Take  by mouth.     • calcium carbonate (TUMS) 500 MG Chew Tab Take 500 mg by mouth every day.       No current facility-administered medications for this visit.       Patient Active Problem List    Diagnosis Date Noted   • Sternal pain 04/27/2021   • Left leg pain 04/27/2021   • Hx of total  hysterectomy with removal of both tubes and ovaries 03/05/2021   • Epigastric pain 01/22/2021   • History of hysterectomy 01/12/2021   • Generalized abdominal pain 09/17/2020   • Memory loss 09/15/2020   • Acute pain of right knee 09/15/2020   • Osteoporosis 06/20/2018   • Carpal tunnel syndrome 08/14/2014   • Menopausal and postmenopausal disorder        Family History   Problem Relation Age of Onset   • Psychiatric Illness Mother         Alzheimer's   • Cancer Maternal Aunt         breast cancer       She  has a past medical history of Arthritis, GERD (gastroesophageal reflux disease), Menopausal and postmenopausal disorder, Muscle disorder, Osteoporosis, and Urinary bladder disorder. She also has no past medical history of Addisons disease (HCC), Adrenal disorder (HCC), Allergy, Anemia, Anxiety, Arrhythmia, Asthma, Blood transfusion without reported diagnosis, Cancer (HCC), Cataract, CHF (congestive heart failure) ([facility]), Clotting disorder (HCC), COPD (chronic obstructive pulmonary disease) (HCC), Cushings syndrome ([facility]), Depression, Diabetes ([facility]), Diabetic neuropathy (HCC), Glaucoma, Goiter, Head ache, Heart attack ([facility]), Heart murmur, HIV (human immunodeficiency virus infection) ([facility]), Hyperlipidemia, Hypertension, IBD (inflammatory bowel disease), Kidney disease, Meningitis, Migraine, Parathyroid disorder (HCC), Pituitary disease (HCC), Pulmonary emphysema (HCC), Seizure (HCC), Sickle cell disease (HCC), Stroke (HCC), Substance abuse (HCC), Thyroid disease, Tuberculosis, or Urinary tract infection.  She  has a past surgical history that includes tonsillectomy (1959); other surgical procedure (1989); tonsillectomy (4/18/2014); cystoscopy (10/19/2016); hysterectomy robotic (10/19/2016); anterior and posterior repair (10/19/2016); vaginal suspension (10/19/2016); and mass excision ortho (Left, 7/17/2020).       Objective:   There were no vitals taken for this visit.    Physical Exam:  Constitutional: Alert, no  distress, well-groomed.  Skin: No rashes in visible areas.  Eye: Round. Conjunctiva clear, lids normal. No icterus.   ENMT: Lips pink without lesions, good dentition, moist mucous membranes. Phonation normal.  Neck: No masses, no thyromegaly. Moves freely without pain.  Respiratory: Unlabored respiratory effort, no cough or audible wheeze  Psych: Alert and oriented x3, normal affect and mood.       Assessment and Plan:   The following treatment plan was discussed:     1. Fatigue, unspecified type  - SARS-CoV-2 PCR (24 hour In-House): Collect NP swab in VTM; Future    2. Vomiting and diarrhea  - SARS-CoV-2 PCR (24 hour In-House): Collect NP swab in VTM; Future      COVID test pending.  Isolation guidelines, conservative measures and ER precautions discussed.     Differential diagnoses, Supportive care, and indications for immediate follow-up discussed with patient.   Pathogenesis of diagnosis discussed including typical length and natural progression.   Instructed to return to clinic or nearest emergency department for any change in condition, further concerns, or worsening of symptoms.    The patient demonstrated a good understanding and agreed with the treatment plan.    Follow-up: No follow-ups on file.    Perla Kiser P.A.-C.

## 2021-05-14 DIAGNOSIS — M79.605 LEFT LEG PAIN: ICD-10-CM

## 2021-05-20 ENCOUNTER — GYNECOLOGY VISIT (OUTPATIENT)
Dept: OBGYN | Facility: CLINIC | Age: 66
End: 2021-05-20
Payer: MEDICARE

## 2021-05-20 VITALS — WEIGHT: 142 LBS | BODY MASS INDEX: 25.15 KG/M2 | SYSTOLIC BLOOD PRESSURE: 116 MMHG | DIASTOLIC BLOOD PRESSURE: 63 MMHG

## 2021-05-20 DIAGNOSIS — Z90.79 HX OF TOTAL HYSTERECTOMY WITH REMOVAL OF BOTH TUBES AND OVARIES: ICD-10-CM

## 2021-05-20 DIAGNOSIS — Z90.722 HX OF TOTAL HYSTERECTOMY WITH REMOVAL OF BOTH TUBES AND OVARIES: ICD-10-CM

## 2021-05-20 DIAGNOSIS — Z90.710 HX OF TOTAL HYSTERECTOMY WITH REMOVAL OF BOTH TUBES AND OVARIES: ICD-10-CM

## 2021-05-20 DIAGNOSIS — N81.89 PELVIC FLOOR RELAXATION: ICD-10-CM

## 2021-05-20 DIAGNOSIS — R10.84 GENERALIZED ABDOMINAL PAIN: ICD-10-CM

## 2021-05-20 PROCEDURE — 99213 OFFICE O/P EST LOW 20 MIN: CPT | Performed by: OBSTETRICS & GYNECOLOGY

## 2021-05-20 ASSESSMENT — FIBROSIS 4 INDEX: FIB4 SCORE: 1.64

## 2021-05-20 NOTE — PROGRESS NOTES
Subjective:      Karuna Reid is a 65 y.o. female who presents for Gynecologic Exam (Pre-Op)            HPI patient is a 65-year-old postmenopausal female who presents today for follow-up and to discuss laparoscopy to evaluate abdominal pain/lower pelvic and abdominal pain that she has experienced since she has had a robotic hysterectomy with BSO.  Patient believes firmly that mesh was used during her surgery which is the cause of all her discomfort she has experienced since her surgery.  Her surgery was done by another provider.  She gets a lot of bowel type complaints and gets random episodes of abdominal pain.  States she was bloated before and her abdominal wall was bigger but those symptoms have mostly resolved.  She still gets some random abdominal pain that sometimes radiates across her upper, mid, or lower abdomen.  She recently saw Dr. Selena Adames who ordered some studies which shows pelvic relaxation and some anorectal relaxation with possibe prolapse.  Patient brought a copy of her report which is scanned into the computer.  I discussed pelvic relaxation and treatment options including pelvic floor therapy and possible biofeedback.  I discussed that if she needs anorectal correction and this can be done by Dr. Adames or other general surgeon.  Patient states she has a follow-up with Dr. Adames soon.  Patient has question whether her pelvic relaxation is due to her hysterectomy and I discussed that this is not a cause of pelvic relaxation but most of the time it is age-related changes and weakness of the muscles that support the pelvic floor.  I had discussed with patient on multiple occasions and possible causes of her symptoms including scar tissue and I discussed that the only way I can evaluate this is by laparoscopy and she would like to proceed with laparoscopy.  Reason for her hysterectomy was prolapse of pelvic organs.  Patient states she was told that she has three small hernias of the  abdominal wall.    ROS all organ systems were reviewed and were negative except for complaints in HPI    Past Medical History:   Diagnosis Date   • Arthritis    • GERD (gastroesophageal reflux disease)    • Menopausal and postmenopausal disorder    • Muscle disorder    • Osteoporosis    • Urinary bladder disorder      Past Surgical History:   Procedure Laterality Date   • MASS EXCISION ORTHO Left 2020    Procedure: EXCISION, MASS-FOOT soft tissue TUMOR;  Surgeon: Lenny Silverman D.P.M.;  Location: SURGERY SAME DAY [facility];  Service: Podiatry   • CYSTOSCOPY  10/19/2016    Procedure: CYSTOSCOPY;  Surgeon: Lenny Cazares M.D.;  Location: SURGERY [facility];  Service:    • HYSTERECTOMY ROBOTIC  10/19/2016    Procedure: HYSTERECTOMY ROBOTIC Total with BILATERAL SALPINGO-OOPHORECTOMY;  Surgeon: Lenny Cazares M.D.;  Location: SURGERY [facility];  Service:    • ANTERIOR AND POSTERIOR REPAIR  10/19/2016    Procedure: POSTERIOR REPAIR;  Surgeon: Lenny Cazares M.D.;  Location: SURGERY [facility];  Service:    • VAGINAL SUSPENSION  10/19/2016    Procedure: VAGINAL SUSPENSION Uterosacral, and  McCalls Culdoplasty;  Surgeon: Lenny Cazares M.D.;  Location: SURGERY [facility];  Service:    • TONSILLECTOMY  2014    Performed by Nirali Dos Santos M.D. at [facility]   • OTHER SURGICAL PROCEDURE      Dr Doss; Neck superficial exploration   • TONSILLECTOMY       OB History    Para Term  AB Living   4 1 1 0 3 1   SAB TAB Ectopic Molar Multiple Live Births   2 0 0 0 0 1     Social History     Socioeconomic History   • Marital status:      Spouse name: Not on file   • Number of children: Not on file   • Years of education: Not on file   • Highest education level: 12th grade   Occupational History   • Not on file   Tobacco Use   • Smoking status: Former Smoker     Packs/day: 1.00     Years: 23.00     Pack years: 23.00     Types: Cigarettes     Start  date: 1970     Quit date: 1993     Years since quittin.4   • Smokeless tobacco: Never Used   • Tobacco comment: continue to avoid   Vaping Use   • Vaping Use: Never used   Substance and Sexual Activity   • Alcohol use: Yes     Alcohol/week: 0.6 oz     Types: 1 Standard drinks or equivalent per week     Comment: 2 per week   • Drug use: No   • Sexual activity: Never     Partners: Male     Comment: , one daughter,  (now retired).   Other Topics Concern   • Not on file   Social History Narrative   • Not on file     Social Determinants of Health     Financial Resource Strain: Low Risk    • Difficulty of Paying Living Expenses: Not hard at all   Food Insecurity: No Food Insecurity   • Worried About Running Out of Food in the Last Year: Never true   • Ran Out of Food in the Last Year: Never true   Transportation Needs: No Transportation Needs   • Lack of Transportation (Medical): No   • Lack of Transportation (Non-Medical): No   Physical Activity: Inactive   • Days of Exercise per Week: 0 days   • Minutes of Exercise per Session: 0 min   Stress: Stress Concern Present   • Feeling of Stress : To some extent   Social Connections: Socially Integrated   • Frequency of Communication with Friends and Family: More than three times a week   • Frequency of Social Gatherings with Friends and Family: Once a week   • Attends Buddhist Services: More than 4 times per year   • Active Member of Clubs or Organizations: Yes   • Attends Club or Organization Meetings: More than 4 times per year   • Marital Status:    Intimate Partner Violence:    • Fear of Current or Ex-Partner:    • Emotionally Abused:    • Physically Abused:    • Sexually Abused:      Family History   Problem Relation Age of Onset   • Psychiatric Illness Mother         Alzheimer's   • Cancer Maternal Aunt         breast cancer       Current Outpatient Medications:   •  Docusate Sodium (COLACE PO), Take  by mouth., Disp: , Rfl:    •  Psyllium (METAMUCIL PO), Take  by mouth., Disp: , Rfl:   •  Cholecalciferol (D3-1000) 1000 UNIT Cap, , Disp: , Rfl:   •  Ascorbic Acid (VITAMIN C CR) 1500 MG Tab CR, Take 150 mg by mouth., Disp: , Rfl:   •  vitamin E 200 UNIT capsule, Take 200 Units by mouth every day., Disp: , Rfl:   •  Copper 5 MG Tab, Take 1 mg by mouth., Disp: , Rfl:   •  Zinc 10 MG Lozenge, Spray 9 mg in mouth/throat., Disp: , Rfl:   •  Omega-3 350 MG CAPSULE DELAYED RELEASE, Take  by mouth., Disp: , Rfl:   •  Lutein 10 MG Tab, Take  by mouth., Disp: , Rfl:   •  Multiple Vitamins-Minerals (MULTIVITAMIN ADULT PO), Take  by mouth., Disp: , Rfl:   •  Biotin 2500 MCG Cap, Take  by mouth., Disp: , Rfl:   •  calcium carbonate (TUMS) 500 MG Chew Tab, Take 500 mg by mouth every day., Disp: , Rfl:     Allergies   Allergen Reactions   • Morphine Diarrhea   • Other Drug Rash     Steroids, caused redness and rash   • Pollen Extract    • Prednisone           Objective:     /63 (BP Location: Left arm, Patient Position: Sitting, BP Cuff Size: Adult)   Wt 64.4 kg (142 lb)   BMI 25.15 kg/m²      Physical Exam  Vitals and nursing note reviewed. Exam conducted with a chaperone present.   Constitutional:       General: She is not in acute distress.     Appearance: Normal appearance. She is not toxic-appearing.   HENT:      Head: Normocephalic and atraumatic.   Eyes:      General: No scleral icterus.        Right eye: No discharge.         Left eye: No discharge.      Conjunctiva/sclera: Conjunctivae normal.   Cardiovascular:      Rate and Rhythm: Normal rate and regular rhythm.      Pulses: Normal pulses.      Heart sounds: Normal heart sounds. No murmur heard.   No gallop.    Pulmonary:      Effort: Pulmonary effort is normal. No respiratory distress.      Breath sounds: Normal breath sounds. No wheezing.   Abdominal:      General: Abdomen is flat. Bowel sounds are normal. There is no distension.      Palpations: Abdomen is soft.      Tenderness:  There is no abdominal tenderness.   Musculoskeletal:         General: Normal range of motion.      Cervical back: Normal range of motion and neck supple. No rigidity.      Right lower leg: No edema.      Left lower leg: No edema.   Lymphadenopathy:      Cervical: No cervical adenopathy.   Skin:     General: Skin is warm and dry.      Findings: No bruising or lesion.   Neurological:      General: No focal deficit present.      Mental Status: She is alert and oriented to person, place, and time.      Gait: Gait normal.   Psychiatric:         Mood and Affect: Mood normal.         Thought Content: Thought content normal.         Judgment: Judgment normal.          Discussion:    Complete discussion regarding the proposed procedure was conducted both today and throughout the entire april-operative period. The procedures: Diagnostic laparoscopy, possible operative laparoscopy and any indicated procedures  Were specifically addressed. There is increased risk from  any surgical procedure related to  Anesthesia, increased risk of infection, both abdominal and wound infections as well as heavy bleeding, both during surgery, or delayed bleeding. These were discussed as well.        Specific to Laparoscopic surgery , patient made aware of increased risk of trocoar injuries to the intestine, major /minor blood vessels and bladder.Patient is also made aware of the slight increase risk to those organs mention from thermal ( burn) injuries. Patient is aware that the small operating arena of this type surgery may make immediate diagnosis of these injuries difficult. Thus the patient acknowledges both immediate and delayed diagnosis of these  complications from this type of surgery are possible, albeit rare.                                                                                                    Specific to Abdominal surgery ( possible open), patient is made aware of risk of injury to the bowel, bladder, nerves and the  ureter ( urine conduit). Complications to these organs are rare, but do occur and indeed it was discussed that the specific pathology of the patient that necessitated surgery may make the risk greater. Patient additionally is aware of the risk of subsequent adhesions or small bowel obstruction from open abdominal surgery.  Alternatives to surgery including observation were discussed and declined and patient desires to proceed with surgery.  Preoperative instructions were discussed.  I reviewed medication and I discussed fasting for 7 hours prior to surgery.  Patient is on iron supplementation and I discussed that she can resume supplementation after surgery.  Postoperative care, restrictions and follow-up plan were reviewed.  Informed consent for surgery was obtained today                  Assessment/Plan:        1. Generalized abdominal pain  65-year-old with intermittent abdominal pain since history of robotic hysterectomy.  Patient desires laparoscopy to evaluate pain.  Patient counseled on surgery.  Plan procedures are diagnostic laparoscopy, possible operative laparoscopy and any indicated procedures.  I counseled patient on surgery and alternatives to surgery and she desires to proceed with surgery.  Preoperative instructions were reviewed.  Postoperative care and management was discussed  - Consent for all Surgical, Special Diagnostic or Therapeutic Procedures    2. Hx of total hysterectomy with removal of both tubes and ovaries  Patient had history of hysterectomy for pelvic organ prolapse.  Patient is convinced that mesh was used during surgery and mesh as a cause of her symptoms.  I reviewed operative report on multiple occasions with patient showing that no mesh was used for her surgery.  - Consent for all Surgical, Special Diagnostic or Therapeutic Procedures    3. Pelvic floor relaxation  We discussed pelvic floor relaxation and treatment options.  Patient states she will check with Dr. Selena Adames  and come up with a plan.  If she desires referral for pelvic floor therapy, she will let us know for us to place order.    4.  Precautions and plan of care were reviewed.  25 minutes of today's visit was spent for direct face-to-face counseling regarding treatment plan.  Patient to follow-up 1 to 2 weeks postop.

## 2021-05-26 NOTE — TELEPHONE ENCOUNTER
Pt called requesting copy of surgery consent form. I informed pt that I would print out a copy and place it at the . Pt agreed to picking up consent. Pt also stated she would like a referral for pelvic floor physical therapy. I informed pt that  was not in the clinic, but I would send a staff message to him, and he would send referral if needed. I informed pt that if referral was sent they would call her to get an appt scheduled.  Pt agreed to plan, and had no further questions or concerns.

## 2021-05-27 DIAGNOSIS — N81.89 PELVIC FLOOR RELAXATION: ICD-10-CM

## 2021-06-03 ENCOUNTER — PRE-ADMISSION TESTING (OUTPATIENT)
Dept: ADMISSIONS | Facility: MEDICAL CENTER | Age: 66
End: 2021-06-03
Attending: OBSTETRICS & GYNECOLOGY
Payer: MEDICARE

## 2021-06-03 DIAGNOSIS — Z01.810 PRE-OPERATIVE CARDIOVASCULAR EXAMINATION: ICD-10-CM

## 2021-06-03 DIAGNOSIS — Z01.812 PRE-OPERATIVE LABORATORY EXAMINATION: ICD-10-CM

## 2021-06-03 LAB
ANION GAP SERPL CALC-SCNC: 7 MMOL/L (ref 7–16)
BUN SERPL-MCNC: 15 MG/DL (ref 8–22)
CALCIUM SERPL-MCNC: 10.1 MG/DL (ref 8.5–10.5)
CHLORIDE SERPL-SCNC: 105 MMOL/L (ref 96–112)
CO2 SERPL-SCNC: 29 MMOL/L (ref 20–33)
CREAT SERPL-MCNC: 0.76 MG/DL (ref 0.5–1.4)
EKG IMPRESSION: NORMAL
ERYTHROCYTE [DISTWIDTH] IN BLOOD BY AUTOMATED COUNT: 44.5 FL (ref 35.9–50)
GLUCOSE SERPL-MCNC: 94 MG/DL (ref 65–99)
HCT VFR BLD AUTO: 42.3 % (ref 37–47)
HGB BLD-MCNC: 13.6 G/DL (ref 12–16)
MCH RBC QN AUTO: 29.9 PG (ref 27–33)
MCHC RBC AUTO-ENTMCNC: 32.2 G/DL (ref 33.6–35)
MCV RBC AUTO: 93 FL (ref 81.4–97.8)
PLATELET # BLD AUTO: 161 K/UL (ref 164–446)
PMV BLD AUTO: 11.3 FL (ref 9–12.9)
POTASSIUM SERPL-SCNC: 4.4 MMOL/L (ref 3.6–5.5)
RBC # BLD AUTO: 4.55 M/UL (ref 4.2–5.4)
SODIUM SERPL-SCNC: 141 MMOL/L (ref 135–145)
WBC # BLD AUTO: 5.7 K/UL (ref 4.8–10.8)

## 2021-06-03 PROCEDURE — 93010 ELECTROCARDIOGRAM REPORT: CPT | Performed by: INTERNAL MEDICINE

## 2021-06-03 PROCEDURE — 85027 COMPLETE CBC AUTOMATED: CPT

## 2021-06-03 PROCEDURE — 36415 COLL VENOUS BLD VENIPUNCTURE: CPT

## 2021-06-03 PROCEDURE — 80048 BASIC METABOLIC PNL TOTAL CA: CPT

## 2021-06-03 PROCEDURE — 93005 ELECTROCARDIOGRAM TRACING: CPT

## 2021-06-03 RX ORDER — CYANOCOBALAMIN (VITAMIN B-12) 500 MCG
500 TABLET ORAL DAILY
COMMUNITY

## 2021-06-03 ASSESSMENT — FIBROSIS 4 INDEX: FIB4 SCORE: 1.64

## 2021-06-08 ENCOUNTER — PATIENT OUTREACH (OUTPATIENT)
Dept: HEALTH INFORMATION MANAGEMENT | Facility: OTHER | Age: 66
End: 2021-06-08

## 2021-06-08 NOTE — PROGRESS NOTES
Outcome: Pt called wanting to confirm referral for Nephrology. Pt wanted to confirm appt on 07/02. Adv to pt appt on 07/02 is scheduled for Physical Therapy. Pt was confused because she was told by Dr. Rosenberg she would be seeing Nephrology. Adv to pt that there is only two referrals for Physical Therapy. Pt stated she will call office to see what is going on.   Please transfer to Patient Outreach Team at [phone] when patient returns call.

## 2021-06-10 ENCOUNTER — PRE-ADMISSION TESTING (OUTPATIENT)
Dept: ADMISSIONS | Facility: MEDICAL CENTER | Age: 66
End: 2021-06-10
Attending: OBSTETRICS & GYNECOLOGY
Payer: MEDICARE

## 2021-06-10 DIAGNOSIS — Z01.812 PRE-OPERATIVE LABORATORY EXAMINATION: ICD-10-CM

## 2021-06-10 PROCEDURE — U0003 INFECTIOUS AGENT DETECTION BY NUCLEIC ACID (DNA OR RNA); SEVERE ACUTE RESPIRATORY SYNDROME CORONAVIRUS 2 (SARS-COV-2) (CORONAVIRUS DISEASE [COVID-19]), AMPLIFIED PROBE TECHNIQUE, MAKING USE OF HIGH THROUGHPUT TECHNOLOGIES AS DESCRIBED BY CMS-2020-01-R: HCPCS

## 2021-06-10 PROCEDURE — U0005 INFEC AGEN DETEC AMPLI PROBE: HCPCS

## 2021-06-10 PROCEDURE — C9803 HOPD COVID-19 SPEC COLLECT: HCPCS

## 2021-06-14 NOTE — PROGRESS NOTES
Tuesday, July 13, 2021  10:00 am (1 hour)    Dr. Kyler Schulte - [address] [place] [address]

## 2021-06-15 ENCOUNTER — ANESTHESIA EVENT (OUTPATIENT)
Dept: SURGERY | Facility: MEDICAL CENTER | Age: 66
End: 2021-06-15
Payer: MEDICARE

## 2021-06-15 NOTE — OR NURSING
COVID-19 Pre-surgery screenin. Do you have an undiagnosed respiratory illness or symptoms such as coughing or sneezing? No (Yes/No)    2. Do you have an unexplained fever greater than 100.4 degrees Fahrenheit or 38 degrees Celsius?     No (Yes/No)    3. Have you had direct exposure to a patient who tested positive for Covid-19?    No (Yes/No)    4. Have you had any loss of your sense of taste or smell? Have you had N/V or sore throat? No    Patient has been informed of visitor policy and asked to wear a mask upon entering the hospital   Yes (Yes/No)

## 2021-06-16 ENCOUNTER — ANESTHESIA (OUTPATIENT)
Dept: SURGERY | Facility: MEDICAL CENTER | Age: 66
End: 2021-06-16
Payer: MEDICARE

## 2021-06-16 ENCOUNTER — HOSPITAL ENCOUNTER (OUTPATIENT)
Facility: MEDICAL CENTER | Age: 66
End: 2021-06-16
Attending: OBSTETRICS & GYNECOLOGY | Admitting: OBSTETRICS & GYNECOLOGY
Payer: MEDICARE

## 2021-06-16 VITALS
DIASTOLIC BLOOD PRESSURE: 56 MMHG | TEMPERATURE: 97.7 F | RESPIRATION RATE: 18 BRPM | OXYGEN SATURATION: 95 % | BODY MASS INDEX: 24.46 KG/M2 | WEIGHT: 143.3 LBS | SYSTOLIC BLOOD PRESSURE: 91 MMHG | HEIGHT: 64 IN | HEART RATE: 61 BPM

## 2021-06-16 DIAGNOSIS — Z98.890 S/P LAPAROSCOPY: ICD-10-CM

## 2021-06-16 PROCEDURE — 49320 DIAG LAPARO SEPARATE PROC: CPT | Performed by: OBSTETRICS & GYNECOLOGY

## 2021-06-16 PROCEDURE — 49320 DIAG LAPARO SEPARATE PROC: CPT | Mod: 80 | Performed by: OBSTETRICS & GYNECOLOGY

## 2021-06-16 PROCEDURE — A9270 NON-COVERED ITEM OR SERVICE: HCPCS | Performed by: OBSTETRICS & GYNECOLOGY

## 2021-06-16 PROCEDURE — 700101 HCHG RX REV CODE 250: Performed by: ANESTHESIOLOGY

## 2021-06-16 PROCEDURE — 700105 HCHG RX REV CODE 258: Performed by: OBSTETRICS & GYNECOLOGY

## 2021-06-16 PROCEDURE — 160025 RECOVERY II MINUTES (STATS): Performed by: OBSTETRICS & GYNECOLOGY

## 2021-06-16 PROCEDURE — A9270 NON-COVERED ITEM OR SERVICE: HCPCS | Performed by: ANESTHESIOLOGY

## 2021-06-16 PROCEDURE — 160048 HCHG OR STATISTICAL LEVEL 1-5: Performed by: OBSTETRICS & GYNECOLOGY

## 2021-06-16 PROCEDURE — 160002 HCHG RECOVERY MINUTES (STAT): Performed by: OBSTETRICS & GYNECOLOGY

## 2021-06-16 PROCEDURE — 160029 HCHG SURGERY MINUTES - 1ST 30 MINS LEVEL 4: Performed by: OBSTETRICS & GYNECOLOGY

## 2021-06-16 PROCEDURE — 160041 HCHG SURGERY MINUTES - EA ADDL 1 MIN LEVEL 4: Performed by: OBSTETRICS & GYNECOLOGY

## 2021-06-16 PROCEDURE — 160047 HCHG PACU  - EA ADDL 30 MINS PHASE II: Performed by: OBSTETRICS & GYNECOLOGY

## 2021-06-16 PROCEDURE — 160035 HCHG PACU - 1ST 60 MINS PHASE I: Performed by: OBSTETRICS & GYNECOLOGY

## 2021-06-16 PROCEDURE — 500868 HCHG NEEDLE, SURGI(VARES): Performed by: OBSTETRICS & GYNECOLOGY

## 2021-06-16 PROCEDURE — 700111 HCHG RX REV CODE 636 W/ 250 OVERRIDE (IP): Performed by: ANESTHESIOLOGY

## 2021-06-16 PROCEDURE — 700102 HCHG RX REV CODE 250 W/ 637 OVERRIDE(OP): Performed by: ANESTHESIOLOGY

## 2021-06-16 PROCEDURE — 502703 HCHG DEVICE, LIGASURE V SEALER: Performed by: OBSTETRICS & GYNECOLOGY

## 2021-06-16 PROCEDURE — 160046 HCHG PACU - 1ST 60 MINS PHASE II: Performed by: OBSTETRICS & GYNECOLOGY

## 2021-06-16 PROCEDURE — 501581 HCHG TROCAR: Performed by: OBSTETRICS & GYNECOLOGY

## 2021-06-16 PROCEDURE — 500886 HCHG PACK, LAPAROSCOPY: Performed by: OBSTETRICS & GYNECOLOGY

## 2021-06-16 PROCEDURE — 700101 HCHG RX REV CODE 250: Performed by: OBSTETRICS & GYNECOLOGY

## 2021-06-16 PROCEDURE — 500002 HCHG ADHESIVE, DERMABOND: Performed by: OBSTETRICS & GYNECOLOGY

## 2021-06-16 PROCEDURE — 501838 HCHG SUTURE GENERAL: Performed by: OBSTETRICS & GYNECOLOGY

## 2021-06-16 PROCEDURE — 160009 HCHG ANES TIME/MIN: Performed by: OBSTETRICS & GYNECOLOGY

## 2021-06-16 PROCEDURE — 501582 HCHG TROCAR, THRD BLADED: Performed by: OBSTETRICS & GYNECOLOGY

## 2021-06-16 RX ORDER — ONDANSETRON 2 MG/ML
INJECTION INTRAMUSCULAR; INTRAVENOUS PRN
Status: DISCONTINUED | OUTPATIENT
Start: 2021-06-16 | End: 2021-06-16 | Stop reason: SURG

## 2021-06-16 RX ORDER — MEPERIDINE HYDROCHLORIDE 25 MG/ML
6.25 INJECTION INTRAMUSCULAR; INTRAVENOUS; SUBCUTANEOUS
Status: DISCONTINUED | OUTPATIENT
Start: 2021-06-16 | End: 2021-06-16 | Stop reason: HOSPADM

## 2021-06-16 RX ORDER — SODIUM CHLORIDE, SODIUM LACTATE, POTASSIUM CHLORIDE, CALCIUM CHLORIDE 600; 310; 30; 20 MG/100ML; MG/100ML; MG/100ML; MG/100ML
INJECTION, SOLUTION INTRAVENOUS CONTINUOUS
Status: ACTIVE | OUTPATIENT
Start: 2021-06-16 | End: 2021-06-16

## 2021-06-16 RX ORDER — DIPHENHYDRAMINE HYDROCHLORIDE 50 MG/ML
12.5 INJECTION INTRAMUSCULAR; INTRAVENOUS
Status: DISCONTINUED | OUTPATIENT
Start: 2021-06-16 | End: 2021-06-16 | Stop reason: HOSPADM

## 2021-06-16 RX ORDER — PHENYLEPHRINE HCL IN 0.9% NACL 0.5 MG/5ML
SYRINGE (ML) INTRAVENOUS PRN
Status: DISCONTINUED | OUTPATIENT
Start: 2021-06-16 | End: 2021-06-16 | Stop reason: SURG

## 2021-06-16 RX ORDER — KETOROLAC TROMETHAMINE 30 MG/ML
INJECTION, SOLUTION INTRAMUSCULAR; INTRAVENOUS PRN
Status: DISCONTINUED | OUTPATIENT
Start: 2021-06-16 | End: 2021-06-16 | Stop reason: SURG

## 2021-06-16 RX ORDER — ONDANSETRON 2 MG/ML
4 INJECTION INTRAMUSCULAR; INTRAVENOUS
Status: DISCONTINUED | OUTPATIENT
Start: 2021-06-16 | End: 2021-06-16 | Stop reason: HOSPADM

## 2021-06-16 RX ORDER — LIDOCAINE HYDROCHLORIDE 20 MG/ML
INJECTION, SOLUTION EPIDURAL; INFILTRATION; INTRACAUDAL; PERINEURAL PRN
Status: DISCONTINUED | OUTPATIENT
Start: 2021-06-16 | End: 2021-06-16 | Stop reason: SURG

## 2021-06-16 RX ORDER — MIDAZOLAM HYDROCHLORIDE 1 MG/ML
INJECTION INTRAMUSCULAR; INTRAVENOUS PRN
Status: DISCONTINUED | OUTPATIENT
Start: 2021-06-16 | End: 2021-06-16 | Stop reason: SURG

## 2021-06-16 RX ORDER — METOCLOPRAMIDE HYDROCHLORIDE 5 MG/ML
INJECTION INTRAMUSCULAR; INTRAVENOUS PRN
Status: DISCONTINUED | OUTPATIENT
Start: 2021-06-16 | End: 2021-06-16 | Stop reason: SURG

## 2021-06-16 RX ORDER — ROCURONIUM BROMIDE 10 MG/ML
INJECTION, SOLUTION INTRAVENOUS PRN
Status: DISCONTINUED | OUTPATIENT
Start: 2021-06-16 | End: 2021-06-16 | Stop reason: SURG

## 2021-06-16 RX ORDER — OXYCODONE HCL 5 MG/5 ML
5 SOLUTION, ORAL ORAL
Status: DISCONTINUED | OUTPATIENT
Start: 2021-06-16 | End: 2021-06-16 | Stop reason: HOSPADM

## 2021-06-16 RX ORDER — DIPHENHYDRAMINE HYDROCHLORIDE 50 MG/ML
INJECTION INTRAMUSCULAR; INTRAVENOUS PRN
Status: DISCONTINUED | OUTPATIENT
Start: 2021-06-16 | End: 2021-06-16 | Stop reason: SURG

## 2021-06-16 RX ORDER — BUPIVACAINE HYDROCHLORIDE AND EPINEPHRINE 2.5; 5 MG/ML; UG/ML
INJECTION, SOLUTION EPIDURAL; INFILTRATION; INTRACAUDAL; PERINEURAL
Status: DISCONTINUED | OUTPATIENT
Start: 2021-06-16 | End: 2021-06-16 | Stop reason: HOSPADM

## 2021-06-16 RX ORDER — SIMETHICONE 80 MG
80 TABLET,CHEWABLE ORAL EVERY 8 HOURS PRN
Status: DISCONTINUED | OUTPATIENT
Start: 2021-06-16 | End: 2021-06-16 | Stop reason: HOSPADM

## 2021-06-16 RX ORDER — LABETALOL HYDROCHLORIDE 5 MG/ML
5 INJECTION, SOLUTION INTRAVENOUS
Status: DISCONTINUED | OUTPATIENT
Start: 2021-06-16 | End: 2021-06-16 | Stop reason: HOSPADM

## 2021-06-16 RX ORDER — DOCUSATE SODIUM 100 MG/1
100 CAPSULE, LIQUID FILLED ORAL 3 TIMES DAILY
Qty: 21 CAPSULE | Refills: 0 | Status: SHIPPED | OUTPATIENT
Start: 2021-06-16 | End: 2021-06-23

## 2021-06-16 RX ORDER — MORPHINE SULFATE 4 MG/ML
2 INJECTION, SOLUTION INTRAMUSCULAR; INTRAVENOUS
Status: DISCONTINUED | OUTPATIENT
Start: 2021-06-16 | End: 2021-06-16 | Stop reason: HOSPADM

## 2021-06-16 RX ORDER — MORPHINE SULFATE 10 MG/ML
5 INJECTION, SOLUTION INTRAMUSCULAR; INTRAVENOUS
Status: DISCONTINUED | OUTPATIENT
Start: 2021-06-16 | End: 2021-06-16 | Stop reason: HOSPADM

## 2021-06-16 RX ORDER — OXYCODONE HYDROCHLORIDE AND ACETAMINOPHEN 5; 325 MG/1; MG/1
1 TABLET ORAL EVERY 4 HOURS PRN
Qty: 10 TABLET | Refills: 0 | Status: SHIPPED | OUTPATIENT
Start: 2021-06-16 | End: 2021-06-19

## 2021-06-16 RX ORDER — SODIUM CHLORIDE, SODIUM LACTATE, POTASSIUM CHLORIDE, CALCIUM CHLORIDE 600; 310; 30; 20 MG/100ML; MG/100ML; MG/100ML; MG/100ML
INJECTION, SOLUTION INTRAVENOUS CONTINUOUS
Status: DISCONTINUED | OUTPATIENT
Start: 2021-06-16 | End: 2021-06-16 | Stop reason: HOSPADM

## 2021-06-16 RX ORDER — OXYCODONE HCL 5 MG/5 ML
10 SOLUTION, ORAL ORAL
Status: DISCONTINUED | OUTPATIENT
Start: 2021-06-16 | End: 2021-06-16 | Stop reason: HOSPADM

## 2021-06-16 RX ORDER — MORPHINE SULFATE 4 MG/ML
1 INJECTION, SOLUTION INTRAMUSCULAR; INTRAVENOUS
Status: DISCONTINUED | OUTPATIENT
Start: 2021-06-16 | End: 2021-06-16 | Stop reason: HOSPADM

## 2021-06-16 RX ORDER — LIDOCAINE HYDROCHLORIDE 40 MG/ML
SOLUTION TOPICAL PRN
Status: DISCONTINUED | OUTPATIENT
Start: 2021-06-16 | End: 2021-06-16 | Stop reason: SURG

## 2021-06-16 RX ORDER — ACETAMINOPHEN 500 MG
1000 TABLET ORAL ONCE
Status: COMPLETED | OUTPATIENT
Start: 2021-06-16 | End: 2021-06-16

## 2021-06-16 RX ORDER — MIDAZOLAM HYDROCHLORIDE 1 MG/ML
1 INJECTION INTRAMUSCULAR; INTRAVENOUS
Status: DISCONTINUED | OUTPATIENT
Start: 2021-06-16 | End: 2021-06-16 | Stop reason: HOSPADM

## 2021-06-16 RX ORDER — BUPIVACAINE HYDROCHLORIDE 2.5 MG/ML
INJECTION, SOLUTION EPIDURAL; INFILTRATION; INTRACAUDAL
Status: DISCONTINUED
Start: 2021-06-16 | End: 2021-06-16 | Stop reason: HOSPADM

## 2021-06-16 RX ORDER — HALOPERIDOL 5 MG/ML
1 INJECTION INTRAMUSCULAR
Status: DISCONTINUED | OUTPATIENT
Start: 2021-06-16 | End: 2021-06-16 | Stop reason: HOSPADM

## 2021-06-16 RX ORDER — IBUPROFEN 600 MG/1
600 TABLET ORAL EVERY 6 HOURS PRN
Qty: 30 TABLET | Refills: 0 | Status: SHIPPED | OUTPATIENT
Start: 2021-06-16 | End: 2021-08-03

## 2021-06-16 RX ORDER — GABAPENTIN 300 MG/1
300 CAPSULE ORAL ONCE
Status: COMPLETED | OUTPATIENT
Start: 2021-06-16 | End: 2021-06-16

## 2021-06-16 RX ADMIN — ACETAMINOPHEN 1000 MG: 500 TABLET ORAL at 08:53

## 2021-06-16 RX ADMIN — LIDOCAINE HYDROCHLORIDE 50 MG: 20 INJECTION, SOLUTION EPIDURAL; INFILTRATION; INTRACAUDAL at 09:12

## 2021-06-16 RX ADMIN — Medication 200 MCG: at 09:33

## 2021-06-16 RX ADMIN — GABAPENTIN 300 MG: 300 CAPSULE ORAL at 08:53

## 2021-06-16 RX ADMIN — MIDAZOLAM HYDROCHLORIDE 2 MG: 1 INJECTION, SOLUTION INTRAMUSCULAR; INTRAVENOUS at 09:09

## 2021-06-16 RX ADMIN — FENTANYL CITRATE 50 MCG: 50 INJECTION, SOLUTION INTRAMUSCULAR; INTRAVENOUS at 09:12

## 2021-06-16 RX ADMIN — METOCLOPRAMIDE 10 MG: 5 INJECTION, SOLUTION INTRAMUSCULAR; INTRAVENOUS at 09:16

## 2021-06-16 RX ADMIN — SUGAMMADEX 140 MG: 100 INJECTION, SOLUTION INTRAVENOUS at 09:54

## 2021-06-16 RX ADMIN — ONDANSETRON 4 MG: 2 INJECTION INTRAMUSCULAR; INTRAVENOUS at 09:50

## 2021-06-16 RX ADMIN — KETOROLAC TROMETHAMINE 30 MG: 30 INJECTION, SOLUTION INTRAMUSCULAR at 09:52

## 2021-06-16 RX ADMIN — PROPOFOL 100 MG: 10 INJECTION, EMULSION INTRAVENOUS at 09:12

## 2021-06-16 RX ADMIN — SODIUM CHLORIDE, POTASSIUM CHLORIDE, SODIUM LACTATE AND CALCIUM CHLORIDE: 600; 310; 30; 20 INJECTION, SOLUTION INTRAVENOUS at 08:48

## 2021-06-16 RX ADMIN — ROCURONIUM BROMIDE 30 MG: 10 INJECTION, SOLUTION INTRAVENOUS at 09:12

## 2021-06-16 RX ADMIN — FENTANYL CITRATE 50 MCG: 50 INJECTION, SOLUTION INTRAMUSCULAR; INTRAVENOUS at 09:35

## 2021-06-16 RX ADMIN — LIDOCAINE HYDROCHLORIDE 4 ML: 40 SOLUTION TOPICAL at 09:12

## 2021-06-16 RX ADMIN — Medication 100 MCG: at 09:22

## 2021-06-16 RX ADMIN — POVIDONE IODINE 15 ML: 100 SOLUTION TOPICAL at 08:49

## 2021-06-16 RX ADMIN — DIPHENHYDRAMINE HYDROCHLORIDE 12.5 MG: 50 INJECTION, SOLUTION INTRAMUSCULAR; INTRAVENOUS at 09:16

## 2021-06-16 ASSESSMENT — PAIN SCALES - GENERAL: PAIN_LEVEL: 0

## 2021-06-16 ASSESSMENT — FIBROSIS 4 INDEX: FIB4 SCORE: 1.71

## 2021-06-16 NOTE — OR SURGEON
Immediate Post OP Note    PreOp Diagnosis: 1. Chronic abdominal pain  2. Pelvic floor relaxation  3. Hx of hysterectomy and BSO 4. Small Cystocele and rectocele      PostOp Diagnosis: same      Procedure(s):  Diagnostic laparoscopy - Wound Class: Clean    Surgeon(s):  Librado Rosenberg M.D.    Assistant:  Neto Amato M.D.    Anesthesiologist/Type of Anesthesia:  Anesthesiologist: Barbara Pang M.D./General    Surgical Staff:  Circulator: Scarlet Smith R.N.  Scrub Person: Laurel Mercado    Specimens removed if any:  * No specimens in log *    Estimated Blood Loss: minimal, Urine:200 ml    Findings: Normal appearing abdomen,pelvis and appendix. No pelvic adhesive disease noted    Complications: none apparent        6/16/2021 10:00 AM Librado Rosenberg M.D.

## 2021-06-16 NOTE — ANESTHESIA POSTPROCEDURE EVALUATION
Patient: Karuna Reid    Procedure Summary     Date: 06/16/21 Room / Location: [facility] ROOM 25 / SURGERY SAME DAY [facility]    Anesthesia Start: 0907 Anesthesia Stop: 1001    Procedure: diagnostic laparoscopy (Abdomen) Diagnosis: (GENERALIZED ABDOMINAL PAIN)    Surgeons: Librado Rosenberg M.D. Responsible Provider: Barbara Pang M.D.    Anesthesia Type: general ASA Status: 2          Final Anesthesia Type: general  Last vitals  BP   Blood Pressure : (!) 91/56    Temp   36.5 °C (97.7 °F)    Pulse   61   Resp   18    SpO2   95 %      Anesthesia Post Evaluation    Patient location during evaluation: PACU  Patient participation: complete - patient participated  Level of consciousness: awake and alert  Pain score: 0    Airway patency: patent  Anesthetic complications: no  Cardiovascular status: hemodynamically stable  Respiratory status: acceptable  Hydration status: euvolemic    PONV: none          No complications documented.     Nurse Pain Score: 0 (NPRS)

## 2021-06-16 NOTE — PROGRESS NOTES
1000- Patient arrived from OR and connected to monitors. Report received from anesthesiologist and OR circulator. Vital signs are stable. Patient has unlabored breathing, 4L mask. Incisions are clean, dry and intact. Patient is arousable on calling, reports no pain or nausea at this time.    1015- Patient is on room air and resting comfortbaly. Patient is sipping on water and tolerating well. Vital signs are stable. Patient reports no pain.    1040- Patient's , Parrish, updated. Patient is still resting comfortably with no pain.     1050- Patient meets phase II criteria. Discussed discharge plan of care with patient and she expresses understanding.     1100- Ambulated patient to the restroom to void. Patient was not successful. Patient ambulated back to her bed to rest. Vital signs remain stable after ambulation. Patient is sipping on water and denies pain.     1125- Updated patient's . Patient is still resting comfortably vital signs are stable.    1140- Report given to April RN to take over phase 2 care.

## 2021-06-16 NOTE — DISCHARGE INSTRUCTIONS
ACTIVITY: Rest and take it easy for the first 24 hours.  A responsible adult is recommended to remain with you during that time.  It is normal to feel sleepy.  We encourage you to not do anything that requires balance, judgment or coordination.    MILD FLU-LIKE SYMPTOMS ARE NORMAL. YOU MAY EXPERIENCE GENERALIZED MUSCLE ACHES, THROAT IRRITATION, HEADACHE AND/OR SOME NAUSEA.    FOR 24 HOURS DO NOT:  Drive, operate machinery or run household appliances.  Drink beer or alcoholic beverages.   Make important decisions or sign legal documents.    SPECIAL INSTRUCTIONS: SEE PELVISCOPY HANDOUT    DIET: To avoid nausea, slowly advance diet as tolerated, avoiding spicy or greasy foods for the first day.  Add more substantial food to your diet according to your physician's instructions.  INCREASE FLUIDS AND FIBER TO AVOID CONSTIPATION.    SURGICAL DRESSING/BATHING: Okay to shower after 24 hours.  No hot tubs, no tub baths, and no swimming until cleared by your doctor,    FOLLOW-UP APPOINTMENT:  A follow-up appointment should be arranged with your doctor; call to schedule.    You should CALL YOUR PHYSICIAN if you develop:  Fever greater than 101 degrees F.  Pain not relieved by medication, or persistent nausea or vomiting.  Excessive bleeding (blood soaking through dressing) or unexpected drainage from the wound.  Extreme redness or swelling around the incision site, drainage of pus or foul smelling drainage.  Inability to urinate or empty your bladder within 8 hours.  Problems with breathing or chest pain.    You should call 911 if you develop problems with breathing or chest pain.  If you are unable to contact your doctor or surgical center, you should go to the nearest emergency room or urgent care center.  Physician's telephone #: Dr Wayne [phone]    If any questions arise, call your doctor.  If your doctor is not available, please feel free to call the Surgical Center at [phone]. The Contact Center is open  Monday through Friday 7AM to 5PM and may speak to a nurse at [phone], or toll free at [phone].     A registered nurse may call you a few days after your surgery to see how you are doing after your procedure.    MEDICATIONS: Resume taking daily medication.  Take prescribed pain medication with food.  If no medication is prescribed, you may take non-aspirin pain medication if needed.  PAIN MEDICATION CAN BE VERY CONSTIPATING.  Take a stool softener or laxative such as senokot, pericolace, or milk of magnesia if needed.    Prescription has been sent to your pharmacy for Percocet.    If your physician has prescribed pain medication that includes Acetaminophen (Tylenol), do not take additional Acetaminophen (Tylenol) while taking the prescribed medication.    Depression / Suicide Risk    As you are discharged from this [facility] facility, it is important to learn how to keep safe from harming yourself.    Recognize the warning signs:  · Abrupt changes in personality, positive or negative- including increase in energy   · Giving away possessions  · Change in eating patterns- significant weight changes-  positive or negative  · Change in sleeping patterns- unable to sleep or sleeping all the time   · Unwillingness or inability to communicate  · Depression  · Unusual sadness, discouragement and loneliness  · Talk of wanting to die  · Neglect of personal appearance   · Rebelliousness- reckless behavior  · Withdrawal from people/activities they love  · Confusion- inability to concentrate     If you or a loved one observes any of these behaviors or has concerns about self-harm, here's what you can do:  · Talk about it- your feelings and reasons for harming yourself  · Remove any means that you might use to hurt yourself (examples: pills, rope, extension cords, firearm)  · Get professional help from the community (Mental Health, Substance Abuse, psychological counseling)  · Do not be alone:Call your Safe  Contact- someone whom you trust who will be there for you.  · Call your local CRISIS HOTLINE 106-3299 or 791-744-3180  · Call your local Children's Mobile Crisis Response Team [place] (250) 435-0710 or www.RealLifeConnect  · Call the toll free National Suicide Prevention Hotlines   · National Suicide Prevention Lifeline 493-464-PPVA (8293)  · National [employer] Line Network 800-SUICIDE (349-6002)

## 2021-06-16 NOTE — OR NURSING
1151- Pt arrived to phase II via recliner. Report from Radha FORD. Lap sites on abdomen with dermabond, CDI x3. Pt unable to void at this time. Pt receiving IV fluids and drinking water,    1155- Pt's , Parrish, at bedside.    1210- Discharge instructions discussed. Verbalized understanding.    1220- Report to Perla FORD    1240- Report back from Perla FORD, care reassumed.    1300- Pt up to bathroom via recliner. Able to void without difficulty. Dressed independently.    1319- Pt meets discharge criteria at this time. PIV removed with tip intact. Pt escorted out of department in wheelchair by Melo HELTON with all belongings. Discharged home to responsible adult.

## 2021-06-16 NOTE — ANESTHESIA PREPROCEDURE EVALUATION
Pt is not allergic to opioids. She gets constipated.    Relevant Problems   No relevant active problems       Physical Exam    Airway   Mallampati: II  TM distance: >3 FB  Neck ROM: full       Cardiovascular - normal exam  Rhythm: regular  Rate: normal  (-) murmur     Dental - normal exam           Pulmonary - normal exam  Breath sounds clear to auscultation     Abdominal    Neurological - normal exam                 Anesthesia Plan    ASA 2       Plan - general       Airway plan will be ETT          Induction: intravenous    Postoperative Plan: Postoperative administration of opioids is intended.    Pertinent diagnostic labs and testing reviewed    Informed Consent:    Anesthetic plan and risks discussed with patient.    Use of blood products discussed with: patient whom consented to blood products.

## 2021-06-16 NOTE — ANESTHESIA PROCEDURE NOTES
Airway    Date/Time: 6/16/2021 9:12 AM  Performed by: Barbara Pang M.D.  Authorized by: Barbara Pang M.D.     Location:  OR  Urgency:  Elective  Indications for Airway Management:  Anesthesia      Spontaneous Ventilation: absent    Sedation Level:  Deep  Preoxygenated: Yes    Patient Position:  Sniffing  Mask Difficulty Assessment:  2 - vent by mask + OA or adjuvant +/- NMBA  Final Airway Type:  Endotracheal airway  Final Endotracheal Airway:  ETT  Cuffed: Yes    Technique Used for Successful ETT Placement:  Direct laryngoscopy    Insertion Site:  Oral  Blade Type:  Katie  Laryngoscope Blade/Videolaryngoscope Blade Size:  3  ETT Size (mm):  6.5  Measured from:  Teeth  ETT to Teeth (cm):  21  Placement Verified by: auscultation and capnometry    Cormack-Lehane Classification:  Grade IIa - partial view of glottis  Number of Attempts at Approach:  1

## 2021-06-16 NOTE — OP REPORT
DATE OF SERVICE:  06/16/2021     PREOPERATIVE DIAGNOSES:  1.  Chronic abdominal pain.  2.  History of robotic hysterectomy with removal of tubes and ovaries.  3.  Pelvic floor relaxation.  4.  Small cystocele and rectocele.     POSTOPERATIVE DIAGNOSES:  1.  Chronic abdominal pain.  2.  History of robotic hysterectomy with removal of tubes and ovaries.  3.  Pelvic floor relaxation.  4.  Small cystocele and rectocele.     PROCEDURE:  Diagnostic laparoscopy.     SURGEON:  Librado Rosenberg MD     ASSISTANT:  Neto Amato MD     ANESTHESIA:  General endotracheal anesthesia.     ANESTHESIOLOGIST:  Barbara Pang MD     COMPLICATIONS:  None.     ESTIMATED BLOOD LOSS:  Minimal.     URINE OUTPUT:  200 mL straight catheterized.     FINDINGS:  Atrophic external genitalia without any rashes or lesions.  Vaginal   exam revealed small cystocele and small rectocele.  There were no nodularity   palpable, vaginal mucosa was atrophic.  Intraoperatively, there were no pelvic   adhesive disease at previous surgical sites.  There were normal appearing   abdomen without any abdominal adhesions.  There were normal appearing liver   and stomach.  Appendix was visualized and appears normal.  Uterus, tubes and   ovaries were surgically absent.     DESCRIPTION OF PROCEDURE:  After informed consent were obtained, the patient   was taken to the operating room and placed in dorsal supine position.  General   endotracheal anesthesia was administered.  The patient was then prepped and   draped in normal sterile fashion in dorsal lithotomy position in [facility].  A formal time-out was called.  Exam under anesthesia was performed   with the above noted findings after the bladder was straight catheterized for   200 mL of clear urine.  A sponge stick was placed in the vagina as a means for   manipulation.     Attention was then turned to the abdomen where lidocaine was infiltrated   infraumbilically.  A small incision was made and Veress  needle was placed   while tenting the abdominal wall.  Intraabdominal placement was confirmed with   H2O syringe test.  Pneumoperitoneum was obtained with opening pressure of 6   mmHg and pneumoperitoneum was maintained at 15 mmHg.  After insufflation of 3   liters of CO2 gas, a 5 mm Optiview trocar was placed under visualization and   intraabdominal placement was confirmed with laparoscope.  The patient was then   placed in Trendelenburg position.  Lidocaine was infiltrated in bilateral   lower abdomen and two 5 mm trocars were placed under direct visualization   avoiding the inferior epigastric vessels.  The patient was then placed further   in Trendelenburg and survey of the abdomen and pelvis was carried out with no   adhesive diseases noted.  There were no omental adhesion.  There were no   bowel adhesions.  Uterus, tubes and ovaries were surgically absent.  Appendix   was visualized and appears normal.  There was a small right inguinal hernia   visualized that was noted previously on imaging.     Pneumoperitoneum was reversed and lower abdominal ports were removed under   direct visualization followed by the umbilical port.  The skin was closed in a   subcuticular fashion of 4-0 Monocryl followed by Dermabond.  Sponge stick was   removed from the vagina.     The patient tolerated the procedure well.  Sponge, lap, needle and instrument   counts were correct x3.  The patient was extubated and taken to recovery room   in a stable fashion.        ______________________________  Librado Rosenberg MD RN/KRISTIN    DD:  06/16/2021 10:26  DT:  06/16/2021 12:12    Job#:  [id number]

## 2021-06-16 NOTE — ANESTHESIA TIME REPORT
Anesthesia Start and Stop Event Times     Date Time Event    6/16/2021 0903 Ready for Procedure     0907 Anesthesia Start     1001 Anesthesia Stop        Responsible Staff  06/16/21    Name Role Begin End    Barbara Pang M.D. Anesth 0907 1001        Preop Diagnosis (Free Text):  Pre-op Diagnosis     GENERALIZED ABDOMINAL PAIN        Preop Diagnosis (Codes):    Post op Diagnosis  Abdominal pain      Premium Reason  Non-Premium    Comments:

## 2021-06-30 ENCOUNTER — GYNECOLOGY VISIT (OUTPATIENT)
Dept: OBGYN | Facility: CLINIC | Age: 66
End: 2021-06-30
Payer: MEDICARE

## 2021-06-30 VITALS — BODY MASS INDEX: 24.93 KG/M2 | SYSTOLIC BLOOD PRESSURE: 123 MMHG | DIASTOLIC BLOOD PRESSURE: 64 MMHG | WEIGHT: 143 LBS

## 2021-06-30 DIAGNOSIS — Z09 POSTOP CHECK: ICD-10-CM

## 2021-06-30 DIAGNOSIS — Z98.890 S/P LAPAROSCOPY: ICD-10-CM

## 2021-06-30 PROCEDURE — 99024 POSTOP FOLLOW-UP VISIT: CPT | Performed by: OBSTETRICS & GYNECOLOGY

## 2021-06-30 ASSESSMENT — FIBROSIS 4 INDEX: FIB4 SCORE: 1.71

## 2021-06-30 NOTE — PROGRESS NOTES
Subjective:      Karuna Reid is a 65 y.o. female who presents for Gynecologic Exam (Post-Op)            HPI patient is a 65-year-old with history of chronic abdominal type pain symptoms who presents today for follow-up status post laparoscopy on 6/16/2021.  Patient has had a history of robotic hysterectomy with BSO and bladder suspension.  Patient is doing well except for once in a while she has some mild gas discomfort.  She denies any significant abdominal pain or incisional pain and is no longer using any pain medication.  She reports normal bowel and bladder functions.  Denies any nausea vomiting or dysuria.  Patient states she is doing well overall and has no concerns.  She states she started pelvic floor therapy yesterday.    ROS all organ systems were reviewed and were negative except for complaints in HPI       Objective:     /64 (BP Location: Left arm, Patient Position: Sitting, BP Cuff Size: Adult)   Wt 64.9 kg (143 lb)   BMI 24.93 kg/m²      Physical Exam  Vitals and nursing note reviewed. Exam conducted with a chaperone present.   Constitutional:       General: She is not in acute distress.     Appearance: Normal appearance. She is normal weight. She is not toxic-appearing.   HENT:      Head: Normocephalic and atraumatic.   Eyes:      General: No scleral icterus.        Right eye: No discharge.         Left eye: No discharge.      Conjunctiva/sclera: Conjunctivae normal.   Cardiovascular:      Rate and Rhythm: Normal rate and regular rhythm.      Pulses: Normal pulses.      Heart sounds: Normal heart sounds. No murmur heard.   No gallop.    Pulmonary:      Effort: Pulmonary effort is normal. No respiratory distress.      Breath sounds: Normal breath sounds. No wheezing.   Abdominal:      General: Abdomen is flat. Bowel sounds are normal. There is no distension.      Palpations: Abdomen is soft.      Tenderness: There is no abdominal tenderness.      Comments: Laparoscopic incisions are healed    Musculoskeletal:         General: Normal range of motion.      Cervical back: Normal range of motion and neck supple.      Right lower leg: No edema.      Left lower leg: No edema.   Skin:     General: Skin is warm and dry.      Coloration: Skin is not jaundiced.      Findings: No lesion.   Neurological:      General: No focal deficit present.      Mental Status: She is alert and oriented to person, place, and time.   Psychiatric:         Mood and Affect: Mood normal.         Behavior: Behavior normal.         Thought Content: Thought content normal.         Judgment: Judgment normal.              Discussion:  I discussed with patient in detail lateral laparoscopy was normal without any evidence of adhesions or foreign body.  I took 4 pages of laparoscopic pictures intraoperatively and I explained to patient his pictures and provided her with copies.  I reassured patient that she does not have any foreign body floating in her abdomen and I did not see any scar tissue and that entire abdomen and pelvis appears normal.  I encourage patient to continue pelvic floor therapy.         Assessment/Plan:        1. Postop check  Patient is status post diagnostic laparoscopy and 6/16/2021.  Patient is doing well and incisions are healed.  Patient can resume normal activities  Patient to continue pelvic floor therapy    2. S/P laparoscopy  Laparoscopic findings were reviewed and laparoscopic pictures were provided to patient    3.  Precautions and plan of care were reviewed.  Patient to continue self breast exams and annual clinical breast exams and annual mammograms.

## 2021-07-02 ENCOUNTER — APPOINTMENT (OUTPATIENT)
Dept: PHYSICAL THERAPY | Facility: REHABILITATION | Age: 66
End: 2021-07-02
Attending: FAMILY MEDICINE
Payer: MEDICARE

## 2021-07-07 ENCOUNTER — APPOINTMENT (OUTPATIENT)
Dept: PHYSICAL THERAPY | Facility: REHABILITATION | Age: 66
End: 2021-07-07
Attending: FAMILY MEDICINE
Payer: MEDICARE

## 2021-07-07 ENCOUNTER — NON-PROVIDER VISIT (OUTPATIENT)
Dept: MEDICAL GROUP | Facility: MEDICAL CENTER | Age: 66
End: 2021-07-07
Payer: MEDICARE

## 2021-07-07 DIAGNOSIS — Z23 NEED FOR VACCINATION: ICD-10-CM

## 2021-07-07 PROCEDURE — G0009 ADMIN PNEUMOCOCCAL VACCINE: HCPCS | Performed by: PHYSICIAN ASSISTANT

## 2021-07-07 PROCEDURE — 90732 PPSV23 VACC 2 YRS+ SUBQ/IM: CPT | Performed by: PHYSICIAN ASSISTANT

## 2021-07-07 NOTE — PROGRESS NOTES
"Karuna Reid is a 65 y.o. female here for a non-provider visit for:   PNEUMOVAX (PPSV23)    Reason for immunization: Overdue/Provider Recommended  Immunization records indicate need for vaccine: Yes, confirmed with [facility] [employer]  Minimum interval has been met for this vaccine: Yes  ABN completed: Not Indicated    VIS Dated  10/30/2019 was given to patient: Yes  All IAC Questionnaire questions were answered \"No.\"    Patient tolerated injection and no adverse effects were observed or reported: Yes    Pt scheduled for next dose in series: Not Indicated    "

## 2021-07-09 ENCOUNTER — APPOINTMENT (OUTPATIENT)
Dept: PHYSICAL THERAPY | Facility: REHABILITATION | Age: 66
End: 2021-07-09
Attending: FAMILY MEDICINE
Payer: MEDICARE

## 2021-07-13 PROBLEM — R73.9 HYPERGLYCEMIA: Status: ACTIVE | Noted: 2021-07-13

## 2021-07-13 PROBLEM — I73.9 PERIPHERAL VASCULAR DISEASE, UNSPECIFIED (HCC): Status: ACTIVE | Noted: 2021-07-13

## 2021-07-13 PROBLEM — E78.5 DYSLIPIDEMIA: Status: ACTIVE | Noted: 2021-07-13

## 2021-07-13 PROBLEM — D69.6 THROMBOCYTOPENIA (HCC): Status: ACTIVE | Noted: 2021-07-13

## 2021-07-14 ENCOUNTER — APPOINTMENT (OUTPATIENT)
Dept: PHYSICAL THERAPY | Facility: REHABILITATION | Age: 66
End: 2021-07-14
Attending: FAMILY MEDICINE
Payer: MEDICARE

## 2021-07-16 ENCOUNTER — APPOINTMENT (OUTPATIENT)
Dept: PHYSICAL THERAPY | Facility: REHABILITATION | Age: 66
End: 2021-07-16
Attending: FAMILY MEDICINE
Payer: MEDICARE

## 2021-07-21 ENCOUNTER — APPOINTMENT (OUTPATIENT)
Dept: PHYSICAL THERAPY | Facility: REHABILITATION | Age: 66
End: 2021-07-21
Attending: FAMILY MEDICINE
Payer: MEDICARE

## 2021-07-23 ENCOUNTER — APPOINTMENT (OUTPATIENT)
Dept: PHYSICAL THERAPY | Facility: REHABILITATION | Age: 66
End: 2021-07-23
Attending: FAMILY MEDICINE
Payer: MEDICARE

## 2021-07-30 ENCOUNTER — OFFICE VISIT (OUTPATIENT)
Dept: MEDICAL GROUP | Facility: MEDICAL CENTER | Age: 66
End: 2021-07-30
Payer: MEDICARE

## 2021-07-30 VITALS
OXYGEN SATURATION: 95 % | HEIGHT: 64 IN | BODY MASS INDEX: 24.69 KG/M2 | DIASTOLIC BLOOD PRESSURE: 68 MMHG | HEART RATE: 71 BPM | SYSTOLIC BLOOD PRESSURE: 102 MMHG | WEIGHT: 144.6 LBS | TEMPERATURE: 98.1 F

## 2021-07-30 DIAGNOSIS — M81.0 OSTEOPOROSIS, UNSPECIFIED OSTEOPOROSIS TYPE, UNSPECIFIED PATHOLOGICAL FRACTURE PRESENCE: ICD-10-CM

## 2021-07-30 DIAGNOSIS — I73.9 PERIPHERAL VASCULAR DISEASE, UNSPECIFIED (HCC): ICD-10-CM

## 2021-07-30 DIAGNOSIS — R41.3 MEMORY LOSS: ICD-10-CM

## 2021-07-30 DIAGNOSIS — D69.6 THROMBOCYTOPENIA (HCC): ICD-10-CM

## 2021-07-30 PROCEDURE — 99214 OFFICE O/P EST MOD 30 MIN: CPT | Performed by: FAMILY MEDICINE

## 2021-07-30 ASSESSMENT — FIBROSIS 4 INDEX: FIB4 SCORE: 1.71

## 2021-07-30 NOTE — ASSESSMENT & PLAN NOTE
Platelet count was very slightly low, it was 161, however, this is essentially average for her. Platelets vary from 161-180 over the past 10 yrs.

## 2021-07-30 NOTE — ASSESSMENT & PLAN NOTE
Per Dr. Maguire, there was osteoporosis seen in previous dexa, however, I reviewed both dexa from 2017 and 2019, z-scores did not show osteoporisis. She did have slight increased risk of fx in the femur, however, 10% risk in the   stabe from 2017 to 2019.

## 2021-07-30 NOTE — PROGRESS NOTES
Subjective:     CC: Diagnoses of Thrombocytopenia (HCC), Peripheral vascular disease, unspecified (HCC), Osteoporosis, unspecified osteoporosis type, unspecified pathological fracture presence, and Memory loss were pertinent to this visit.    HPI: Patient is a 65 y.o. female established patient who presents today to review her visit with Geriatric Specialist. Patient was advised there were some concerns regarding blood work, Quanta flow screening, memory and osteoporosis.      Thrombocytopenia (HCC)  Platelet count was very slightly low, it was 161, however, this is essentially average for her. Platelets vary from 161-180 over the past 10 yrs. She has no spontaneous bleeding. Denies any blood in stool or urine. No vaginal bleeding. No bruising.     Peripheral vascular disease, unspecified (HCC)  Patient had quanta flow testing, which apparently showed decreased flow on the left. She has no coldness or pain in the foot. She does have pain in the left hip at times. No discoloration of the foot. Dr. Maguire reports increased athersclerotic burden, however, unclear where this diagnosis is from. No mention on recent CT., reviewed myself, did not see plaque in aorta or otherwise.     Osteoporosis  Per Dr. Maguire, there was osteoporosis seen in previous dexa, however, I reviewed both dexa from 2017 and 2019, z-scores did not show osteoporisis. She did have slight increased risk of fx in the femur, however, 10% risk in femur and stable from 2017 to 2019.    Memory loss  Patient evaluated by neurologist in Oct 2020. Cognitive impairment was found to be mild. Neurologist felt it was likely selective impairment on exma. MRI and imaging was completed to r/o strutural pathology. Parkinson's and lewy body were ruled out as well. Patient was offered follow up visit but she declined. [place] that her memory has been stable. Neurology recommended continued monitoring, not medication.     Past Medical History:   Diagnosis Date   •  Arthritis    • Bowel habit changes     constipation   • Dental disorder     implant    • Dyslipidemia 2021   • GERD (gastroesophageal reflux disease)    • Menopausal and postmenopausal disorder    • Muscle disorder    • Osteoporosis    • Peripheral vascular disease, unspecified ([facility]) 2021   • Urinary bladder disorder        Social History     Tobacco Use   • Smoking status: Former Smoker     Packs/day: 1.00     Years: 21.00     Pack years: 21.00     Types: Cigarettes     Start date: 1970     Quit date: 1991     Years since quittin.6   • Smokeless tobacco: Never Used   Vaping Use   • Vaping Use: Never used   Substance Use Topics   • Alcohol use: Yes     Alcohol/week: 0.6 oz     Types: 1 Standard drinks or equivalent per week     Comment: 2 per week   • Drug use: No       Current Outpatient Medications Ordered in Epic   Medication Sig Dispense Refill   • Cyanocobalamin (B-12) 500 MCG Tab Take 500 mcg by mouth every day.     • Tetrahydrozoline-Zn Sulfate (EYE DROPS AR OP) Administer  into affected eye(s) every day.     • Polyethylene Glycol 3350 (MIRALAX PO) Take  by mouth.     • Psyllium (METAMUCIL PO) Take  by mouth.     • Cholecalciferol (D3-1000) 1000 UNIT Cap      • Ascorbic Acid (VITAMIN C CR) 1500 MG Tab CR Take 1,500 mg by mouth every day.     • vitamin E 200 UNIT capsule Take 400 Units by mouth every day.     • Copper 5 MG Tab Take 3 mg by mouth every day.     • Zinc 10 MG Lozenge Spray 9 mg in mouth/throat.     • Omega-3 350 MG CAPSULE DELAYED RELEASE Take 360 mg by mouth every day.     • Lutein 10 MG Tab Take 5 mg by mouth every day.     • Multiple Vitamins-Minerals (MULTIVITAMIN ADULT PO) Take  by mouth.     • Biotin 2500 MCG Cap Take  by mouth.     • calcium carbonate (TUMS) 500 MG Chew Tab Take 500 mg by mouth every day.       No current [facility]-ordered facility-administered medications on file.       Allergies:  Hydrocodone, Other environmental, Prednisone, and Other drug    Health  "Maintenance: Completed    ROS:  Pulm: no sob, no cough  CV: no chest pain, no palpitations      Objective:       Exam:  /68 (BP Location: Left arm, Patient Position: Sitting, BP Cuff Size: Adult long)   Pulse 71   Temp 36.7 °C (98.1 °F) (Temporal)   Ht 1.613 m (5' 3.5\")   Wt 65.6 kg (144 lb 9.6 oz)   SpO2 95%   BMI 25.21 kg/m²  Body mass index is 25.21 kg/m².    General: Normal appearing. No distress.  HEAD: NCAT  EYES: conjunctiva clear, lids without ptosis, pupils equal and reactive to light  EARS: ears normal shape and contour.  MOUTH: normal dentition   Neck:  Normal ROM  Pulmonary: RRR, no M/R/G. Normal effort. Normal respiratory rate.  Cardiovascular: CTAB. Well perfused. No LE edema. B/L feet warm. Normal cap refill.   Neurologic: Grossly normal, no focal deficits  Skin: Warm and dry.  No obvious lesions.  Musculoskeletal: Normal gait and station.   Psych: Normal mood and affect. Alert and oriented x3. Judgment and insight is normal.      Labs: 6/10/21 Results reviewed and discussed with the patient, questions answered.    Assessment & Plan:     65 y.o. female with the following -     1. Thrombocytopenia (HCC)  New problem. Extremely mild. Platelet count 161. Average ranges from 169-180 in the past 10 yrs. As above, no signs of bruising, no spontaneous bleeding. CBC otherwise normal. Plan to continue to monitor.     2. Peripheral vascular disease, unspecified (HCC)  Patient had abnormal screening test. Reviewed recent CT, no sign of atherosclerosis. Feet are warm with good cap refill on exam. Referral to vasc surgery for further work up to rule out PAD.     - REFERRAL TO VASCULAR SURGERY    3. Osteoporosis, unspecified osteoporosis type, unspecified pathological fracture presence  Patient has Osteopenia, no osteoporosis. Mildly increased risk of fracture in the hip, however, stable bone density over the past 2 years. Plan to repeat dexa in another couple years.     4. Memory loss  Chronic problem. " Has been evaluated by neurology. Advised it was quite mild and possibly even selective. Normal brain MRI and lab work up. Was advised to continue to monitor for worsening. Patient reports her memory has been stable and she does not wish further work up with neurology. Medication would not be indicated at this point.     Return if symptoms worsen or fail to improve.    Please note that this dictation was created using voice recognition software. I have made every reasonable attempt to correct obvious errors, but I expect that there are errors of grammar and possibly content that I did not discover before finalizing the note.

## 2021-08-10 NOTE — NON-PROVIDER
Member wanting to discuss new PCP. Educated member with the differences between  her Annual Physical, Annual Wellness with PCP and  Comprehensive Health Assessment. Member will think a little more about changing PCP and follow up as needed

## 2021-08-20 ENCOUNTER — OFFICE VISIT (OUTPATIENT)
Dept: MEDICAL GROUP | Facility: MEDICAL CENTER | Age: 66
End: 2021-08-20
Payer: MEDICARE

## 2021-08-20 VITALS
TEMPERATURE: 97.4 F | DIASTOLIC BLOOD PRESSURE: 66 MMHG | BODY MASS INDEX: 24.59 KG/M2 | SYSTOLIC BLOOD PRESSURE: 104 MMHG | WEIGHT: 144 LBS | HEART RATE: 71 BPM | OXYGEN SATURATION: 97 % | HEIGHT: 64 IN

## 2021-08-20 DIAGNOSIS — D69.6 THROMBOCYTOPENIA (HCC): ICD-10-CM

## 2021-08-20 DIAGNOSIS — E78.5 DYSLIPIDEMIA: ICD-10-CM

## 2021-08-20 PROCEDURE — 99213 OFFICE O/P EST LOW 20 MIN: CPT | Performed by: FAMILY MEDICINE

## 2021-08-20 ASSESSMENT — FIBROSIS 4 INDEX: FIB4 SCORE: 1.71

## 2021-08-20 NOTE — ASSESSMENT & PLAN NOTE
Chronic problem.     ASCVD risk low at 3.9%  Not exercising as much.   She has had an increase in LDL.

## 2021-08-20 NOTE — PROGRESS NOTES
Subjective:     CC: Diagnoses of Thrombocytopenia (HCC) and Dyslipidemia were pertinent to this visit.    HPI: Patient is a 65 y.o. female established patient who presents today with concern about her lipid panel.       Dyslipidemia  Chronic problem.     ASCVD risk low at 3.9%  Not exercising as much.   She has had an increase in LDL.     Thrombocytopenia (HCC)  Chronic problem. Discuss that platelet count is slightly low. She has no issues with spontaneous bleeding or bruising.    Past Medical History:   Diagnosis Date   • Arthritis    • Bowel habit changes     constipation   • Dental disorder     implant    • Dyslipidemia 2021   • GERD (gastroesophageal reflux disease)    • Menopausal and postmenopausal disorder    • Muscle disorder    • Osteoporosis    • Peripheral vascular disease, unspecified (HCC) 2021   • Urinary bladder disorder        Social History     Tobacco Use   • Smoking status: Former Smoker     Packs/day: 1.00     Years: 21.00     Pack years: 21.00     Types: Cigarettes     Start date: 1970     Quit date: 1991     Years since quittin.6   • Smokeless tobacco: Never Used   Vaping Use   • Vaping Use: Never used   Substance Use Topics   • Alcohol use: Yes     Alcohol/week: 0.6 oz     Types: 1 Standard drinks or equivalent per week     Comment: 2 per week   • Drug use: No       Current Outpatient Medications Ordered in Epic   Medication Sig Dispense Refill   • Cyanocobalamin (B-12) 500 MCG Tab Take 500 mcg by mouth every day.     • Tetrahydrozoline-Zn Sulfate (EYE DROPS AR OP) Administer  into affected eye(s) every day.     • Polyethylene Glycol 3350 (MIRALAX PO) Take  by mouth.     • Psyllium (METAMUCIL PO) Take  by mouth.     • Cholecalciferol (D3-1000) 1000 UNIT Cap      • Ascorbic Acid (VITAMIN C CR) 1500 MG Tab CR Take 1,500 mg by mouth every day.     • vitamin E 200 UNIT capsule Take 400 Units by mouth every day.     • Copper 5 MG Tab Take 3 mg by mouth every day.    "  • Zinc 10 MG Lozenge Spray 9 mg in mouth/throat.     • Omega-3 350 MG CAPSULE DELAYED RELEASE Take 360 mg by mouth every day.     • Lutein 10 MG Tab Take 5 mg by mouth every day.     • Multiple Vitamins-Minerals (MULTIVITAMIN ADULT PO) Take  by mouth.     • Biotin 2500 MCG Cap Take  by mouth.     • calcium carbonate (TUMS) 500 MG Chew Tab Take 500 mg by mouth every day.       No current [facility]-ordered facility-administered medications on file.       Allergies:  Hydrocodone, Other environmental, Prednisone, and Other drug    Health Maintenance: Completed    ROS:  Gen: no fevers/chill, no changes in weight  Eyes: no changes in vision  ENT: no sore throat, no hearing loss, no bloody nose  Pulm: no sob, no cough  CV: no chest pain, no palpitations  GI: no nausea/vomiting, no diarrhea  : no dysuria  MSk: no myalgias  Skin: no rash  Neuro: no headaches, no numbness/tingling  Heme/Lymph: no easy bruising      Objective:       Exam:  /66 (BP Location: Right arm, Patient Position: Sitting, BP Cuff Size: Adult long)   Pulse 71   Temp 36.3 °C (97.4 °F) (Temporal)   Ht 1.613 m (5' 3.5\")   Wt 65.3 kg (144 lb)   SpO2 97%   BMI 25.11 kg/m²  Body mass index is 25.11 kg/m².    General: Normal appearing. No distress.  HEAD: NCAT  EYES: conjunctiva clear, lids without ptosis, pupils equal and reactive to light  EARS: ears normal shape and contour.  MOUTH: normal dentition   Neck:  Normal ROM  Pulmonary: Normal effort. Normal respiratory rate.  Cardiovascular: Well perfused. No LE edema  Neurologic: Grossly normal, no focal deficits  Skin: Warm and dry.  No obvious lesions.  Musculoskeletal: Normal gait and station.   Psych: Normal mood and affect. Alert and oriented x3. Judgment and insight is normal.     Labs: 6/3/21 Results reviewed and discussed with the patient, questions answered.    Assessment & Plan:     65 y.o. female with the following -     1. Thrombocytopenia (HCC)  Chronic problem. Discuss that platelet count " is slightly low. She has no issues with spontaneous bleeding or bruising. Order placed for repeat CBC in September. (3 months since last check).   - CBC WITH DIFFERENTIAL; Future    2. Dyslipidemia  Chronic problem.  Lipid panel looks good aside from her LDL which is 120.  It has increased a bit since previous tests.  Advised her ASCVD risk remains low, no need for statin at this time.  Plan to repeat lipid panel next month as it will have been a year.  - Lipid Profile; Future      Return in about 4 weeks (around 9/17/2021) for well woman exam.    Please note that this dictation was created using voice recognition software. I have made every reasonable attempt to correct obvious errors, but I expect that there are errors of grammar and possibly content that I did not discover before finalizing the note.

## 2021-09-23 ENCOUNTER — HOSPITAL ENCOUNTER (OUTPATIENT)
Dept: LAB | Facility: MEDICAL CENTER | Age: 66
End: 2021-09-23
Attending: FAMILY MEDICINE
Payer: MEDICARE

## 2021-09-23 DIAGNOSIS — D69.6 THROMBOCYTOPENIA (HCC): ICD-10-CM

## 2021-09-23 DIAGNOSIS — E78.5 DYSLIPIDEMIA: ICD-10-CM

## 2021-09-23 LAB
BASOPHILS # BLD AUTO: 0.5 % (ref 0–1.8)
BASOPHILS # BLD: 0.03 K/UL (ref 0–0.12)
CHOLEST SERPL-MCNC: 194 MG/DL (ref 100–199)
EOSINOPHIL # BLD AUTO: 0.1 K/UL (ref 0–0.51)
EOSINOPHIL NFR BLD: 1.6 % (ref 0–6.9)
ERYTHROCYTE [DISTWIDTH] IN BLOOD BY AUTOMATED COUNT: 45.9 FL (ref 35.9–50)
HCT VFR BLD AUTO: 44 % (ref 37–47)
HDLC SERPL-MCNC: 61 MG/DL
HGB BLD-MCNC: 14.6 G/DL (ref 12–16)
IMM GRANULOCYTES # BLD AUTO: 0.02 K/UL (ref 0–0.11)
IMM GRANULOCYTES NFR BLD AUTO: 0.3 % (ref 0–0.9)
LDLC SERPL CALC-MCNC: 118 MG/DL
LYMPHOCYTES # BLD AUTO: 2.2 K/UL (ref 1–4.8)
LYMPHOCYTES NFR BLD: 35.9 % (ref 22–41)
MCH RBC QN AUTO: 30.5 PG (ref 27–33)
MCHC RBC AUTO-ENTMCNC: 33.2 G/DL (ref 33.6–35)
MCV RBC AUTO: 91.9 FL (ref 81.4–97.8)
MONOCYTES # BLD AUTO: 0.48 K/UL (ref 0–0.85)
MONOCYTES NFR BLD AUTO: 7.8 % (ref 0–13.4)
NEUTROPHILS # BLD AUTO: 3.29 K/UL (ref 2–7.15)
NEUTROPHILS NFR BLD: 53.9 % (ref 44–72)
NRBC # BLD AUTO: 0 K/UL
NRBC BLD-RTO: 0 /100 WBC
PLATELET # BLD AUTO: 181 K/UL (ref 164–446)
PMV BLD AUTO: 11.5 FL (ref 9–12.9)
RBC # BLD AUTO: 4.79 M/UL (ref 4.2–5.4)
TRIGL SERPL-MCNC: 75 MG/DL (ref 0–149)
WBC # BLD AUTO: 6.1 K/UL (ref 4.8–10.8)

## 2021-09-23 PROCEDURE — 80061 LIPID PANEL: CPT

## 2021-09-23 PROCEDURE — 85025 COMPLETE CBC W/AUTO DIFF WBC: CPT

## 2021-09-23 PROCEDURE — 36415 COLL VENOUS BLD VENIPUNCTURE: CPT

## 2021-09-27 ENCOUNTER — OFFICE VISIT (OUTPATIENT)
Dept: MEDICAL GROUP | Facility: MEDICAL CENTER | Age: 66
End: 2021-09-27
Payer: MEDICARE

## 2021-09-27 VITALS
DIASTOLIC BLOOD PRESSURE: 66 MMHG | WEIGHT: 143.8 LBS | SYSTOLIC BLOOD PRESSURE: 104 MMHG | OXYGEN SATURATION: 96 % | HEART RATE: 67 BPM | HEIGHT: 64 IN | BODY MASS INDEX: 24.55 KG/M2 | TEMPERATURE: 97.3 F

## 2021-09-27 DIAGNOSIS — I73.9 PERIPHERAL VASCULAR DISEASE, UNSPECIFIED (HCC): ICD-10-CM

## 2021-09-27 DIAGNOSIS — R41.3 MEMORY LOSS: ICD-10-CM

## 2021-09-27 DIAGNOSIS — M85.80 OSTEOPENIA, UNSPECIFIED LOCATION: ICD-10-CM

## 2021-09-27 DIAGNOSIS — D69.6 THROMBOCYTOPENIA (HCC): ICD-10-CM

## 2021-09-27 DIAGNOSIS — R07.89 STERNAL PAIN: ICD-10-CM

## 2021-09-27 DIAGNOSIS — N95.9 MENOPAUSAL AND POSTMENOPAUSAL DISORDER: ICD-10-CM

## 2021-09-27 DIAGNOSIS — E78.5 DYSLIPIDEMIA: ICD-10-CM

## 2021-09-27 PROBLEM — M25.561 ACUTE PAIN OF RIGHT KNEE: Status: RESOLVED | Noted: 2020-09-15 | Resolved: 2021-09-27

## 2021-09-27 PROBLEM — R73.9 HYPERGLYCEMIA: Status: RESOLVED | Noted: 2021-07-13 | Resolved: 2021-09-27

## 2021-09-27 PROBLEM — M81.0 OSTEOPOROSIS: Status: RESOLVED | Noted: 2018-06-20 | Resolved: 2021-09-27

## 2021-09-27 PROCEDURE — G0439 PPPS, SUBSEQ VISIT: HCPCS | Performed by: FAMILY MEDICINE

## 2021-09-27 SDOH — HEALTH STABILITY: PHYSICAL HEALTH: ON AVERAGE, HOW MANY MINUTES DO YOU ENGAGE IN EXERCISE AT THIS LEVEL?: 0 MIN

## 2021-09-27 SDOH — ECONOMIC STABILITY: HOUSING INSECURITY: IN THE LAST 12 MONTHS, HOW MANY PLACES HAVE YOU LIVED?: 1

## 2021-09-27 SDOH — ECONOMIC STABILITY: FOOD INSECURITY: WITHIN THE PAST 12 MONTHS, THE FOOD YOU BOUGHT JUST DIDN'T LAST AND YOU DIDN'T HAVE MONEY TO GET MORE.: NEVER TRUE

## 2021-09-27 SDOH — ECONOMIC STABILITY: INCOME INSECURITY: HOW HARD IS IT FOR YOU TO PAY FOR THE VERY BASICS LIKE FOOD, HOUSING, MEDICAL CARE, AND HEATING?: NOT HARD AT ALL

## 2021-09-27 SDOH — ECONOMIC STABILITY: FOOD INSECURITY: WITHIN THE PAST 12 MONTHS, YOU WORRIED THAT YOUR FOOD WOULD RUN OUT BEFORE YOU GOT MONEY TO BUY MORE.: NEVER TRUE

## 2021-09-27 SDOH — ECONOMIC STABILITY: INCOME INSECURITY: IN THE LAST 12 MONTHS, WAS THERE A TIME WHEN YOU WERE NOT ABLE TO PAY THE MORTGAGE OR RENT ON TIME?: NO

## 2021-09-27 SDOH — HEALTH STABILITY: PHYSICAL HEALTH: ON AVERAGE, HOW MANY DAYS PER WEEK DO YOU ENGAGE IN MODERATE TO STRENUOUS EXERCISE (LIKE A BRISK WALK)?: 0 DAYS

## 2021-09-27 SDOH — HEALTH STABILITY: MENTAL HEALTH
STRESS IS WHEN SOMEONE FEELS TENSE, NERVOUS, ANXIOUS, OR CAN'T SLEEP AT NIGHT BECAUSE THEIR MIND IS TROUBLED. HOW STRESSED ARE YOU?: ONLY A LITTLE

## 2021-09-27 ASSESSMENT — LIFESTYLE VARIABLES
HOW MANY STANDARD DRINKS CONTAINING ALCOHOL DO YOU HAVE ON A TYPICAL DAY: PATIENT DECLINED
HOW OFTEN DO YOU HAVE SIX OR MORE DRINKS ON ONE OCCASION: NEVER
HOW OFTEN DO YOU HAVE A DRINK CONTAINING ALCOHOL: 2-4 TIMES A MONTH

## 2021-09-27 ASSESSMENT — SOCIAL DETERMINANTS OF HEALTH (SDOH)
HOW OFTEN DO YOU ATTENT MEETINGS OF THE CLUB OR ORGANIZATION YOU BELONG TO?: PATIENT DECLINED
HOW OFTEN DO YOU ATTEND CHURCH OR RELIGIOUS SERVICES?: MORE THAN 4 TIMES PER YEAR
IN A TYPICAL WEEK, HOW MANY TIMES DO YOU TALK ON THE PHONE WITH FAMILY, FRIENDS, OR NEIGHBORS?: MORE THAN THREE TIMES A WEEK
HOW OFTEN DO YOU ATTEND CHURCH OR RELIGIOUS SERVICES?: MORE THAN 4 TIMES PER YEAR
HOW OFTEN DO YOU HAVE A DRINK CONTAINING ALCOHOL: 2-4 TIMES A MONTH
WITHIN THE PAST 12 MONTHS, YOU WORRIED THAT YOUR FOOD WOULD RUN OUT BEFORE YOU GOT THE MONEY TO BUY MORE: NEVER TRUE
IN A TYPICAL WEEK, HOW MANY TIMES DO YOU TALK ON THE PHONE WITH FAMILY, FRIENDS, OR NEIGHBORS?: MORE THAN THREE TIMES A WEEK
HOW OFTEN DO YOU GET TOGETHER WITH FRIENDS OR RELATIVES?: MORE THAN THREE TIMES A WEEK
DO YOU BELONG TO ANY CLUBS OR ORGANIZATIONS SUCH AS CHURCH GROUPS UNIONS, FRATERNAL OR ATHLETIC GROUPS, OR SCHOOL GROUPS?: YES
HOW OFTEN DO YOU GET TOGETHER WITH FRIENDS OR RELATIVES?: MORE THAN THREE TIMES A WEEK
DO YOU BELONG TO ANY CLUBS OR ORGANIZATIONS SUCH AS CHURCH GROUPS UNIONS, FRATERNAL OR ATHLETIC GROUPS, OR SCHOOL GROUPS?: YES
HOW MANY DRINKS CONTAINING ALCOHOL DO YOU HAVE ON A TYPICAL DAY WHEN YOU ARE DRINKING: PATIENT DECLINED
HOW OFTEN DO YOU ATTENT MEETINGS OF THE CLUB OR ORGANIZATION YOU BELONG TO?: PATIENT DECLINED
HOW HARD IS IT FOR YOU TO PAY FOR THE VERY BASICS LIKE FOOD, HOUSING, MEDICAL CARE, AND HEATING?: NOT HARD AT ALL
HOW OFTEN DO YOU HAVE SIX OR MORE DRINKS ON ONE OCCASION: NEVER

## 2021-09-27 ASSESSMENT — ENCOUNTER SYMPTOMS: GENERAL WELL-BEING: GOOD

## 2021-09-27 ASSESSMENT — FIBROSIS 4 INDEX: FIB4 SCORE: 1.52

## 2021-09-27 ASSESSMENT — ACTIVITIES OF DAILY LIVING (ADL): BATHING_REQUIRES_ASSISTANCE: 0

## 2021-09-27 ASSESSMENT — PATIENT HEALTH QUESTIONNAIRE - PHQ9: CLINICAL INTERPRETATION OF PHQ2 SCORE: 0

## 2021-09-27 NOTE — PROGRESS NOTES
Chief Complaint   Patient presents with   • Annual Wellness Visit   • Lab Results         HPI:  Karuna is a 65 y.o. here for Medicare Annual Wellness Visit    Peripheral vascular disease, unspecified (HCC)  Seen at vein clinic. No   Completely normal blood flow    Menopausal and postmenopausal disorder  Patient occasionally has hot flashes. Worse in the summer. Tolerable. Does not want HRT.    Osteopenia  Chronic problem. Reviewed previous bone density tests. Patient taking vit D and calcium. Attempts to stay active. Up to date on dexa.     Memory loss  Chronic problem. Patient has been evaluated by Dr. Badillo (neurolog). 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 No findings on MRI or labs. She reports her memory seems stable and very mildly impaired. She is aware she was supposed to follow up with neuro but elects to hold off for now.     Sternal pain  Chronic problem. Patient reports that has improved with buying new, larger bras. No SOB, no pain with exertion.     Thrombocytopenia (HCC)  Platelet count was very slightly low, it was 161, however, this is essentially average for her. Platelets vary from 161-180 over the past 10 yrs.     Dyslipidemia  Chronic problem.     ASCVD risk low at 3.9%  Not exercising as much.   She has had an increase in LDL.         Patient Active Problem List    Diagnosis Date Noted   • Thrombocytopenia (HCC) 07/13/2021   • Dyslipidemia 07/13/2021   • S/P laparoscopy 06/16/2021   • Pelvic floor relaxation 05/20/2021   • Sternal pain 04/27/2021   • Left leg pain 04/27/2021   • Hx of total hysterectomy with removal of both tubes and ovaries 03/05/2021   • Epigastric pain 01/22/2021   • History of hysterectomy 01/12/2021   • Generalized abdominal pain 09/17/2020   • Memory loss 09/15/2020   • Osteopenia 01/22/2018   • Menopausal and postmenopausal disorder        Current Outpatient Medications   Medication Sig Dispense Refill   • Cyanocobalamin (B-12) 500 MCG Tab  Take 500 mcg by mouth every day.     • Tetrahydrozoline-Zn Sulfate (EYE DROPS AR OP) Administer  into affected eye(s) every day.     • Polyethylene Glycol 3350 (MIRALAX PO) Take  by mouth.     • Psyllium (METAMUCIL PO) Take  by mouth.     • Cholecalciferol (D3-1000) 1000 UNIT Cap      • Ascorbic Acid (VITAMIN C CR) 1500 MG Tab CR Take 1,500 mg by mouth every day.     • vitamin E 200 UNIT capsule Take 400 Units by mouth every day.     • Copper 5 MG Tab Take 3 mg by mouth every day.     • Zinc 10 MG Lozenge Spray 9 mg in mouth/throat.     • Omega-3 350 MG CAPSULE DELAYED RELEASE Take 360 mg by mouth every day.     • Lutein 10 MG Tab Take 5 mg by mouth every day.     • Multiple Vitamins-Minerals (MULTIVITAMIN ADULT PO) Take  by mouth.     • Biotin 2500 MCG Cap Take  by mouth.     • calcium carbonate (TUMS) 500 MG Chew Tab Take 500 mg by mouth every day.       No current facility-administered medications for this visit.        Patient is taking medications as noted in medication list.  Current supplements as per medication list.     Allergies: Hydrocodone, Other environmental, Prednisone, and Other drug    Current social contact/activities: Family/Friends    Is patient current with immunizations? Yes  She  reports that she quit smoking about 30 years ago. Her smoking use included cigarettes. She started smoking about 51 years ago. She has a 21.00 pack-year smoking history. She has never used smokeless tobacco. She reports current alcohol use of about 0.6 oz of alcohol per week. She reports that she does not use drugs.  Counseling given: Not Answered        DPA/Advanced directive: Patient has Advanced Directive, but it is not on file. Instructed to bring in a copy to scan into their chart.    ROS:    Gait: Uses no assistive device   Ostomy: No   Other tubes: No   Amputations: No   Chronic oxygen use No   Last eye exam 09/2021  Wears hearing aids: No   : Denies any urinary leakage during the last 6  months      Screening:        Depression Screening    Little interest or pleasure in doing things?  0 - not at all  Feeling down, depressed, or hopeless? 0 - not at all  Patient Health Questionnaire Score: 0    If depressive symptoms identified deferred to follow up visit unless specifically addressed in assessment and plan.    Interpretation of PHQ-9 Total Score   Score Severity   1-4 No Depression   5-9 Mild Depression   10-14 Moderate Depression   15-19 Moderately Severe Depression   20-27 Severe Depression    Screening for Cognitive Impairment    Three Minute Recall (captain, garden, picture)  3/3    Jerod clock face with all 12 numbers and set the hands to show 5 past 8.  Yes    If cognitive concerns identified, deferred for follow up unless specifically addressed in assessment and plan.    Fall Risk Assessment    Has the patient had two or more falls in the last year or any fall with injury in the last year?  Yes  If fall risk identified, deferred for follow up unless specifically addressed in assessment and plan.    Safety Assessment    Throw rugs on floor.  Yes  Handrails on all stairs.  Yes  Good lighting in all hallways.  Yes  Difficulty hearing.  No  Patient counseled about all safety risks that were identified.    Functional Assessment ADLs    Are there any barriers preventing you from cooking for yourself or meeting nutritional needs?  No.    Are there any barriers preventing you from driving safely or obtaining transportation?  No.    Are there any barriers preventing you from using a telephone or calling for help?  No.    Are there any barriers preventing you from shopping?  No.    Are there any barriers preventing you from taking care of your own finances?  No.    Are there any barriers preventing you from managing your medications?  No.    Are there any barriers preventing you from showering, bathing or dressing yourself?  No.    Are you currently engaging in any exercise or physical activity?  No.      What is your perception of your health?  Good.    Health Maintenance Summary                COVID-19 Vaccine Postponed 2022 Originally 1967. Patient Refused    MAMMOGRAM Next Due 3/1/2022      Done 3/1/2021 MA-SCREENING MAMMO BILAT W/TOMOSYNTHESIS W/CAD     Patient has more history with this topic...    Annual Wellness Visit Next Due 2022      Done 2021 LOS: CO ANNUAL WELLNESS VISIT-INCLUDES PPPS SUBSEQUE*     Patient has more history with this topic...    BONE DENSITY Next Due 2024      Done 2019 DS-BONE DENSITY STUDY (DEXA)     Patient has more history with this topic...    COLORECTAL CANCER SCREENING Next Due 2026     IMM DTaP/Tdap/Td Vaccine Next Due 2030      Done 2020 Imm Admin: Tdap Vaccine     Patient has more history with this topic...          Patient Care Team:  Kiesha Briscoe M.D. as PCP - General (Family Medicine)  Fabrice Carroll D.O. (Inactive) (Internal Medicine)  Mila David, PT, DPT (Inactive) as Physical Therapist (Physical Therapy)  Charity Sky PT (Inactive) as Physical Therapist (Physical Therapy)  Zenaida Cuba M.D. as Consulting Physician (Obstetrics & Gynecology)    Social History     Tobacco Use   • Smoking status: Former Smoker     Packs/day: 1.00     Years: 21.00     Pack years: 21.00     Types: Cigarettes     Start date: 1970     Quit date: 1991     Years since quittin.7   • Smokeless tobacco: Never Used   Vaping Use   • Vaping Use: Never used   Substance Use Topics   • Alcohol use: Yes     Alcohol/week: 0.6 oz     Types: 1 Standard drinks or equivalent per week     Comment: 2 per week   • Drug use: No     Family History   Problem Relation Age of Onset   • Psychiatric Illness Mother         Alzheimer's   • Cancer Maternal Aunt         breast cancer     She  has a past medical history of Arthritis, Bowel habit changes, Dental disorder, Dyslipidemia (2021), GERD (gastroesophageal reflux disease),  Menopausal and postmenopausal disorder, Muscle disorder, Osteoporosis, Peripheral vascular disease, unspecified (HCC) (7/13/2021), and Urinary bladder disorder. She also has no past medical history of Addisons disease (HCC), Adrenal disorder (HCC), Allergy, Anemia, Anxiety, Blood transfusion without reported diagnosis, Cataract, Clotting disorder (HCC), COPD (chronic obstructive pulmonary disease) (HCC), Cushings syndrome (HCC), Depression, Diabetic neuropathy (HCC), Glaucoma, Goiter, Head ache, HIV (human immunodeficiency virus infection) (HCC), IBD (inflammatory bowel disease), Meningitis, Migraine, Parathyroid disorder (HCC), Pituitary disease (HCC), Pulmonary emphysema (HCC), Sickle cell disease (HCC), Stroke (HCC), Thyroid disease, or Tuberculosis.   Past Surgical History:   Procedure Laterality Date   • PB LAP,DIAGNOSTIC ABDOMEN  6/16/2021    Procedure: diagnostic laparoscopy;  Surgeon: Librado Rosenberg M.D.;  Location: SURGERY SAME DAY [facility] ER;  Service: Gynecology   • MASS EXCISION ORTHO Left 7/17/2020    Procedure: EXCISION, MASS-FOOT soft tissue TUMOR;  Surgeon: Lenny Silverman D.P.M.;  Location: SURGERY SAME DAY [facility];  Service: Podiatry   • CYSTOSCOPY  10/19/2016    Procedure: CYSTOSCOPY;  Surgeon: Lenny Cazares M.D.;  Location: [facility];  Service:    • HYSTERECTOMY ROBOTIC  10/19/2016    Procedure: HYSTERECTOMY ROBOTIC Total with BILATERAL SALPINGO-OOPHORECTOMY;  Surgeon: Lenny Cazares M.D.;  Location: SURGERY [facility];  Service:    • ANTERIOR AND POSTERIOR REPAIR  10/19/2016    Procedure: POSTERIOR REPAIR;  Surgeon: Lenny Cazares M.D.;  Location: SURGERY [facility];  Service:    • VAGINAL SUSPENSION  10/19/2016    Procedure: VAGINAL SUSPENSION Uterosacral, and  McCalls Culdoplasty;  Surgeon: Lenny Cazares M.D.;  Location: [facility];  Service:    • TONSILLECTOMY  4/18/2014    Performed by Nirali Dos Santos M.D. at [facility]   •  "OTHER SURGICAL PROCEDURE  1989    Dr Doss; Neck superficial exploration   • TONSILLECTOMY  1959   • OTHER      Throat exploration   • OTHER ORTHOPEDIC SURGERY Left     mass excision           Exam:     /66 (BP Location: Right arm, Patient Position: Sitting, BP Cuff Size: Adult long)   Pulse 67   Temp 36.3 °C (97.3 °F) (Temporal)   Ht 1.613 m (5' 3.5\")   Wt 65.2 kg (143 lb 12.8 oz)   SpO2 96%  Body mass index is 25.07 kg/m².    Hearing good.    Dentition fair  Alert, oriented in no acute distress.  Eye contact is good, speech goal directed, affect calm      Assessment and Plan. The following treatment and monitoring plan is recommended:    1. Peripheral vascular disease, unspecified (HCC)     2. Menopausal and postmenopausal disorder     3. Osteopenia, unspecified location     4. Memory loss     5. Sternal pain     6. Thrombocytopenia (HCC)     7. Dyslipidemia           Services suggested: No services needed at this time  Health Care Screening recommendations as per orders if indicated.  Referrals offered: PT/OT/Nutrition counseling/Behavioral Health/Smoking cessation as per orders if indicated.    Discussion today about general wellness and lifestyle habits:    · Prevent falls and reduce trip hazards; Cautioned about securing or removing rugs.  · Have a working fire alarm and carbon monoxide detector;   · Engage in regular physical activity and social activities       Follow-up: Return in about 3 months (around 12/27/2021) for patient requesting well woman exam.  "

## 2021-09-28 NOTE — ASSESSMENT & PLAN NOTE
Chronic problem. Patient reports that has improved with buying new, larger bras. No SOB, no pain with exertion.

## 2021-09-28 NOTE — ASSESSMENT & PLAN NOTE
Chronic problem. Reviewed previous bone density tests. Patient taking vit D and calcium. Attempts to stay active. Up to date on dexa.

## 2021-09-28 NOTE — ASSESSMENT & PLAN NOTE
Chronic problem. Patient has been evaluated by Dr. Badillo (neurolog). 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 No findings on MRI or labs. She reports her memory seems stable and very mildly impaired. She is aware she was supposed to follow up with neuro but elects to hold off for now.

## 2021-10-11 ENCOUNTER — OFFICE VISIT (OUTPATIENT)
Dept: MEDICAL GROUP | Facility: MEDICAL CENTER | Age: 66
End: 2021-10-11
Payer: MEDICARE

## 2021-10-11 VITALS
TEMPERATURE: 97 F | BODY MASS INDEX: 24.59 KG/M2 | HEART RATE: 72 BPM | DIASTOLIC BLOOD PRESSURE: 64 MMHG | OXYGEN SATURATION: 95 % | WEIGHT: 144 LBS | HEIGHT: 64 IN | SYSTOLIC BLOOD PRESSURE: 98 MMHG

## 2021-10-11 DIAGNOSIS — Z00.00 WELL ADULT EXAM: ICD-10-CM

## 2021-10-11 PROCEDURE — 99397 PER PM REEVAL EST PAT 65+ YR: CPT | Performed by: FAMILY MEDICINE

## 2021-10-11 ASSESSMENT — FIBROSIS 4 INDEX: FIB4 SCORE: 1.52

## 2021-10-11 NOTE — PROGRESS NOTES
CC:   Chief Complaint   Patient presents with   • Gynecologic Exam   • Hypercholesterolemia Follow-up       HPI:   Karuna Reid is a 65 y.o. female who presents for annual exam. She is feeling well and denies any complaints.    Ob-Gyn/ History:    Patient has GYN provider: no  /Para:    Last Pap Smear:  . No history of abnormal pap smears.  Gyn Surgery:  Hysterectomy in 2016.  She is no longer currently sexually active.  Last menstrual period:  Back in 2016.   Post-menopausal bleeding: none  Urinary incontinence: none  Sexual Activity: not currently  Dyspareunia: n/a    Health Maintenance  Advanced directive: none on file  Osteoporosis Screen/ DEXA: up to date  PT/vit D for falls prevention: recommended Vit D.   Cholesterol Screening: up to date, reviewed today   Diabetes Screening: n/a   Aspirin Use: n/a    Diet: good   Exercise: not much, no dedicated exercise.   Substance Abuse: denies any misuse     Cancer screening  Colorectal Cancer Screening: up to date    Lung Cancer Screening: n/a    Cervical Cancer Screening: n/a, hysterectomy due to prolapse.    Breast Cancer Screening: up to date     Infectious disease screening/Immunizations  --Hepatitis C Screening: negative   --Immunizations:    Influenza: up to date    Tetanus: up to date   Shingles: up to date   Pneumococcal : up to date         She  has a past medical history of Arthritis, Bowel habit changes, Dental disorder, Dyslipidemia (2021), GERD (gastroesophageal reflux disease), Menopausal and postmenopausal disorder, Muscle disorder, Osteoporosis, Peripheral vascular disease, unspecified (HCC) (2021), and Urinary bladder disorder. She also has no past medical history of Addisons disease ([facility]), Adrenal disorder ([facility]), Allergy, Anemia, Anxiety, Blood transfusion without reported diagnosis, Cataract, Clotting disorder ([facility]), COPD (chronic obstructive pulmonary disease) ([facility]), Cushings syndrome ([facility]), Depression, Diabetic neuropathy  (HCC), Glaucoma, Goiter, Head ache, HIV (human immunodeficiency virus infection) (HCC), IBD (inflammatory bowel disease), Meningitis, Migraine, Parathyroid disorder (HCC), Pituitary disease (HCC), Pulmonary emphysema (HCC), Sickle cell disease (HCC), Stroke (HCC), Thyroid disease, or Tuberculosis.  She  has a past surgical history that includes tonsillectomy (); other surgical procedure (); tonsillectomy (2014); cystoscopy (10/19/2016); hysterectomy robotic (10/19/2016); anterior and posterior repair (10/19/2016); vaginal suspension (10/19/2016); mass excision ortho (Left, 2020); other orthopedic surgery (Left); other; and pr lap,diagnostic abdomen (2021).    Family History   Problem Relation Age of Onset   • Psychiatric Illness Mother         Alzheimer's   • Cancer Maternal Aunt         breast cancer       Social History     Socioeconomic History   • Marital status:      Spouse name: Not on file   • Number of children: Not on file   • Years of education: Not on file   • Highest education level: Some college, no degree   Occupational History   • Not on file   Tobacco Use   • Smoking status: Former Smoker     Packs/day: 1.00     Years: 21.00     Pack years: 21.00     Types: Cigarettes     Start date: 1970     Quit date: 1991     Years since quittin.7   • Smokeless tobacco: Never Used   Vaping Use   • Vaping Use: Never used   Substance and Sexual Activity   • Alcohol use: Yes     Alcohol/week: 0.6 oz     Types: 1 Standard drinks or equivalent per week     Comment: 2 per week   • Drug use: No   • Sexual activity: Never     Partners: Male     Comment: , one daughter,  (now retired).   Other Topics Concern   • Not on file   Social History Narrative   • Not on file     Social Determinants of Health     Financial Resource Strain: Low Risk    • Difficulty of Paying Living Expenses: Not hard at all   Food Insecurity: No Food Insecurity   • Worried About  Running Out of Food in the Last Year: Never true   • Ran Out of Food in the Last Year: Never true   Transportation Needs: No Transportation Needs   • Lack of Transportation (Medical): No   • Lack of Transportation (Non-Medical): No   Physical Activity: Inactive   • Days of Exercise per Week: 0 days   • Minutes of Exercise per Session: 0 min   Stress: No Stress Concern Present   • Feeling of Stress : Only a little   Social Connections: Socially Integrated   • Frequency of Communication with Friends and Family: More than three times a week   • Frequency of Social Gatherings with Friends and Family: More than three times a week   • Attends [employer] Services: More than 4 times per year   • Active Member of Clubs or Organizations: Yes   • Attends Club or Organization Meetings: Patient refused   • Marital Status:    Intimate Partner Violence:    • Fear of Current or Ex-Partner:    • Emotionally Abused:    • Physically Abused:    • Sexually Abused:        Patient Active Problem List    Diagnosis Date Noted   • Thrombocytopenia (HCC) 07/13/2021   • Dyslipidemia 07/13/2021   • S/P laparoscopy 06/16/2021   • Pelvic floor relaxation 05/20/2021   • Sternal pain 04/27/2021   • Left leg pain 04/27/2021   • Hx of total hysterectomy with removal of both tubes and ovaries 03/05/2021   • Epigastric pain 01/22/2021   • History of hysterectomy 01/12/2021   • Generalized abdominal pain 09/17/2020   • Memory loss 09/15/2020   • Osteopenia 01/22/2018   • Menopausal and postmenopausal disorder          Current Outpatient Medications   Medication Sig Dispense Refill   • Cyanocobalamin (B-12) 500 MCG Tab Take 500 mcg by mouth every day.     • Tetrahydrozoline-Zn Sulfate (EYE DROPS AR OP) Administer  into affected eye(s) every day.     • Polyethylene Glycol 3350 (MIRALAX PO) Take  by mouth.     • Psyllium (METAMUCIL PO) Take  by mouth.     • Cholecalciferol (D3-1000) 1000 UNIT Cap      • Ascorbic Acid (VITAMIN C CR) 1500 MG Tab CR  "Take 1,500 mg by mouth every day.     • vitamin E 200 UNIT capsule Take 400 Units by mouth every day.     • Copper 5 MG Tab Take 3 mg by mouth every day.     • Zinc 10 MG Lozenge Spray 9 mg in mouth/throat.     • Omega-3 350 MG CAPSULE DELAYED RELEASE Take 360 mg by mouth every day.     • Lutein 10 MG Tab Take 5 mg by mouth every day.     • Multiple Vitamins-Minerals (MULTIVITAMIN ADULT PO) Take  by mouth.     • Biotin 2500 MCG Cap Take  by mouth.     • calcium carbonate (TUMS) 500 MG Chew Tab Take 500 mg by mouth every day.       No current facility-administered medications for this visit.     Allergies   Allergen Reactions   • Hydrocodone Unspecified     Severe constipation   • Other Environmental Swelling     Pollen   • Prednisone Swelling   • Other Drug Unspecified     Steroids, caused redness        Review of Systems   Constitutional: Negative for fever, chills and malaise/fatigue.   HENT: Negative for congestion.    Eyes: Negative for pain.   Respiratory: Negative for cough and shortness of breath.    Cardiovascular: Negative for leg swelling.   Gastrointestinal: Negative for nausea, vomiting, abdominal pain and diarrhea.   Genitourinary: Negative for dysuria and hematuria.   Skin: Negative for rash.   Neurological: Negative for dizziness, focal weakness and headaches.   Endo/Heme/Allergies: Does not bruise/bleed easily.   Psychiatric/Behavioral: Negative for depression.  The patient is not nervous/anxious.      Objective:     BP (!) 98/64 (BP Location: Right arm, Patient Position: Sitting, BP Cuff Size: Adult long)   Pulse 72   Temp 36.1 °C (97 °F) (Temporal)   Ht 1.613 m (5' 3.5\")   Wt 65.3 kg (144 lb)   SpO2 95%   BMI 25.11 kg/m²   Body mass index is 25.11 kg/m².  Wt Readings from Last 4 Encounters:   10/11/21 65.3 kg (144 lb)   09/27/21 65.2 kg (143 lb 12.8 oz)   08/20/21 65.3 kg (144 lb)   07/30/21 65.6 kg (144 lb 9.6 oz)       Physical Exam:  Constitutional: Well-developed and well-nourished. Not " diaphoretic. No distress.   Skin: Skin is warm and dry. No rash noted.  Head: Atraumatic without lesions.  Eyes: Conjunctivae and extraocular motions are normal. Pupils are equal, round, and reactive to light. No scleral icterus.   Mouth/Throat: mask in place throughout exam.   Breast: Breasts examined seated and supine. No skin changes, peau d'orange or nipple retraction. No discharge. No axillary or supraclavicular adenopathy. No masses or nodularity palpable.   Abdomen: Soft, non tender, and without distention. Active bowel sounds in all four quadrants. No rebound, guarding, masses or HSM.  :Perineum and external genitalia normal without rash. Vagina cuff intact. Cervix and uterus surgically absent. Normal bimanual exam.   Extremities: No cyanosis, clubbing, erythema, nor edema. Distal pulses intact and symmetric.   Musculoskeletal: All major joints AROM full in all directions without pain.  Neurological: Alert and oriented x 3. DTRs 2+/3 and symmetric. No cranial nerve deficit. 5/5 myotomes. Sensation intact. Negative Rhomberg.  Psychiatric:  Behavior, mood, and affect are appropriate.    Assessment and Plan:     1. Well adult exam       Reviewed labs. Discussed healthy diet and elevated cholesterol. Plan to f/u in 1 yr.  Health maintenance up to date  Encouraged patient to get her covid vaccine which she declines.     HCM:     Labs per orders  Immunizations per orders    Follow-up: Return in about 1 year (around 10/11/2022).    Please note that this dictation was created using voice recognition software. I have made every reasonable attempt to correct obvious errors, but I expect that there are errors of grammar and possibly content that I did not discover before finalizing the note.

## 2021-11-17 ENCOUNTER — TELEPHONE (OUTPATIENT)
Dept: HEALTH INFORMATION MANAGEMENT | Facility: OTHER | Age: 66
End: 2021-11-17

## 2021-11-17 DIAGNOSIS — Z78.0 POSTMENOPAUSAL: ICD-10-CM

## 2021-11-17 NOTE — TELEPHONE ENCOUNTER
1. Caller Name: Karuna Mardon                          Call Back Number: [phone] (home)       How would the patient prefer to be contacted with a response: [email or web address] message    2. SPECIFIC Action To Be Taken: Orders pending, please sign.    3. Diagnosis/Clinical Reason for Request: DEXA    4. Specialty & Provider Name/Lab/Imaging Location: [facility]    5. Is appointment scheduled for requested order/referral: no    Patient was not informed they will receive a return phone call from the office ONLY if there are any questions before processing their request. Advised to call back if they haven't received a call from the referral department in 5 days.

## 2021-11-30 ENCOUNTER — APPOINTMENT (RX ONLY)
Dept: URBAN - METROPOLITAN AREA CLINIC 6 | Facility: CLINIC | Age: 66
Setting detail: DERMATOLOGY
End: 2021-11-30

## 2021-11-30 DIAGNOSIS — L21.8 OTHER SEBORRHEIC DERMATITIS: ICD-10-CM | Status: WELL CONTROLLED

## 2021-11-30 DIAGNOSIS — Z71.89 OTHER SPECIFIED COUNSELING: ICD-10-CM

## 2021-11-30 DIAGNOSIS — D18.0 HEMANGIOMA: ICD-10-CM

## 2021-11-30 DIAGNOSIS — L82.1 OTHER SEBORRHEIC KERATOSIS: ICD-10-CM

## 2021-11-30 DIAGNOSIS — D22 MELANOCYTIC NEVI: ICD-10-CM

## 2021-11-30 DIAGNOSIS — L81.4 OTHER MELANIN HYPERPIGMENTATION: ICD-10-CM

## 2021-11-30 PROBLEM — D18.01 HEMANGIOMA OF SKIN AND SUBCUTANEOUS TISSUE: Status: ACTIVE | Noted: 2021-11-30

## 2021-11-30 PROBLEM — D22.71 MELANOCYTIC NEVI OF RIGHT LOWER LIMB, INCLUDING HIP: Status: ACTIVE | Noted: 2021-11-30

## 2021-11-30 PROBLEM — D22.5 MELANOCYTIC NEVI OF TRUNK: Status: ACTIVE | Noted: 2021-11-30

## 2021-11-30 PROCEDURE — ? COUNSELING

## 2021-11-30 PROCEDURE — ? TREATMENT REGIMEN

## 2021-11-30 PROCEDURE — ? SUNSCREEN TREATMENT REGIMEN

## 2021-11-30 PROCEDURE — 99203 OFFICE O/P NEW LOW 30 MIN: CPT

## 2021-11-30 ASSESSMENT — LOCATION DETAILED DESCRIPTION DERM
LOCATION DETAILED: RIGHT MEDIAL PLANTAR MIDFOOT
LOCATION DETAILED: LEFT INFERIOR MEDIAL MALAR CHEEK
LOCATION DETAILED: LEFT ULNAR DORSAL HAND
LOCATION DETAILED: UPPER STERNUM
LOCATION DETAILED: MID-FRONTAL SCALP
LOCATION DETAILED: PERIUMBILICAL SKIN
LOCATION DETAILED: LEFT RADIAL DORSAL HAND
LOCATION DETAILED: EPIGASTRIC SKIN
LOCATION DETAILED: SUPERIOR THORACIC SPINE
LOCATION DETAILED: RIGHT RADIAL DORSAL HAND
LOCATION DETAILED: LEFT INFERIOR FOREHEAD

## 2021-11-30 ASSESSMENT — LOCATION SIMPLE DESCRIPTION DERM
LOCATION SIMPLE: LEFT HAND
LOCATION SIMPLE: LEFT FOREHEAD
LOCATION SIMPLE: ANTERIOR SCALP
LOCATION SIMPLE: LEFT CHEEK
LOCATION SIMPLE: RIGHT HAND
LOCATION SIMPLE: UPPER BACK
LOCATION SIMPLE: ABDOMEN
LOCATION SIMPLE: RIGHT PLANTAR SURFACE
LOCATION SIMPLE: CHEST

## 2021-11-30 ASSESSMENT — LOCATION ZONE DERM
LOCATION ZONE: HAND
LOCATION ZONE: TRUNK
LOCATION ZONE: FEET
LOCATION ZONE: FACE
LOCATION ZONE: SCALP

## 2021-12-09 ENCOUNTER — HOSPITAL ENCOUNTER (OUTPATIENT)
Dept: RADIOLOGY | Facility: MEDICAL CENTER | Age: 66
End: 2021-12-09
Attending: FAMILY MEDICINE
Payer: MEDICARE

## 2021-12-09 DIAGNOSIS — Z78.0 POSTMENOPAUSAL: ICD-10-CM

## 2021-12-09 PROCEDURE — 77080 DXA BONE DENSITY AXIAL: CPT

## 2022-02-15 ENCOUNTER — OFFICE VISIT (OUTPATIENT)
Dept: MEDICAL GROUP | Facility: PHYSICIAN GROUP | Age: 67
End: 2022-02-15
Payer: MEDICARE

## 2022-02-15 VITALS
HEART RATE: 74 BPM | HEIGHT: 63 IN | DIASTOLIC BLOOD PRESSURE: 60 MMHG | BODY MASS INDEX: 26.31 KG/M2 | OXYGEN SATURATION: 96 % | TEMPERATURE: 98.3 F | SYSTOLIC BLOOD PRESSURE: 104 MMHG | WEIGHT: 148.5 LBS | RESPIRATION RATE: 16 BRPM

## 2022-02-15 DIAGNOSIS — M79.605 LEFT LEG PAIN: ICD-10-CM

## 2022-02-15 DIAGNOSIS — R63.5 WEIGHT GAIN: ICD-10-CM

## 2022-02-15 DIAGNOSIS — Z12.31 ENCOUNTER FOR SCREENING MAMMOGRAM FOR MALIGNANT NEOPLASM OF BREAST: ICD-10-CM

## 2022-02-15 DIAGNOSIS — E55.9 VITAMIN D DEFICIENCY: ICD-10-CM

## 2022-02-15 PROBLEM — D69.6 THROMBOCYTOPENIA (HCC): Status: RESOLVED | Noted: 2021-07-13 | Resolved: 2022-02-15

## 2022-02-15 PROCEDURE — 99214 OFFICE O/P EST MOD 30 MIN: CPT | Performed by: INTERNAL MEDICINE

## 2022-02-15 ASSESSMENT — FIBROSIS 4 INDEX: FIB4 SCORE: 1.54

## 2022-02-15 ASSESSMENT — PATIENT HEALTH QUESTIONNAIRE - PHQ9: CLINICAL INTERPRETATION OF PHQ2 SCORE: 0

## 2022-02-15 NOTE — PROGRESS NOTES
CC: Establish medical care.  Previous patient of .    HPI:  Karuna presents with the following    1. Left leg pain  Patient complains of ongoing left lower extremity pain which has been present for the last few months.  Symptoms occur mostly at nighttime and describes it as a left calf discomfort.  Patient denies any numbness tingling, sciatica cramping, restless leg symptoms, edema, claudication, low back pain or hip pain.  Patient followed up with [facility].  Patient completed bilateral ABIs which were within normal limits.  No symptoms during the daytime.  Patient keeps very active.    2. Encounter for screening mammogram for malignant neoplasm of breast  Patient due for mammograms yearly    3. Weight gain  Patient noticed a 20 pound weight gain over the last few months.  Patient did not change her physical activity or diet in any way.  Family history of hypothyroidism.  No recent TSH completed.    4. Vitamin D deficiency  Patient taking vitamin D supplements for mild osteopenia.      Patient Active Problem List    Diagnosis Date Noted   • Vitamin D deficiency 02/15/2022   • Dyslipidemia 07/13/2021   • S/P laparoscopy 06/16/2021   • Pelvic floor relaxation 05/20/2021   • Sternal pain 04/27/2021   • Left leg pain 04/27/2021   • Hx of total hysterectomy with removal of both tubes and ovaries 03/05/2021   • Epigastric pain 01/22/2021   • History of hysterectomy 01/12/2021   • Generalized abdominal pain 09/17/2020   • Memory loss 09/15/2020   • Osteopenia 01/22/2018   • Menopausal and postmenopausal disorder        Current Outpatient Medications   Medication Sig Dispense Refill   • Cyanocobalamin (B-12) 500 MCG Tab Take 500 mcg by mouth every day.     • Tetrahydrozoline-Zn Sulfate (EYE DROPS AR OP) Administer  into affected eye(s) every day.     • Cholecalciferol (D3-1000) 1000 UNIT Cap      • Ascorbic Acid (VITAMIN C CR) 1500 MG Tab CR Take 1,500 mg by mouth every day.     • vitamin E 200 UNIT  capsule Take 400 Units by mouth every day.     • Copper 5 MG Tab Take 3 mg by mouth every day.     • Zinc 10 MG Lozenge Spray 9 mg in mouth/throat.     • Omega-3 350 MG CAPSULE DELAYED RELEASE Take 360 mg by mouth every day.     • Lutein 10 MG Tab Take 5 mg by mouth every day.     • Multiple Vitamins-Minerals (MULTIVITAMIN ADULT PO) Take  by mouth.     • Biotin 2500 MCG Cap Take  by mouth.     • calcium carbonate (TUMS) 500 MG Chew Tab Take 500 mg by mouth every day.     • Polyethylene Glycol 3350 (MIRALAX PO) Take  by mouth.     • Psyllium (METAMUCIL PO) Take  by mouth.       No current facility-administered medications for this visit.         Allergies as of 02/15/2022 - Reviewed 02/15/2022   Allergen Reaction Noted   • Hydrocodone Unspecified 2021   • Other environmental Swelling 2021   • Prednisone Swelling 2020   • Other drug Unspecified 2016        Social History     Socioeconomic History   • Marital status:      Spouse name: Not on file   • Number of children: Not on file   • Years of education: Not on file   • Highest education level: Some college, no degree   Occupational History   • Not on file   Tobacco Use   • Smoking status: Former Smoker     Packs/day: 1.00     Years: 21.00     Pack years: 21.00     Types: Cigarettes     Start date: 1970     Quit date: 1991     Years since quittin.1   • Smokeless tobacco: Never Used   Vaping Use   • Vaping Use: Never used   Substance and Sexual Activity   • Alcohol use: Yes     Alcohol/week: 0.6 oz     Types: 1 Standard drinks or equivalent per week     Comment: 2 per week   • Drug use: No   • Sexual activity: Never     Partners: Male     Comment: , one daughter,  (now retired).   Other Topics Concern   • Not on file   Social History Narrative   • Not on file     Social Determinants of Health     Financial Resource Strain: Low Risk    • Difficulty of Paying Living Expenses: Not hard at all   Food  Insecurity: No Food Insecurity   • Worried About Running Out of Food in the Last Year: Never true   • Ran Out of Food in the Last Year: Never true   Transportation Needs: No Transportation Needs   • Lack of Transportation (Medical): No   • Lack of Transportation (Non-Medical): No   Physical Activity: Inactive   • Days of Exercise per Week: 0 days   • Minutes of Exercise per Session: 0 min   Stress: No Stress Concern Present   • Feeling of Stress : Only a little   Social Connections: Socially Integrated   • Frequency of Communication with Friends and Family: More than three times a week   • Frequency of Social Gatherings with Friends and Family: More than three times a week   • Attends Worship Services: More than 4 times per year   • Active Member of Clubs or Organizations: Yes   • Attends Club or Organization Meetings: Patient refused   • Marital Status:    Intimate Partner Violence:    • Fear of Current or Ex-Partner: Not on file   • Emotionally Abused: Not on file   • Physically Abused: Not on file   • Sexually Abused: Not on file   Housing Stability: Low Risk    • Unable to Pay for Housing in the Last Year: No   • Number of Places Lived in the Last Year: 1   • Unstable Housing in the Last Year: No       Family History   Problem Relation Age of Onset   • Psychiatric Illness Mother         Alzheimer's   • Cancer Maternal Aunt         breast cancer       Past Surgical History:   Procedure Laterality Date   • [place] LAP,DIAGNOSTIC ABDOMEN  6/16/2021    Procedure: diagnostic laparoscopy;  Surgeon: Librado Rosenberg M.D.;  Location: SURGERY SAME DAY [facility];  Service: Gynecology   • MASS EXCISION ORTHO Left 7/17/2020    Procedure: EXCISION, MASS-FOOT soft tissue TUMOR;  Surgeon: CHRISTINA WisdomPOSMAR;  Location: SURGERY SAME DAY [facility];  Service: Podiatry   • CYSTOSCOPY  10/19/2016    Procedure: CYSTOSCOPY;  Surgeon: Lenny Cazares M.D.;  Location: SURGERY [facility];  Service:    • HYSTERECTOMY  "ROBOTIC  10/19/2016    Procedure: HYSTERECTOMY ROBOTIC Total with BILATERAL SALPINGO-OOPHORECTOMY;  Surgeon: Lenny Cazares M.D.;  Location: SURGERY [facility];  Service:    • ANTERIOR AND POSTERIOR REPAIR  10/19/2016    Procedure: POSTERIOR REPAIR;  Surgeon: Lenny Cazares M.D.;  Location: SURGERY [facility];  Service:    • VAGINAL SUSPENSION  10/19/2016    Procedure: VAGINAL SUSPENSION Uterosacral, and  McCalls Culdoplasty;  Surgeon: Lenny Cazares M.D.;  Location: SURGERY [facility];  Service:    • TONSILLECTOMY  4/18/2014    Performed by Nirali Dos Santos M.D. at SURGERY [facility]   • OTHER SURGICAL PROCEDURE  1989    Dr Doss; Neck superficial exploration   • TONSILLECTOMY  1959   • OTHER      Throat exploration   • OTHER ORTHOPEDIC SURGERY Left     mass excision       ROS:  Denies any Headache,Chest pain,  Shortness of breath,  Abdominal pain, Changes of bowel or bladder, Lower ext edema, Fevers, Nights sweats, Weight Changes, Focal weakness or numbness.  All other systems are negative.    /60 (BP Location: Left arm, Patient Position: Sitting, BP Cuff Size: Adult)   Pulse 74   Temp 36.8 °C (98.3 °F) (Temporal)   Resp 16   Ht 1.6 m (5' 3\")   Wt 67.4 kg (148 lb 8 oz)   SpO2 96%   BMI 26.31 kg/m²      Constitutional: Alert, no distress, well-groomed.  Skin: Warm, dry, good turgor, no rashes in visible areas.  Eye: Equal, round and reactive, conjunctiva clear, lids normal.  ENMT: Lips without lesions, good dentition, moist mucous membranes.  Neck: Trachea midline, no masses, no thyromegaly.  Respiratory: Unlabored respiratory effort, no cough.  Abdomen: Soft, no gross masses.  MSK: Normal gait, moves all extremities.  No lower extremity edema.  Good distal pulses bilaterally, 2+.  No calf tenderness.  Neuro: Grossly non-focal. No cranial nerve deficit. Strength and sensation intact.   Psych: Alert and oriented x3, normal affect and mood.      Assessment and Plan.   66 " y.o. female presenting with the following.     1. Left leg pain  Stable.  Continue to monitor.  Check CMP.  Recommend compression hose.  - Comp Metabolic Panel; Future    2. Encounter for screening mammogram for malignant neoplasm of breast    - MA-SCREENING MAMMO BILAT W/CAD; Future    3. Weight gain    - Comp Metabolic Panel; Future  - THYROID PANEL WITH TSH    4. Vitamin D deficiency    - VITAMIN D,25 HYDROXY; Future      My total time spent caring for the patient on the day of the encounter was 30 minutes.   This does not include time spent on separately billable procedures/tests.

## 2022-02-15 NOTE — PROGRESS NOTES
Annual Health Assessment Questions:    1.  Are you currently engaging in any exercise or physical activity? No    2.  How would you describe your mood or emotional well-being today? Sad    3.  Have you had any falls in the last year? No    4.  Have you noticed any problems with your balance or had difficulty walking? No    5.  In the last six months have you experienced any leakage of urine? No    6. DPA/Advanced Directive: Patient does not have an Advanced Directive.  A packet and workshop information was given on Advanced Directives.  Left

## 2022-03-03 ENCOUNTER — HOSPITAL ENCOUNTER (OUTPATIENT)
Dept: RADIOLOGY | Facility: MEDICAL CENTER | Age: 67
End: 2022-03-03
Attending: INTERNAL MEDICINE
Payer: COMMERCIAL

## 2022-03-03 DIAGNOSIS — Z12.31 ENCOUNTER FOR SCREENING MAMMOGRAM FOR MALIGNANT NEOPLASM OF BREAST: ICD-10-CM

## 2022-03-03 PROCEDURE — 77063 BREAST TOMOSYNTHESIS BI: CPT

## 2022-03-09 ENCOUNTER — HOSPITAL ENCOUNTER (OUTPATIENT)
Dept: LAB | Facility: MEDICAL CENTER | Age: 67
End: 2022-03-09
Attending: INTERNAL MEDICINE
Payer: MEDICARE

## 2022-03-09 DIAGNOSIS — M79.605 LEFT LEG PAIN: ICD-10-CM

## 2022-03-09 DIAGNOSIS — E55.9 VITAMIN D DEFICIENCY: ICD-10-CM

## 2022-03-09 DIAGNOSIS — R63.5 WEIGHT GAIN: ICD-10-CM

## 2022-03-09 PROBLEM — R55 BRIEF LOSS OF CONSCIOUSNESS: Status: ACTIVE | Noted: 2022-03-09

## 2022-03-09 PROBLEM — M50.30 DEGENERATIVE DISC DISEASE, CERVICAL: Status: ACTIVE | Noted: 2022-03-09

## 2022-03-09 LAB
25(OH)D3 SERPL-MCNC: 53 NG/ML (ref 30–100)
ALBUMIN SERPL BCP-MCNC: 4.7 G/DL (ref 3.2–4.9)
ALBUMIN/GLOB SERPL: 1.7 G/DL
ALP SERPL-CCNC: 83 U/L (ref 30–99)
ALT SERPL-CCNC: 17 U/L (ref 2–50)
ANION GAP SERPL CALC-SCNC: 10 MMOL/L (ref 7–16)
AST SERPL-CCNC: 21 U/L (ref 12–45)
BILIRUB SERPL-MCNC: 0.4 MG/DL (ref 0.1–1.5)
BUN SERPL-MCNC: 14 MG/DL (ref 8–22)
CALCIUM SERPL-MCNC: 10.3 MG/DL (ref 8.5–10.5)
CHLORIDE SERPL-SCNC: 104 MMOL/L (ref 96–112)
CO2 SERPL-SCNC: 26 MMOL/L (ref 20–33)
CREAT SERPL-MCNC: 0.69 MG/DL (ref 0.5–1.4)
FASTING STATUS PATIENT QL REPORTED: NORMAL
GLOBULIN SER CALC-MCNC: 2.7 G/DL (ref 1.9–3.5)
GLUCOSE SERPL-MCNC: 88 MG/DL (ref 65–99)
POTASSIUM SERPL-SCNC: 4.8 MMOL/L (ref 3.6–5.5)
PROT SERPL-MCNC: 7.4 G/DL (ref 6–8.2)
SODIUM SERPL-SCNC: 140 MMOL/L (ref 135–145)
T4 FREE SERPL-MCNC: 1.19 NG/DL (ref 0.93–1.7)
TSH SERPL DL<=0.005 MIU/L-ACNC: 2.23 UIU/ML (ref 0.38–5.33)

## 2022-03-09 PROCEDURE — 84439 ASSAY OF FREE THYROXINE: CPT

## 2022-03-09 PROCEDURE — 82306 VITAMIN D 25 HYDROXY: CPT

## 2022-03-09 PROCEDURE — 36415 COLL VENOUS BLD VENIPUNCTURE: CPT

## 2022-03-09 PROCEDURE — 84443 ASSAY THYROID STIM HORMONE: CPT

## 2022-03-09 PROCEDURE — 80053 COMPREHEN METABOLIC PANEL: CPT

## 2022-03-16 ENCOUNTER — TELEPHONE (OUTPATIENT)
Dept: MEDICAL GROUP | Facility: PHYSICIAN GROUP | Age: 67
End: 2022-03-16
Payer: MEDICARE

## 2022-03-16 NOTE — TELEPHONE ENCOUNTER
ESTABLISHED PATIENT PRE-VISIT PLANNING     Patient was NOT contacted to complete PVP.     Note: Patient will not be contacted if there is no indication to call.     1.  Reviewed notes from the last few office visits within the medical group: Yes    2.  If any orders were placed at last visit or intended to be done for this visit (i.e. 6 mos follow-up), do we have Results/Consult Notes?         •  Labs - Labs ordered, completed on 3/9/2022 and results are in chart.  Note: If patient appointment is for lab review and patient did not complete labs, check with provider if OK to reschedule patient until labs completed.       •  Imaging - Imaging ordered, completed and results are in chart.       •  Referrals - No referrals were ordered at last office visit.    3. Is this appointment scheduled as a Hospital Follow-Up? No    4.  Immunizations were updated in Epic using Reconcile Outside Information activity? Yes    5.  Patient is due for the following Health Maintenance Topics:   There are no preventive care reminders to display for this patient.      6.  AHA (Pulse8) form printed for Provider? No, already completed

## 2022-03-17 ENCOUNTER — OFFICE VISIT (OUTPATIENT)
Dept: MEDICAL GROUP | Facility: PHYSICIAN GROUP | Age: 67
End: 2022-03-17
Payer: MEDICARE

## 2022-03-17 VITALS
RESPIRATION RATE: 16 BRPM | BODY MASS INDEX: 26.76 KG/M2 | OXYGEN SATURATION: 96 % | DIASTOLIC BLOOD PRESSURE: 50 MMHG | TEMPERATURE: 98.6 F | HEART RATE: 75 BPM | SYSTOLIC BLOOD PRESSURE: 90 MMHG | HEIGHT: 62 IN | WEIGHT: 145.4 LBS

## 2022-03-17 DIAGNOSIS — R41.3 MEMORY LOSS: ICD-10-CM

## 2022-03-17 DIAGNOSIS — R40.4 ALTERED CONSCIOUSNESS: ICD-10-CM

## 2022-03-17 DIAGNOSIS — M50.30 DEGENERATIVE DISC DISEASE, CERVICAL: ICD-10-CM

## 2022-03-17 DIAGNOSIS — R63.5 WEIGHT GAIN: ICD-10-CM

## 2022-03-17 DIAGNOSIS — Z13.79 GENETIC SCREENING: ICD-10-CM

## 2022-03-17 PROBLEM — R73.9 HYPERGLYCEMIA: Status: RESOLVED | Noted: 2021-07-13 | Resolved: 2022-03-17

## 2022-03-17 PROCEDURE — 99214 OFFICE O/P EST MOD 30 MIN: CPT | Performed by: INTERNAL MEDICINE

## 2022-03-17 ASSESSMENT — FIBROSIS 4 INDEX: FIB4 SCORE: 1.89
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
## 2022-04-12 ENCOUNTER — TELEPHONE (OUTPATIENT)
Dept: MEDICAL GROUP | Facility: PHYSICIAN GROUP | Age: 67
End: 2022-04-12
Payer: MEDICARE

## 2022-04-12 NOTE — TELEPHONE ENCOUNTER
Patient would like your opinion regarding her sleep quality report. Patient report has been scanned into her media. Called patient to schedule earlier appointment on 5/12.

## 2022-05-12 ENCOUNTER — APPOINTMENT (OUTPATIENT)
Dept: MEDICAL GROUP | Facility: PHYSICIAN GROUP | Age: 67
End: 2022-05-12
Payer: MEDICARE

## 2022-05-17 ENCOUNTER — OFFICE VISIT (OUTPATIENT)
Dept: MEDICAL GROUP | Facility: PHYSICIAN GROUP | Age: 67
End: 2022-05-17
Payer: MEDICARE

## 2022-05-17 VITALS
WEIGHT: 146.3 LBS | TEMPERATURE: 97 F | RESPIRATION RATE: 15 BRPM | OXYGEN SATURATION: 94 % | BODY MASS INDEX: 25.92 KG/M2 | HEART RATE: 69 BPM | HEIGHT: 63 IN | DIASTOLIC BLOOD PRESSURE: 56 MMHG | SYSTOLIC BLOOD PRESSURE: 102 MMHG

## 2022-05-17 DIAGNOSIS — R06.81 APNEA: ICD-10-CM

## 2022-05-17 PROCEDURE — 99213 OFFICE O/P EST LOW 20 MIN: CPT | Performed by: INTERNAL MEDICINE

## 2022-05-17 ASSESSMENT — FIBROSIS 4 INDEX: FIB4 SCORE: 1.89

## 2022-05-17 NOTE — PROGRESS NOTES
CC: Follow-up sleep study.    HPI:  Karuna presents with the following    1. Apnea  Patient recently completed a quality sleep study per her dentist.  Results showed apnea index to be moderate with fragmented sleep.  Patient is a bit concerned and she has been having increased fatigue, daytime somnolence.  Patient denies any morning headache, hypertension or snoring.  Patient states that she will mostly feel rested upon awakening.  Patient admits to mild depression and anxiety given the current political climate.      Patient Active Problem List    Diagnosis Date Noted   • Weight gain 03/17/2022   • Altered consciousness 03/17/2022   • BMI 26.0-26.9,adult 03/09/2022   • Degenerative disc disease, cervical 03/09/2022   • Brief loss of consciousness 03/09/2022   • Vitamin D deficiency 02/15/2022   • Dyslipidemia 07/13/2021   • S/P laparoscopy 06/16/2021   • Pelvic floor relaxation 05/20/2021   • Sternal pain 04/27/2021   • Left leg pain 04/27/2021   • Hx of total hysterectomy with removal of both tubes and ovaries 03/05/2021   • Epigastric pain 01/22/2021   • History of hysterectomy 01/12/2021   • Generalized abdominal pain 09/17/2020   • Memory loss 09/15/2020   • Osteopenia 01/22/2018   • Menopausal and postmenopausal disorder        Current Outpatient Medications   Medication Sig Dispense Refill   • Cyanocobalamin (B-12) 500 MCG Tab Take 500 mcg by mouth every day.     • Tetrahydrozoline-Zn Sulfate (EYE DROPS AR OP) Administer  into affected eye(s) every day.     • Cholecalciferol (D3-1000) 1000 UNIT Cap      • Ascorbic Acid (VITAMIN C CR) 1500 MG Tab CR Take 1,500 mg by mouth every day.     • vitamin E 200 UNIT capsule Take 400 Units by mouth every day.     • Copper 5 MG Tab Take 3 mg by mouth every day.     • Zinc 10 MG Lozenge Spray 9 mg in mouth/throat.     • Omega-3 350 MG CAPSULE DELAYED RELEASE Take 360 mg by mouth every day.     • Lutein 10 MG Tab Take 5 mg by mouth every day.     • Multiple  Vitamins-Minerals (MULTIVITAMIN ADULT PO) Take  by mouth.     • Biotin 2500 MCG Cap Take  by mouth.     • calcium carbonate (TUMS) 500 MG Chew Tab Take 500 mg by mouth every day.       No current facility-administered medications for this visit.         Allergies as of 2022 - Reviewed 2022   Allergen Reaction Noted   • Hydrocodone Unspecified 2021   • Other environmental Swelling 2021   • Prednisone Swelling 2020   • Other drug Unspecified 2016        Social History     Socioeconomic History   • Marital status:      Spouse name: Not on file   • Number of children: Not on file   • Years of education: Not on file   • Highest education level: Some college, no degree   Occupational History   • Not on file   Tobacco Use   • Smoking status: Former Smoker     Packs/day: 1.00     Years: 21.00     Pack years: 21.00     Types: Cigarettes     Start date: 1970     Quit date: 1991     Years since quittin.3   • Smokeless tobacco: Never Used   Vaping Use   • Vaping Use: Never used   Substance and Sexual Activity   • Alcohol use: Yes     Alcohol/week: 0.6 oz     Types: 1 Standard drinks or equivalent per week     Comment: 2 per week   • Drug use: No   • Sexual activity: Never     Partners: Male     Comment: , one daughter,  (now retired).   Other Topics Concern   • Not on file   Social History Narrative   • Not on file     Social Determinants of Health     Financial Resource Strain: Low Risk    • Difficulty of Paying Living Expenses: Not hard at all   Food Insecurity: No Food Insecurity   • Worried About Running Out of Food in the Last Year: Never true   • Ran Out of Food in the Last Year: Never true   Transportation Needs: No Transportation Needs   • Lack of Transportation (Medical): No   • Lack of Transportation (Non-Medical): No   Physical Activity: Inactive   • Days of Exercise per Week: 0 days   • Minutes of Exercise per Session: 0 min   Stress: No  Stress Concern Present   • Feeling of Stress : Only a little   Social Connections: Socially Integrated   • Frequency of Communication with Friends and Family: More than three times a week   • Frequency of Social Gatherings with Friends and Family: More than three times a week   • Attends [employer]ism Services: More than 4 times per year   • Active Member of Clubs or Organizations: Yes   • Attends Club or Organization Meetings: Patient refused   • Marital Status:    Intimate Partner Violence: Not on file   Housing Stability: Low Risk    • Unable to Pay for Housing in the Last Year: No   • Number of Places Lived in the Last Year: 1   • Unstable Housing in the Last Year: No       Family History   Problem Relation Age of Onset   • Psychiatric Illness Mother         Alzheimer's   • Cancer Maternal Aunt         breast cancer       Past Surgical History:   Procedure Laterality Date   • HI LAP,DIAGNOSTIC ABDOMEN  6/16/2021    Procedure: diagnostic laparoscopy;  Surgeon: Librado Rosenberg M.D.;  Location: SURGERY SAME DAY [facility];  Service: Gynecology   • MASS EXCISION ORTHO Left 7/17/2020    Procedure: EXCISION, MASS-FOOT soft tissue TUMOR;  Surgeon: Lenny Silverman D.P.M.;  Location: SURGERY SAME DAY [facility];  Service: Podiatry   • CYSTOSCOPY  10/19/2016    Procedure: CYSTOSCOPY;  Surgeon: Lenny Cazares M.D.;  Location: SURGERY [facility];  Service:    • HYSTERECTOMY ROBOTIC  10/19/2016    Procedure: HYSTERECTOMY ROBOTIC Total with BILATERAL SALPINGO-OOPHORECTOMY;  Surgeon: Lenny Cazares M.D.;  Location: [facility];  Service:    • ANTERIOR AND POSTERIOR REPAIR  10/19/2016    Procedure: POSTERIOR REPAIR;  Surgeon: Lenny Cazares M.D.;  Location: SURGERY [facility];  Service:    • VAGINAL SUSPENSION  10/19/2016    Procedure: VAGINAL SUSPENSION Uterosacral, and  McCalls Culdoplasty;  Surgeon: Lenny Cazares M.D.;  Location: SURGERY [facility];  Service:    • TONSILLECTOMY   "4/18/2014    Performed by Nirali Dos Santos M.D. at SURGERY [facility] ORS   • OTHER SURGICAL PROCEDURE  1989    Dr Doss; Neck superficial exploration   • TONSILLECTOMY  1959   • OTHER      Throat exploration   • OTHER ORTHOPEDIC SURGERY Left     mass excision       ROS:  Denies any Headache,Chest pain,  Shortness of breath,  Abdominal pain, Changes of bowel or bladder, Lower ext edema, Fevers, Nights sweats, Weight Changes, Focal weakness or numbness.  All other systems are negative.    /56 (BP Location: Right arm, Patient Position: Sitting, BP Cuff Size: Adult)   Pulse 69   Temp 36.1 °C (97 °F) (Temporal)   Resp 15   Ht 1.6 m (5' 3\")   Wt 66.4 kg (146 lb 4.8 oz)   SpO2 94%   BMI 25.92 kg/m²      Constitutional: Alert, no distress, well-groomed.  Skin: Warm, dry, good turgor, no rashes in visible areas.  Eye: Equal, round and reactive, conjunctiva clear, lids normal.  ENMT: Lips without lesions, good dentition, moist mucous membranes.  Neck: Trachea midline, no masses, no thyromegaly.  Respiratory: Unlabored respiratory effort, no cough.  Abdomen: Soft, no gross masses.  MSK: Normal gait, moves all extremities.  Neuro: Grossly non-focal. No cranial nerve deficit. Strength and sensation intact.   Psych: Alert and oriented x3, normal affect and mood.    Assessment and Plan.   66 y.o. female presenting with the following.     1. Apnea  Sleep quality study reviewed with patient and attached for referral.  - Referral to Pulmonary and Sleep Medicine      My total time spent caring for the patient on the day of the encounter was 20 minutes.   This does not include time spent on separately billable procedures/tests.    "

## 2022-08-04 ENCOUNTER — PATIENT MESSAGE (OUTPATIENT)
Dept: MEDICAL GROUP | Facility: PHYSICIAN GROUP | Age: 67
End: 2022-08-04
Payer: MEDICARE

## 2022-08-04 DIAGNOSIS — I73.9 PERIPHERAL VASCULAR DISEASE, UNSPECIFIED (HCC): ICD-10-CM

## 2022-09-06 ENCOUNTER — APPOINTMENT (OUTPATIENT)
Dept: SLEEP MEDICINE | Facility: MEDICAL CENTER | Age: 67
End: 2022-09-06
Payer: MEDICARE

## 2022-11-11 ENCOUNTER — OFFICE VISIT (OUTPATIENT)
Dept: SLEEP MEDICINE | Facility: MEDICAL CENTER | Age: 67
End: 2022-11-11
Payer: MEDICARE

## 2022-11-11 VITALS
HEART RATE: 74 BPM | RESPIRATION RATE: 14 BRPM | BODY MASS INDEX: 25.69 KG/M2 | HEIGHT: 63 IN | DIASTOLIC BLOOD PRESSURE: 60 MMHG | SYSTOLIC BLOOD PRESSURE: 120 MMHG | OXYGEN SATURATION: 93 % | WEIGHT: 145 LBS

## 2022-11-11 DIAGNOSIS — G47.30 SLEEP DISORDER BREATHING: Primary | ICD-10-CM

## 2022-11-11 DIAGNOSIS — G47.34 NOCTURNAL HYPOXIA: ICD-10-CM

## 2022-11-11 DIAGNOSIS — G47.19 EXCESSIVE DAYTIME SLEEPINESS: ICD-10-CM

## 2022-11-11 PROCEDURE — 99203 OFFICE O/P NEW LOW 30 MIN: CPT | Performed by: STUDENT IN AN ORGANIZED HEALTH CARE EDUCATION/TRAINING PROGRAM

## 2022-11-11 ASSESSMENT — FIBROSIS 4 INDEX: FIB4 SCORE: 1.89

## 2022-11-14 ENCOUNTER — DOCUMENTATION (OUTPATIENT)
Dept: HEALTH INFORMATION MANAGEMENT | Facility: OTHER | Age: 67
End: 2022-11-14
Payer: MEDICARE

## 2022-11-15 ENCOUNTER — OFFICE VISIT (OUTPATIENT)
Dept: MEDICAL GROUP | Facility: PHYSICIAN GROUP | Age: 67
End: 2022-11-15
Payer: MEDICARE

## 2022-11-15 VITALS
WEIGHT: 146.1 LBS | TEMPERATURE: 98 F | OXYGEN SATURATION: 97 % | SYSTOLIC BLOOD PRESSURE: 100 MMHG | BODY MASS INDEX: 25.89 KG/M2 | HEART RATE: 69 BPM | RESPIRATION RATE: 15 BRPM | DIASTOLIC BLOOD PRESSURE: 52 MMHG | HEIGHT: 63 IN

## 2022-11-15 DIAGNOSIS — R53.83 OTHER FATIGUE: ICD-10-CM

## 2022-11-15 DIAGNOSIS — M79.89 LEFT LEG SWELLING: ICD-10-CM

## 2022-11-15 DIAGNOSIS — R41.3 MEMORY LOSS: ICD-10-CM

## 2022-11-15 DIAGNOSIS — R63.5 WEIGHT GAIN: ICD-10-CM

## 2022-11-15 DIAGNOSIS — Z13.220 SCREENING CHOLESTEROL LEVEL: ICD-10-CM

## 2022-11-15 DIAGNOSIS — Z12.31 ENCOUNTER FOR SCREENING MAMMOGRAM FOR MALIGNANT NEOPLASM OF BREAST: ICD-10-CM

## 2022-11-15 DIAGNOSIS — G47.30 SLEEP APNEA, UNSPECIFIED TYPE: ICD-10-CM

## 2022-11-15 DIAGNOSIS — E55.9 VITAMIN D DEFICIENCY: ICD-10-CM

## 2022-11-15 PROBLEM — R10.13 EPIGASTRIC PAIN: Status: RESOLVED | Noted: 2021-01-22 | Resolved: 2022-11-15

## 2022-11-15 PROBLEM — R07.89 STERNAL PAIN: Status: RESOLVED | Noted: 2021-04-27 | Resolved: 2022-11-15

## 2022-11-15 PROCEDURE — 99214 OFFICE O/P EST MOD 30 MIN: CPT | Performed by: INTERNAL MEDICINE

## 2022-11-15 RX ORDER — LYSINE HCL 500 MG
500 TABLET ORAL DAILY
COMMUNITY

## 2022-11-15 ASSESSMENT — FIBROSIS 4 INDEX: FIB4 SCORE: 1.89

## 2022-11-15 NOTE — PROGRESS NOTES
CC: Memory loss, leg edema, sleep apnea    HPI:  Karuna presents with the following    1. Encounter for screening mammogram for malignant neoplasm of breast  Due for mammogram.    2. Sleep apnea, unspecified type  Patient was seen and evaluated by pulmonology for possible sleep apnea.  Patient completed an Varney Sleepiness Scale with results of 15, moderate severity.  Patient is scheduled for overnight PSG with pulmonology.    3. Memory loss  3/17/2022:Patient recently completed an assessment with [facility], had concerns about ongoing memory loss.  Patient was followed by Dr. Damon her neurologist in the past for similar issues.  Patient mostly complains of forgetting people's names.  Patient completed a brain MRI and PET scan which were within normal limits.  SPEP, vitamin B12, sed rate and TSH all within normal limits.  No concern for progressive Alzheimer's disease.  Patient not taking Aricept or Namenda.  Patient diagnosed with mild cognitive impairment.  No MoCA results available.    11/50/22:.  Patient continues to have moderate amounts of short-term memory loss.  Patient completed genetic testing with Alzheimer's in her family history.    4. Weight gain  Patient's current weight is 146 BMI of 25.88.  Patient would like to continue weight especially around her middle.  She has been a bit more active since her penitentiary however has gained 15 pounds since then.  She is currently on a kefir and cantaloupe diet.  She is not on an exercise routine.    5. Left leg swelling  Patient has ongoing intermittent left leg swelling.  Patient has been seen and evaluated by [facility].  Patient was advised to wear compression hose and an ultrasound of her lower extremities was ordered.  She also started taking lysine which has helped with her swelling.        Patient Active Problem List    Diagnosis Date Noted    Weight gain 03/17/2022    Altered consciousness 03/17/2022    BMI 26.0-26.9,adult 03/09/2022     Degenerative disc disease, cervical 03/09/2022    Brief loss of consciousness 03/09/2022    Vitamin D deficiency 02/15/2022    Dyslipidemia 07/13/2021    S/P laparoscopy 06/16/2021    Pelvic floor relaxation 05/20/2021    Left leg pain 04/27/2021    Hx of total hysterectomy with removal of both tubes and ovaries 03/05/2021    History of hysterectomy 01/12/2021    Generalized abdominal pain 09/17/2020    Memory loss 09/15/2020    Osteopenia 01/22/2018    Menopausal and postmenopausal disorder        Current Outpatient Medications   Medication Sig Dispense Refill    lysine 500 MG Tab Take 1 Tablet by mouth every day.      Cyanocobalamin (B-12) 500 MCG Tab Take 500 mcg by mouth every day.      Tetrahydrozoline-Zn Sulfate (EYE DROPS AR OP) Administer  into affected eye(s) every day.      Cholecalciferol (D3-1000) 1000 UNIT Cap       Ascorbic Acid (VITAMIN C CR) 1500 MG Tab CR Take 1,500 mg by mouth every day.      vitamin E 200 UNIT capsule Take 2 Capsules by mouth every day.      Copper 5 MG Tab Take 0.6 Tablets by mouth every day.      Zinc 10 MG Lozenge Dissolve 0.9 Lozenges in the mouth.      Omega-3 350 MG CAPSULE DELAYED RELEASE Take 360 mg by mouth every day.      Lutein 10 MG Tab Take 0.5 Tablets by mouth every day.      Multiple Vitamins-Minerals (MULTIVITAMIN ADULT PO) Take  by mouth.      Biotin 2500 MCG Cap Take  by mouth.      calcium carbonate (TUMS) 500 MG Chew Tab Chew 1 Tablet every day.       No current facility-administered medications for this visit.         Allergies as of 11/15/2022 - Reviewed 11/15/2022   Allergen Reaction Noted    Hydrocodone Unspecified 05/21/2021    Other environmental Swelling 05/21/2021    Prednisone Swelling 11/16/2020    Other drug Unspecified 09/29/2016        Social History     Socioeconomic History    Marital status:      Spouse name: Not on file    Number of children: Not on file    Years of education: Not on file    Highest education level: Some college, no  degree   Occupational History    Not on file   Tobacco Use    Smoking status: Former     Packs/day: 1.00     Years: 28.00     Pack years: 28.00     Types: Cigarettes     Start date: 1970     Quit date: 1993     Years since quittin.8    Smokeless tobacco: Never    Tobacco comments:     No cigarettes since I quit smoking-cold turkey   Vaping Use    Vaping Use: Never used   Substance and Sexual Activity    Alcohol use: Yes     Alcohol/week: 0.6 oz     Types: 1 Glasses of wine per week     Comment: 1 a week on average-sometimes none a week    Drug use: No    Sexual activity: Never     Partners: Male     Comment: , one daughter,  (now retired).   Other Topics Concern    Not on file   Social History Narrative    Not on file     Social Determinants of Health     Financial Resource Strain: Low Risk     Difficulty of Paying Living Expenses: Not hard at all   Food Insecurity: No Food Insecurity    Worried About Running Out of Food in the Last Year: Never true    Ran Out of Food in the Last Year: Never true   Transportation Needs: No Transportation Needs    Lack of Transportation (Medical): No    Lack of Transportation (Non-Medical): No   Physical Activity: Inactive    Days of Exercise per Week: 0 days    Minutes of Exercise per Session: 0 min   Stress: No Stress Concern Present    Feeling of Stress : Only a little   Social Connections: Socially Integrated    Frequency of Communication with Friends and Family: More than three times a week    Frequency of Social Gatherings with Friends and Family: More than three times a week    Attends Caodaism Services: More than 4 times per year    Active Member of Clubs or Organizations: Yes    Attends Club or Organization Meetings: More than 4 times per year    Marital Status:    Intimate Partner Violence: Not on file   Housing Stability: Low Risk     Unable to Pay for Housing in the Last Year: No    Number of Places Lived in the Last Year: 1     Unstable Housing in the Last Year: No       Family History   Problem Relation Age of Onset    Psychiatric Illness Mother         Alzheimer's    Alzheimer's Disease Mother          of Alzheimer's Disease    Cancer Maternal Aunt         Skin, Breast,Liver    Cancer Maternal Aunt         Cancer    Cancer Maternal Uncle         Blood Cancer       Past Surgical History:   Procedure Laterality Date    DE LAP,DIAGNOSTIC ABDOMEN  2021    Procedure: diagnostic laparoscopy;  Surgeon: Librado Rosenberg M.D.;  Location: SURGERY SAME DAY [facility];  Service: Gynecology    MASS EXCISION ORTHO Left 2020    Procedure: EXCISION, MASS-FOOT soft tissue TUMOR;  Surgeon: Lenny Silverman D.P.M.;  Location: SURGERY SAME DAY [facility];  Service: Podiatry    CYSTOSCOPY  10/19/2016    Procedure: CYSTOSCOPY;  Surgeon: Lenny Cazares M.D.;  Location: SURGERY [facility];  Service:     HYSTERECTOMY ROBOTIC  10/19/2016    Procedure: HYSTERECTOMY ROBOTIC Total with BILATERAL SALPINGO-OOPHORECTOMY;  Surgeon: Lenny Cazares M.D.;  Location: SURGERY [facility];  Service:     ANTERIOR AND POSTERIOR REPAIR  10/19/2016    Procedure: POSTERIOR REPAIR;  Surgeon: Lenny Cazares M.D.;  Location: SURGERY [facility];  Service:     VAGINAL SUSPENSION  10/19/2016    Procedure: VAGINAL SUSPENSION Uterosacral, and  McCalls Culdoplasty;  Surgeon: Lenny Cazares M.D.;  Location: SURGERY [facility];  Service:     TONSILLECTOMY  2014    Performed by Nirali Dos Santos M.D. at [facility]    OTHER SURGICAL PROCEDURE      Dr Doss; Neck superficial exploration    TONSILLECTOMY      HYSTERECTOMY LAPAROSCOPY      OTHER      Throat exploration    OTHER ORTHOPEDIC SURGERY Left     mass excision       ROS: Positive ROS per HPI.  Denies any Headache,Chest pain,  Shortness of breath,  Abdominal pain, Changes of bowel or bladder, Lower ext edema, Fevers, Nights sweats, Weight Changes, Focal weakness or  "numbness.  All other systems are negative.    /52   Pulse 69   Temp 36.7 °C (98 °F) (Temporal)   Resp 15   Ht 1.6 m (5' 3\")   Wt 66.3 kg (146 lb 1.6 oz)   SpO2 97%   BMI 25.88 kg/m²      Constitutional: Alert, no distress, well-groomed.  Skin: Warm, dry, good turgor, no rashes in visible areas.  Eye: Equal, round and reactive, conjunctiva clear, lids normal.  ENMT: Lips without lesions, good dentition, moist mucous membranes.  Neck: Trachea midline, no masses, no thyromegaly.  Respiratory: Unlabored respiratory effort, no cough.  Abdomen: Soft, no gross masses.  MSK: Normal gait, moves all extremities.  No lower extremity edema.  Neuro: Grossly non-focal. No cranial nerve deficit. Strength and sensation intact.   Psych: Alert and oriented x3, normal affect and mood.    Assessment and Plan.   66 y.o. female presenting with the following.     1. Encounter for screening mammogram for malignant neoplasm of breast    - MA-SCREENING MAMMO BILAT W/TOMOSYNTHESIS W/CAD; Future    2. Sleep apnea, unspecified type  Stable.  Patient to complete lower extremity ultrasound, recommend compression hose.  Follow-up with [facility] in December.    3. Memory loss  Patient to complete PSG for possible cause of MCI.  - Comp Metabolic Panel; Future  - CBC WITH DIFFERENTIAL; Future    4. Weight gain  Counseled patient on diet lifestyle modifications.  Increase physical activity to 30 minutes of walking per day.    5. Left leg swelling  Continue current recommendations per vein and vascular.  Complete lower extremity ultrasound.  Compression hose.    6. Other fatigue    - TSH; Future    7. Screening cholesterol level    - Lipid Profile; Future    8. Vitamin D deficiency    - VITAMIN D,25 HYDROXY (DEFICIENCY); Future    My total time spent caring for the patient on the day of the encounter was 30 minutes.   This does not include time spent on separately billable procedures/tests.      "

## 2022-11-28 DIAGNOSIS — Z12.31 ENCOUNTER FOR SCREENING MAMMOGRAM FOR MALIGNANT NEOPLASM OF BREAST: ICD-10-CM

## 2022-11-30 ENCOUNTER — APPOINTMENT (RX ONLY)
Dept: URBAN - METROPOLITAN AREA CLINIC 6 | Facility: CLINIC | Age: 67
Setting detail: DERMATOLOGY
End: 2022-11-30

## 2022-11-30 DIAGNOSIS — D18.0 HEMANGIOMA: ICD-10-CM

## 2022-11-30 DIAGNOSIS — L82.1 OTHER SEBORRHEIC KERATOSIS: ICD-10-CM

## 2022-11-30 DIAGNOSIS — L738 OTHER SPECIFIED DISEASES OF HAIR AND HAIR FOLLICLES: ICD-10-CM

## 2022-11-30 DIAGNOSIS — D22 MELANOCYTIC NEVI: ICD-10-CM

## 2022-11-30 DIAGNOSIS — L73.9 FOLLICULAR DISORDER, UNSPECIFIED: ICD-10-CM

## 2022-11-30 DIAGNOSIS — L81.4 OTHER MELANIN HYPERPIGMENTATION: ICD-10-CM

## 2022-11-30 DIAGNOSIS — Z71.89 OTHER SPECIFIED COUNSELING: ICD-10-CM

## 2022-11-30 DIAGNOSIS — L663 OTHER SPECIFIED DISEASES OF HAIR AND HAIR FOLLICLES: ICD-10-CM

## 2022-11-30 PROBLEM — D22.5 MELANOCYTIC NEVI OF TRUNK: Status: ACTIVE | Noted: 2022-11-30

## 2022-11-30 PROBLEM — L02.425 FURUNCLE OF RIGHT LOWER LIMB: Status: ACTIVE | Noted: 2022-11-30

## 2022-11-30 PROBLEM — D22.71 MELANOCYTIC NEVI OF RIGHT LOWER LIMB, INCLUDING HIP: Status: ACTIVE | Noted: 2022-11-30

## 2022-11-30 PROBLEM — D18.01 HEMANGIOMA OF SKIN AND SUBCUTANEOUS TISSUE: Status: ACTIVE | Noted: 2022-11-30

## 2022-11-30 PROBLEM — D23.5 OTHER BENIGN NEOPLASM OF SKIN OF TRUNK: Status: ACTIVE | Noted: 2022-11-30

## 2022-11-30 PROCEDURE — ? OBSERVATION

## 2022-11-30 PROCEDURE — ? COUNSELING

## 2022-11-30 PROCEDURE — 99213 OFFICE O/P EST LOW 20 MIN: CPT

## 2022-11-30 PROCEDURE — ? SUNSCREEN TREATMENT REGIMEN

## 2022-11-30 PROCEDURE — ? DEFER

## 2022-11-30 ASSESSMENT — LOCATION SIMPLE DESCRIPTION DERM
LOCATION SIMPLE: LEFT FOREHEAD
LOCATION SIMPLE: CHEST
LOCATION SIMPLE: ABDOMEN
LOCATION SIMPLE: LEFT HAND
LOCATION SIMPLE: UPPER BACK
LOCATION SIMPLE: RIGHT HAND
LOCATION SIMPLE: RIGHT CALF
LOCATION SIMPLE: RIGHT PLANTAR SURFACE

## 2022-11-30 ASSESSMENT — LOCATION DETAILED DESCRIPTION DERM
LOCATION DETAILED: UPPER STERNUM
LOCATION DETAILED: RIGHT MEDIAL PLANTAR MIDFOOT
LOCATION DETAILED: EPIGASTRIC SKIN
LOCATION DETAILED: SUPERIOR THORACIC SPINE
LOCATION DETAILED: PERIUMBILICAL SKIN
LOCATION DETAILED: LEFT INFERIOR FOREHEAD
LOCATION DETAILED: LEFT RADIAL DORSAL HAND
LOCATION DETAILED: RIGHT PROXIMAL CALF
LOCATION DETAILED: RIGHT RADIAL DORSAL HAND
LOCATION DETAILED: LEFT ULNAR DORSAL HAND

## 2022-11-30 ASSESSMENT — LOCATION ZONE DERM
LOCATION ZONE: FEET
LOCATION ZONE: FACE
LOCATION ZONE: LEG
LOCATION ZONE: HAND
LOCATION ZONE: TRUNK

## 2022-11-30 NOTE — PROCEDURE: DEFER
X Size Of Lesion In Cm (Optional): 0
Introduction Text (Please End With A Colon): s/p pain clinic, nerve block x 1, declines further intervention, \"can live with it\" for now
Detail Level: Detailed

## 2022-12-04 ENCOUNTER — SLEEP STUDY (OUTPATIENT)
Dept: SLEEP MEDICINE | Facility: MEDICAL CENTER | Age: 67
End: 2022-12-04
Attending: STUDENT IN AN ORGANIZED HEALTH CARE EDUCATION/TRAINING PROGRAM
Payer: MEDICARE

## 2022-12-04 DIAGNOSIS — G47.19 EXCESSIVE DAYTIME SLEEPINESS: ICD-10-CM

## 2022-12-04 DIAGNOSIS — G47.34 NOCTURNAL HYPOXIA: ICD-10-CM

## 2022-12-04 DIAGNOSIS — G47.30 SLEEP DISORDER BREATHING: ICD-10-CM

## 2022-12-04 PROCEDURE — 95811 POLYSOM 6/>YRS CPAP 4/> PARM: CPT | Performed by: STUDENT IN AN ORGANIZED HEALTH CARE EDUCATION/TRAINING PROGRAM

## 2022-12-05 NOTE — PROCEDURES
MONTAGE: Standard  STUDY TYPE: Split Night    RECORDING TECHNIQUE:   After the scalp was prepared, gold plated electrodes were applied to the scalp according to the International 10-20 System. EEG (electroencephalogram) was continuously monitored from the O1-M2, O2-M1, C3-M2, C4-M1, F3-M2, and F4-M1. EOGs (electrooculograms) were monitored by electrodes placed at the left and right outer canthi. Chin EMG (electromyogram) was monitored by electrodes placed on the mentalis and sub-mentalis muscles. Nasal and oral airflow were monitored using a triple port thermocouple as well as oronasal pressure transducer. Respiratory effort was measured by inductive plethysmography technology employing abdominal and thoracic belts. Blood oxygen saturation and pulse were monitored by pulse oximetry. Heart rhythm was monitored by surface electrocardiogram. Leg EMG was studied using surface electrodes placed on left and right anterior tibialis. A microphone was used to monitor tracheal sounds and snoring. Body position was monitored and documented by technician observation.   SCORING CRITERIA:   A modification of the AASM manual for scoring of sleep and associated events was used. Obstructive apneas were scored by cessation of airflow for at least 10 seconds with continuing respiratory effort. Central apneas were scored by cessation of airflow for at least 10 seconds with no respiratory effort. Hypopneas were scored by a 30% or more reduction in airflow for at least 10 seconds accompanied by arterial oxygen desaturation of 3% or an arousal. For CMS (Medicare) patients, per AASM rule 1B, hypopneas are scored by 30% with mild reduction in airflow for at least 10 seconds accompanied by arterial saturation decreased at 4%.    DIAGNOSTIC  Study start time was 08:53:08 PM. Diagnostic recording time was 191 minutes with a total sleep time of 125 minutes resulting in a sleep efficiency of 65.54%%. Sleep latency from the start of the study was  04 minutes and the latency from sleep to REM was 112 minutes. In total,31 arousals were scored for an arousal index of 14.8.  Respiratory:  There were a total of 1 apneas consisting of 1 obstructive apneas, 0 mixed apneas, and 0 central apneas. A total of 31 hypopneas were scored. The apnea index was 0.48 per hour and the hypopnea index was 14.82 per hour resulting in an overall AHI of 15.30. AHI during rem was 60.0 and AHI while supine was 14.94.  Oximetry:  There was a mean oxygen saturation of 92.0%. The minimum oxygen saturation during NREM sleep was82.0% and in REM was 85.0%. Time spent during sleep with oxygen saturations <88% was 16.6 minutes.   Cardiac:  The highest heart rate seen while awake was 103 BPM while the highest heart rate during sleep was 76 BPM with an average sleeping heart rate of 56 BPM.  Limb Movements:  There were a total of 0 PLMs during sleep, which resulted in a PLM index of 0.0. There were 0 PLMs associated with arousals which resulted in a PLMS arousal index of 0.0.    TREATMENT:  Treatment recording time was 5h 53.0m (353 minutes) with a total sleep time of 5h 12.0m (312 minutes) resulting in a sleep efficiency of 88.4%. Sleep latency from the start of treatment was 08 minutes and REM latency from sleep onset was 1h 13.5m. The patient had 49 arousals in total for an arousal index of 9.4.  Respiratory:   There were 7 apneas in total consisting of 3 obstructive apneas, 4 central apneas, and 0 mixed apneas for an apnea index of 1.35. The patient had 6 hypopneas in total, which resulted in a hypopnea index of 1.15. The overall AHI was 2.50, with a REM AHI of 2.50, and a supine AHI of 2.99.   Oximetry:  The mean SaO2 during treatment was 93.0%. The minimum oxygen saturation in NREM was79.0 % and in REM was 88.0%. Patient spent 6.2 minutes of TST with SaO2 <88%.  Cardiac:  The highest heart rate during sleep was 76 BPM with an average sleeping heart rate of 55BPM.  Limb Movements:  There  were a total of 0 PLMS during titration sleep time that resulted in an index of 0.0. There were 0 PLMS associated with arousals. This resulted in a PLM arousal index of 0.0.    Titration: CPAP was tried from 5-7cm H2O.  This was a fully attended sleep study. This test was technically adequate. This patient was titrated on CPAP starting at 5 cm of water pressure. Patient was titrated up to 7 cm of water pressure. Patient did best at 6 cm of water pressure. Patient spent 172 minutes at that pressure and their AHI was 0.7 which is considered treated obstructive sleep apnea.     Impression:  1.  Moderate obstructive sleep apnea with overall AHI of 15.3  2.  Mild nocturnal hypoxia likely secondary to untreated sleep apnea minimum oxygen saturation 82% during diagnostic portion, time at or below 88% saturation 16.6 minutes  3.  Respiratory events improved with CPAP therapy  4.  Supine REM sleep was seen while on CPAP pressure of 6    Recommendations:  I recommend auto CPAP 6-7 cm .  Patient used a small/medium Ashley mask during night of study.     I also recommend 30 day compliance download to assess the efficacy to the recommended pressure, measure leak, apnea hypopnea index and compliance for further outpatient monitoring and management of PAP therapy. In some cases alternative treatment options may be proven effective in resolving sleep apnea. These options include upper airway surgery, the use of a dental orthotic, weight loss, or positional therapy. Clinical correlation is required. In general patients with sleep apnea are advised to avoid alcohol, sedatives and not to operate a motor vehicle while drowsy.  Untreated sleep apnea increases the risk for cardiovascular and neurovascular disease.

## 2022-12-20 ENCOUNTER — OFFICE VISIT (OUTPATIENT)
Dept: SLEEP MEDICINE | Facility: MEDICAL CENTER | Age: 67
End: 2022-12-20
Payer: MEDICARE

## 2022-12-20 VITALS
HEART RATE: 74 BPM | RESPIRATION RATE: 16 BRPM | WEIGHT: 146 LBS | HEIGHT: 63 IN | BODY MASS INDEX: 25.87 KG/M2 | DIASTOLIC BLOOD PRESSURE: 54 MMHG | SYSTOLIC BLOOD PRESSURE: 98 MMHG | OXYGEN SATURATION: 95 %

## 2022-12-20 DIAGNOSIS — G47.33 OSA (OBSTRUCTIVE SLEEP APNEA): ICD-10-CM

## 2022-12-20 PROCEDURE — 99214 OFFICE O/P EST MOD 30 MIN: CPT | Performed by: NURSE PRACTITIONER

## 2022-12-20 RX ORDER — AMOXICILLIN 500 MG/1
500 CAPSULE ORAL
COMMUNITY
Start: 2022-12-02 | End: 2023-01-11

## 2022-12-20 RX ORDER — AMOXICILLIN 500 MG/1
CAPSULE ORAL
COMMUNITY
Start: 2022-12-02 | End: 2023-01-11

## 2022-12-20 RX ORDER — IBUPROFEN 800 MG/1
TABLET ORAL
COMMUNITY
Start: 2022-12-02 | End: 2023-10-19

## 2022-12-20 ASSESSMENT — FIBROSIS 4 INDEX: FIB4 SCORE: 1.91

## 2022-12-20 NOTE — PATIENT INSTRUCTIONS
"Ordering autoCpap from the following:    DME : [facility]  Address: [address] [place] [address] [address], [place] [address]  Phone: [phone]    Once you receive your new PAP machine from the [employer] company you're referred to, we must see you back for an office visit between 30-90 days of you using the machine to review compliance.  If you were ordered an ASV machine, we need to see you in office no sooner than your 60th day on therapy.     This is a very important time frame for insurance purposes. If you do not follow up with our office for compliance your insurance may not continue to pay for the machine. Also if you do not use the machine for at least 4 hours each night, you may be deemed \"Incompliant\", in which case the insurance may also not continue to pay for the machine.    If you are incompliant, you may have to surrender your machine to the [employer] company and start the process over if you wish to continue therapy after that. Meaning a new office consult and new sleep studies.    For your first visit back with our office, please bring the whole machine with the power cord. We will download the compliance off the SD card in the machine, and are able to make changes if need be in office. Some machines have a modem and we can access the data wirelessly, if this is the case please make sure the DME company grants us access to your machine.  For all follow up appointments after that, you will only need to bring the SD card to the appointment.    If you are having any issues with the mask, you have a 30 day window to exchange and try something else with your DME company.  If you are having issues with the pressure, please call our office at [phone].  These issues can cause you to not be able to use machine appropriately, there for make you incompliant.    Please call our office if you have any questions.    Thank you, [facility].  [phone]    "

## 2022-12-20 NOTE — PROGRESS NOTES
Chief Complaint   Patient presents with    Apnea     SS results       HPI:  Karuna Reid is a 67 y.o. year old female here today for follow-up on sleep study results.  Last seen 11/11/2022 by Sourav Lyon MD.  Patient presented with chief complaints of excessive daytime sleepiness, snoring, but no witnessed apneas or gasping.  Previous Stanton sleepiness score of 15.    Split-night sleep study(12/4/2022):  1.  Moderate obstructive sleep apnea with overall AHI of 15.3. Mild nocturnal hypoxia likely secondary to untreated sleep apnea minimum oxygen saturation 82% during diagnostic portion, time at or below 88% saturation 16.6 minutes. Respiratory events improved with CPAP therapy Supine REM sleep was seen while on CPAP pressure of 6.     Recommendations:  I recommend auto CPAP 6-7 cm .  Patient used a small/medium Ashley mask during night of study.    ROS: As per HPI and otherwise negative if not stated.    Past Medical History:   Diagnosis Date    Abdominal pain     Altered consciousness 03/17/2022    Arthritis     Bowel habit changes     constipation    Chest tightness     Chickenpox     Cough     Daytime sleepiness     Dental disorder     implant     Dyslipidemia 07/13/2021    Frequent urination     GERD (gastroesophageal reflux disease)     Influenza     Menopausal and postmenopausal disorder     Muscle disorder     Osteoporosis     Peripheral vascular disease, unspecified (HCC) 07/13/2021    Shortness of breath     Swelling of lower extremity     Tonsillitis     Urinary bladder disorder     Vitamin D deficiency 02/15/2022    Wears glasses     Weight gain 03/17/2022       Past Surgical History:   Procedure Laterality Date    TX LAP,DIAGNOSTIC ABDOMEN  06/16/2021    Procedure: diagnostic laparoscopy;  Surgeon: Librado Rosenberg M.D.;  Location: SURGERY SAME DAY [facility];  Service: Gynecology    MASS EXCISION ORTHO Left 07/17/2020    Procedure: EXCISION, MASS-FOOT soft tissue TUMOR;  Surgeon: Lenny Silverman,  "LEELEE;  Location: SURGERY SAME DAY [facility];  Service: Podiatry    CYSTOSCOPY  10/19/2016    Procedure: CYSTOSCOPY;  Surgeon: Lenny Cazares M.D.;  Location: SURGERY [facility];  Service:     HYSTERECTOMY ROBOTIC  10/19/2016    Procedure: HYSTERECTOMY ROBOTIC Total with BILATERAL SALPINGO-OOPHORECTOMY;  Surgeon: Lenny Cazares M.D.;  Location: SURGERY [facility];  Service:     ANTERIOR AND POSTERIOR REPAIR  10/19/2016    Procedure: POSTERIOR REPAIR;  Surgeon: Lenny Cazares M.D.;  Location: SURGERY [facility];  Service:     VAGINAL SUSPENSION  10/19/2016    Procedure: VAGINAL SUSPENSION Uterosacral, and  McCalls Culdoplasty;  Surgeon: Lenny Cazares M.D.;  Location: SURGERY [facility];  Service:     TONSILLECTOMY  2014    Performed by Nirali Dos Santos M.D. at SURGERY [facility]    OTHER SURGICAL PROCEDURE      Dr Doss; Neck superficial exploration    TONSILLECTOMY      HYSTERECTOMY LAPAROSCOPY      OTHER      Throat exploration    OTHER ORTHOPEDIC SURGERY Left     mass excision       Family History   Problem Relation Age of Onset    Psychiatric Illness Mother         Alzheimer's    Alzheimer's Disease Mother          of Alzheimer's Disease    Cancer Maternal Aunt         Skin, Breast,Liver    Cancer Maternal Aunt         Cancer    Cancer Maternal Uncle         Blood Cancer       Allergies as of 2022 - Reviewed 2022   Allergen Reaction Noted    Hydrocodone Unspecified 2021    Other environmental Swelling 2021    Prednisone Swelling 2020    Other drug Unspecified 2016        Vitals:  BP (!) 98/54 (BP Location: Left arm, Patient Position: Sitting, BP Cuff Size: Adult)   Pulse 74   Resp 16   Ht 1.6 m (5' 3\")   Wt 66.2 kg (146 lb)   SpO2 95%     Current medications as of today   Current Outpatient Medications   Medication Sig Dispense Refill    ibuprofen (MOTRIN) 800 MG Tab       lysine 500 MG Tab Take 1 Tablet by mouth " every day.      Cyanocobalamin (B-12) 500 MCG Tab Take 500 mcg by mouth every day.      Tetrahydrozoline-Zn Sulfate (EYE DROPS AR OP) Administer  into affected eye(s) every day.      Cholecalciferol (D3-1000) 1000 UNIT Cap       Ascorbic Acid (VITAMIN C CR) 1500 MG Tab CR Take 1,500 mg by mouth every day.      vitamin E 200 UNIT capsule Take 2 Capsules by mouth every day.      Copper 5 MG Tab Take 0.6 Tablets by mouth every day.      Zinc 10 MG Lozenge Dissolve 0.9 Lozenges in the mouth.      Omega-3 350 MG CAPSULE DELAYED RELEASE Take 360 mg by mouth every day.      Lutein 10 MG Tab Take 0.5 Tablets by mouth every day.      Multiple Vitamins-Minerals (MULTIVITAMIN ADULT PO) Take  by mouth.      Biotin 2500 MCG Cap Take  by mouth.      calcium carbonate (TUMS) 500 MG Chew Tab Chew 1 Tablet every day.      amoxicillin (AMOXIL) 500 MG Cap Take 500 mg by mouth. (Patient not taking: Reported on 12/20/2022)      amoxicillin (AMOXIL) 500 MG Cap  (Patient not taking: Reported on 12/20/2022)       No current facility-administered medications for this visit.         Physical Exam:   Gen:           Alert and oriented, No apparent distress. Mood and affect appropriate, normal interaction with examiner.  Eyes:          PERRL, EOM intact, sclere white, conjunctive moist.  Ears:          Not examined.   Hearing:     Grossly intact.  Nose:          Normal, no lesions or deformities.  Dentition:    Good dentition.  Oropharynx:   Tongue normal, posterior pharynx without erythema or exudate  Neck:        Supple, trachea midline, no masses.  Respiratory Effort: No intercostal retractions or use of accessory muscles.   Lung Auscultation:      Clear to auscultation bilaterally; no rales, rhonchi or wheezing.  CV:            Regular rate and rhythm. No murmurs, rubs or gallops.  Abd:           Not examined.   Lymphadenopathy: Not examined.  Gait and Station: Normal.  Digits and Nails: No clubbing, cyanosis, petechiae, or nodes.   Cranial  Nerves: II-XII grossly intact.  Skin:        No rashes, lesions or ulcers noted.               Ext:           No cyanosis or edema.      Assessment:  1. FREDDY (obstructive sleep apnea)  DME CPAP            Plan:   I reviewed with the patient the pathophysiology of obstructive sleep apnea, as well as potential cardiac and neurologic risks associated with untreated sleep apnea including CAD, HTN, pulmonary arterial hypertension, cardiac arrhythmias, heart attack or stroke.  FREDDY patient's have increased risk of motor vehicle accidents, DM type II, chronic kidney disease and nonalcoholic liver disease. She is cautioned against driving while sleepy for her safety and safety of others on the road. We reviewed treatment modalities for sleep apnea including CPAP/BiPAP therapy, ENT referral, dental appliance.      Is amendable to CPAP trial.  Order placed for CPAP at recommended settings.  Patient recommended to follow-up between day 31 and 90 for 30-day compliance review.  Advised to send message via [facility] or telephone call if any concerns before follow-up.  Please note that this dictation was created using voice recognition software. I have made every reasonable attempt to correct obvious errors, but it is possible there are errors of grammar and possibly content that I did not discover before finalizing the note.

## 2023-01-11 PROBLEM — M65.331 TRIGGER FINGER, RIGHT MIDDLE FINGER: Status: ACTIVE | Noted: 2023-01-11

## 2023-02-02 ENCOUNTER — HOSPITAL ENCOUNTER (OUTPATIENT)
Dept: LAB | Facility: MEDICAL CENTER | Age: 68
End: 2023-02-02
Attending: INTERNAL MEDICINE
Payer: MEDICARE

## 2023-02-02 DIAGNOSIS — R41.3 MEMORY LOSS: ICD-10-CM

## 2023-02-02 DIAGNOSIS — R53.83 OTHER FATIGUE: ICD-10-CM

## 2023-02-02 DIAGNOSIS — Z13.220 SCREENING CHOLESTEROL LEVEL: ICD-10-CM

## 2023-02-02 DIAGNOSIS — E55.9 VITAMIN D DEFICIENCY: ICD-10-CM

## 2023-02-02 LAB
25(OH)D3 SERPL-MCNC: 42 NG/ML (ref 30–100)
ALBUMIN SERPL BCP-MCNC: 4.3 G/DL (ref 3.2–4.9)
ALBUMIN/GLOB SERPL: 1.7 G/DL
ALP SERPL-CCNC: 74 U/L (ref 30–99)
ALT SERPL-CCNC: 13 U/L (ref 2–50)
ANION GAP SERPL CALC-SCNC: 10 MMOL/L (ref 7–16)
AST SERPL-CCNC: 16 U/L (ref 12–45)
BASOPHILS # BLD AUTO: 0.5 % (ref 0–1.8)
BASOPHILS # BLD: 0.03 K/UL (ref 0–0.12)
BILIRUB SERPL-MCNC: 0.4 MG/DL (ref 0.1–1.5)
BUN SERPL-MCNC: 16 MG/DL (ref 8–22)
CALCIUM ALBUM COR SERPL-MCNC: 9.8 MG/DL (ref 8.5–10.5)
CALCIUM SERPL-MCNC: 10 MG/DL (ref 8.5–10.5)
CHLORIDE SERPL-SCNC: 105 MMOL/L (ref 96–112)
CHOLEST SERPL-MCNC: 193 MG/DL (ref 100–199)
CO2 SERPL-SCNC: 23 MMOL/L (ref 20–33)
CREAT SERPL-MCNC: 0.73 MG/DL (ref 0.5–1.4)
EOSINOPHIL # BLD AUTO: 0.17 K/UL (ref 0–0.51)
EOSINOPHIL NFR BLD: 3 % (ref 0–6.9)
ERYTHROCYTE [DISTWIDTH] IN BLOOD BY AUTOMATED COUNT: 43.2 FL (ref 35.9–50)
FASTING STATUS PATIENT QL REPORTED: NORMAL
GFR SERPLBLD CREATININE-BSD FMLA CKD-EPI: 89 ML/MIN/1.73 M 2
GLOBULIN SER CALC-MCNC: 2.6 G/DL (ref 1.9–3.5)
GLUCOSE SERPL-MCNC: 99 MG/DL (ref 65–99)
HCT VFR BLD AUTO: 44.7 % (ref 37–47)
HDLC SERPL-MCNC: 58 MG/DL
HGB BLD-MCNC: 15.2 G/DL (ref 12–16)
IMM GRANULOCYTES # BLD AUTO: 0.01 K/UL (ref 0–0.11)
IMM GRANULOCYTES NFR BLD AUTO: 0.2 % (ref 0–0.9)
LDLC SERPL CALC-MCNC: 111 MG/DL
LYMPHOCYTES # BLD AUTO: 2.12 K/UL (ref 1–4.8)
LYMPHOCYTES NFR BLD: 37.9 % (ref 22–41)
MCH RBC QN AUTO: 31.2 PG (ref 27–33)
MCHC RBC AUTO-ENTMCNC: 34 G/DL (ref 33.6–35)
MCV RBC AUTO: 91.8 FL (ref 81.4–97.8)
MONOCYTES # BLD AUTO: 0.5 K/UL (ref 0–0.85)
MONOCYTES NFR BLD AUTO: 8.9 % (ref 0–13.4)
NEUTROPHILS # BLD AUTO: 2.77 K/UL (ref 2–7.15)
NEUTROPHILS NFR BLD: 49.5 % (ref 44–72)
NRBC # BLD AUTO: 0 K/UL
NRBC BLD-RTO: 0 /100 WBC
PLATELET # BLD AUTO: 185 K/UL (ref 164–446)
PMV BLD AUTO: 11.1 FL (ref 9–12.9)
POTASSIUM SERPL-SCNC: 4 MMOL/L (ref 3.6–5.5)
PROT SERPL-MCNC: 6.9 G/DL (ref 6–8.2)
RBC # BLD AUTO: 4.87 M/UL (ref 4.2–5.4)
SODIUM SERPL-SCNC: 138 MMOL/L (ref 135–145)
TRIGL SERPL-MCNC: 119 MG/DL (ref 0–149)
TSH SERPL DL<=0.005 MIU/L-ACNC: 2.83 UIU/ML (ref 0.38–5.33)
WBC # BLD AUTO: 5.6 K/UL (ref 4.8–10.8)

## 2023-02-02 PROCEDURE — 80061 LIPID PANEL: CPT

## 2023-02-02 PROCEDURE — 36415 COLL VENOUS BLD VENIPUNCTURE: CPT

## 2023-02-02 PROCEDURE — 85025 COMPLETE CBC W/AUTO DIFF WBC: CPT

## 2023-02-02 PROCEDURE — 80053 COMPREHEN METABOLIC PANEL: CPT

## 2023-02-02 PROCEDURE — 82306 VITAMIN D 25 HYDROXY: CPT

## 2023-02-02 PROCEDURE — 84443 ASSAY THYROID STIM HORMONE: CPT

## 2023-03-06 ENCOUNTER — HOSPITAL ENCOUNTER (OUTPATIENT)
Dept: RADIOLOGY | Facility: MEDICAL CENTER | Age: 68
End: 2023-03-06
Attending: INTERNAL MEDICINE
Payer: MEDICARE

## 2023-03-06 PROCEDURE — 77063 BREAST TOMOSYNTHESIS BI: CPT

## 2023-03-16 ENCOUNTER — OFFICE VISIT (OUTPATIENT)
Dept: SLEEP MEDICINE | Facility: MEDICAL CENTER | Age: 68
End: 2023-03-16
Attending: NURSE PRACTITIONER
Payer: MEDICARE

## 2023-03-16 VITALS
HEART RATE: 72 BPM | OXYGEN SATURATION: 95 % | DIASTOLIC BLOOD PRESSURE: 62 MMHG | BODY MASS INDEX: 25.52 KG/M2 | HEIGHT: 63 IN | SYSTOLIC BLOOD PRESSURE: 108 MMHG | RESPIRATION RATE: 16 BRPM | WEIGHT: 144 LBS

## 2023-03-16 DIAGNOSIS — G47.33 OSA (OBSTRUCTIVE SLEEP APNEA): ICD-10-CM

## 2023-03-16 PROCEDURE — 99212 OFFICE O/P EST SF 10 MIN: CPT | Performed by: NURSE PRACTITIONER

## 2023-03-16 PROCEDURE — 99213 OFFICE O/P EST LOW 20 MIN: CPT | Performed by: NURSE PRACTITIONER

## 2023-03-16 ASSESSMENT — FIBROSIS 4 INDEX: FIB4 SCORE: 1.63
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
## 2023-03-27 PROBLEM — G47.33 OSA ON CPAP: Status: ACTIVE | Noted: 2023-03-27

## 2023-03-28 ENCOUNTER — OFFICE VISIT (OUTPATIENT)
Dept: MEDICAL GROUP | Facility: PHYSICIAN GROUP | Age: 68
End: 2023-03-28
Payer: MEDICARE

## 2023-03-28 VITALS
OXYGEN SATURATION: 93 % | WEIGHT: 146.5 LBS | BODY MASS INDEX: 25.96 KG/M2 | RESPIRATION RATE: 12 BRPM | HEART RATE: 80 BPM | TEMPERATURE: 98.3 F | DIASTOLIC BLOOD PRESSURE: 60 MMHG | SYSTOLIC BLOOD PRESSURE: 112 MMHG | HEIGHT: 63 IN

## 2023-03-28 DIAGNOSIS — R22.1 LOCALIZED SWELLING, MASS AND LUMP, NECK: ICD-10-CM

## 2023-03-28 DIAGNOSIS — R41.3 MEMORY LOSS: ICD-10-CM

## 2023-03-28 DIAGNOSIS — Z00.00 ENCOUNTER FOR ANNUAL WELLNESS VISIT (AWV) IN MEDICARE PATIENT: ICD-10-CM

## 2023-03-28 DIAGNOSIS — G47.33 OSA ON CPAP: ICD-10-CM

## 2023-03-28 PROCEDURE — G0439 PPPS, SUBSEQ VISIT: HCPCS | Performed by: INTERNAL MEDICINE

## 2023-03-28 ASSESSMENT — ENCOUNTER SYMPTOMS: GENERAL WELL-BEING: GOOD

## 2023-03-28 ASSESSMENT — ACTIVITIES OF DAILY LIVING (ADL): BATHING_REQUIRES_ASSISTANCE: 0

## 2023-03-28 ASSESSMENT — FIBROSIS 4 INDEX: FIB4 SCORE: 1.63

## 2023-03-28 ASSESSMENT — PATIENT HEALTH QUESTIONNAIRE - PHQ9: CLINICAL INTERPRETATION OF PHQ2 SCORE: 0

## 2023-03-28 NOTE — PROGRESS NOTES
Chief Complaint   Patient presents with    Annual Exam     Annual       HPI:  Karuna Reid is a 67 y.o. here for Medicare Annual Wellness Visit     1. Encounter for annual wellness visit (AWV) in Medicare patient  Annual wellness visit  - Subsequent Annual Wellness Visit - Includes PPPS ()    2. FREDDY on CPAP  Patient followed up with pulmonology and diagnosed with moderately severe sleep apnea, patient has been on her CPAP machine for 1 month.  Patient noticed more increased energy and less fatigue.  Follow-up appointment with pulmonology in September.  - Subsequent Annual Wellness Visit - Includes PPPS ()    3. Memory loss  3/17/2022:[place] [facility] [place] concerns about ongoing memory loss.  Patient was followed by Dr. Damon her neurologist in the past for similar issues.  Patient mostly complains of forgetting people's names.  Patient completed a brain MRI and PET scan which were within normal limits.  SPEP, vitamin B12, sed rate and TSH all within normal limits.  No concern for progressive Alzheimer's disease.  Patient not taking Aricept or Namenda.  Patient diagnosed with mild cognitive impairment.  No MoCA results available.     11/50/22:.  Patient continues to have moderate amounts of short-term memory loss.  Patient completed genetic testing with Alzheimer's in her family history.    3/20/2023:.  Patient states that her mild cognitive impairment and mild memory loss has improved since using her CPAP machine.  Patient also takes vitamin B12 supplements.  - Subsequent Annual Wellness Visit - Includes PPPS ()    4. Localized swelling, mass and lump, neck  Patient states that she notices a lump on the left side of her neck.  Concerned that it might be a thyroid nodule or cyst or lymph node enlargement.  Patient denies any difficulty with swallowing, fevers or chills.  - US-SOFT TISSUES OF HEAD - NECK; Future  - Subsequent Annual Wellness Visit - Includes PPPS  ()      Patient Active Problem List    Diagnosis Date Noted    FREDDY on CPAP 03/27/2023    Trigger finger, right middle finger 01/11/2023    Weight gain 03/17/2022    Altered consciousness 03/17/2022    BMI 25.0-25.9,adult 03/09/2022    Degenerative disc disease, cervical 03/09/2022    Brief loss of consciousness 03/09/2022    Vitamin D deficiency 02/15/2022    Dyslipidemia 07/13/2021    S/P laparoscopy 06/16/2021    Pelvic floor relaxation 05/20/2021    Left leg pain 04/27/2021    Hx of total hysterectomy with removal of both tubes and ovaries 03/05/2021    History of hysterectomy 01/12/2021    Generalized abdominal pain 09/17/2020    Memory loss 09/15/2020    Osteopenia 01/22/2018    Menopausal and postmenopausal disorder        Current Outpatient Medications   Medication Sig Dispense Refill    ibuprofen (MOTRIN) 800 MG Tab  (Patient not taking: Reported on 3/27/2023)      lysine 500 MG Tab Take 1 Tablet by mouth every day.      Cyanocobalamin (B-12) 500 MCG Tab Take 500 mcg by mouth every day.      Tetrahydrozoline-Zn Sulfate (EYE DROPS AR OP) Administer  into affected eye(s) every day.      Cholecalciferol (D3-1000) 1000 UNIT Cap       Ascorbic Acid (VITAMIN C CR) 1500 MG Tab CR Take 1,500 mg by mouth every day.      vitamin E 200 UNIT capsule Take 2 Capsules by mouth every day.      Copper 5 MG Tab Take 0.6 Tablets by mouth every day.      Zinc 10 MG Lozenge Dissolve 0.9 Lozenges in the mouth.      Omega-3 350 MG CAPSULE DELAYED RELEASE Take 360 mg by mouth every day.      Lutein 10 MG Tab Take 0.5 Tablets by mouth every day.      Multiple Vitamins-Minerals (MULTIVITAMIN ADULT PO) Take  by mouth.      Biotin 2500 MCG Cap Take  by mouth.      calcium carbonate (TUMS) 500 MG Chew Tab Chew 1 Tablet every day.       No current facility-administered medications for this visit.          Current supplements as per medication list.     Allergies: Hydrocodone, Other environmental, Prednisone, and Other  drug    Current social contact/activities: Socializes with friends, goes to the gym    She  reports that she quit smoking about 30 years ago. Her smoking use included cigarettes. She started smoking about 53 years ago. She has a 28.00 pack-year smoking history. She has never used smokeless tobacco. She reports current alcohol use of about 0.6 oz per week. She reports that she does not use drugs.  Counseling given: Not Answered  Tobacco comments: No cigarettes since I quit smoking-cold turkey      ROS:    Gait: Uses no assistive device  Ostomy: No  Other tubes: No  Amputations: No  Chronic oxygen use: No  Last eye exam: Jan 4 2023    Wears hearing aids: No   : Denies any urinary leakage during the last 6 months    Screening:  Up-to-date with mammogram, bone density, colonoscopy and Pap smear.  Patient declines flu vaccine, COVID-vaccine.    Depression Screening  Little interest or pleasure in doing things?     Feeling down, depressed , or hopeless?    Trouble falling or staying asleep, or sleeping too much?     Feeling tired or having little energy?     Poor appetite or overeating?     Feeling bad about yourself - or that you are a failure or have let yourself or your family down?    Trouble concentrating on things, such as reading the newspaper or watching television?    Moving or speaking so slowly that other people could have noticed.  Or the opposite - being so fidgety or restless that you have been moving around a lot more than usual?     Thoughts that you would be better off dead, or of hurting yourself?     Patient Health Questionnaire Score:      If depressive symptoms identified deferred to follow up visit unless specifically addressed in assessment and plan.    Interpretation of PHQ-9 Total Score   Score Severity   1-4 No Depression   5-9 Mild Depression   10-14 Moderate Depression   15-19 Moderately Severe Depression   20-27 Severe Depression    Screening for Cognitive Impairment  Three Minute Recall  (daughter, jacob jaimes)  /3    Jerod clock face with all 12 numbers and set the hands to show 10 past 11.       Cognitive concerns identified deferred for follow up unless specifically addressed in assessment and plan.    Fall Risk Assessment  Has the patient had two or more falls in the last year or any fall with injury in the last year?       Safety Assessment  Throw rugs on floor.     Handrails on all stairs.     Good lighting in all hallways.     Difficulty hearing.     Patient counseled about all safety risks that were identified.    Functional Assessment ADLs  Are there any barriers preventing you from cooking for yourself or meeting nutritional needs?   .    Are there any barriers preventing you from driving safely or obtaining transportation?   .    Are there any barriers preventing you from using a telephone or calling for help?       Are there any barriers preventing you from shopping?   .    Are there any barriers preventing you from taking care of your own finances?       Are there any barriers preventing you from managing your medications?       Are there any barriers preventing you from showering, bathing or dressing yourself?      Are you currently engaging in any exercise or physical activity?   .    What is your perception of your health?      Advance Care Planning  Do you have an Advance Directive, Living Will, Durable Power of , or POLST?                   Health Maintenance Summary            Overdue - COVID-19 Vaccine (1) Overdue - never done      No completion history exists for this topic.              Overdue - IMM INFLUENZA (1) Overdue since 9/1/2022 09/24/2021  Imm Admin: Influenza Vaccine Adult HD    08/26/2020  Imm Admin: Influenza Vaccine Adult HD    10/05/2019  Imm Admin: Influenza Vaccine Quad Inj (Pf)    09/26/2018  Imm Admin: Influenza Vaccine Quad Inj (Pf)    10/11/2017  Imm Admin: Influenza Vaccine Quad Inj (Pf)    Only the first 5 history entries have been  loaded, but more history exists.              BONE DENSITY (Every 2 Years) Next due on 12/9/2023 12/09/2021  DS-BONE DENSITY STUDY (DEXA)    12/05/2019  DS-BONE DENSITY STUDY (DEXA)    12/06/2017  DS-BONE DENSITY STUDY (DEXA)    01/21/2008  DS-BONE DENSITY STUDY (DEXA)    10/19/2005  DS-BONE DENSITY STUDY (DEXA)              Annual Wellness Visit (Every 366 Days) Next due on 3/27/2024      03/27/2023  Level of Service: ANNUAL WELLNESS VISIT-INCLUDES PPPS SUBSEQUE*    03/09/2022  Level of Service: CO ANNUAL WELLNESS VISIT-INCLUDES PPPS SUBSEQUE*    07/13/2021  Level of Service: CO ANNUAL WELLNESS VISIT-INCLUDES PPPS SUBSEQUE*    09/08/2020  Initial Annual Wellness Visit - Includes PPPS ()    09/08/2020  Visit Dx: Medicare annual wellness visit, initial              MAMMOGRAM (Every 2 Years) Next due on 3/6/2025      03/06/2023  MA-SCREENING MAMMO BILAT W/TOMOSYNTHESIS W/CAD    03/03/2022  MA-SCREENING MAMMO BILAT W/TOMOSYNTHESIS W/CAD    03/01/2021  MA-SCREENING MAMMO BILAT W/TOMOSYNTHESIS W/CAD    02/29/2020  MA-SCREENING MAMMO BILAT W/TOMOSYNTHESIS W/CAD    02/23/2019  MA-SCREENING MAMMO BILAT W/TOMOSYNTHESIS W/CAD    Only the first 5 history entries have been loaded, but more history exists.              COLORECTAL CANCER SCREENING (COLONOSCOPY - Every 5 Years) Next due on 4/22/2026 04/22/2021  COLONOSCOPY (Done - 11/23/2020)    10/30/2015  AMB REFERRAL TO GI FOR COLONOSCOPY    09/09/2005  AMB REFERRAL TO GI FOR COLONOSCOPY              IMM DTaP/Tdap/Td Vaccine (3 - Td or Tdap) Next due on 7/14/2030 07/14/2020  Imm Admin: Tdap Vaccine    07/14/2010  Imm Admin: Tdap Vaccine              HEPATITIS C SCREENING  Completed      09/14/2019  HEP C VIRUS ANTIBODY              IMM ZOSTER VACCINES (Series Information) Completed      11/16/2020  Imm Admin: Zoster Vaccine Recombinant (RZV) (SHINGRIX)    08/26/2020  Imm Admin: Zoster Vaccine Recombinant (RZV) (SHINGRIX)    01/20/2014  Imm Admin: Zoster  Vaccine Live (ZVL) (Zostavax) - HISTORICAL DATA              IMM PNEUMOCOCCAL VACCINE: 65+ Years (Series Information) Completed      2021  Imm Admin: Pneumococcal polysaccharide vaccine (PPSV-23)    2020  Imm Admin: Pneumococcal Conjugate Vaccine (Prevnar/PCV-13)              IMM HEP B VACCINE (Series Information) Aged Out      No completion history exists for this topic.              IMM MENINGOCOCCAL ACWY VACCINE (Series Information) Aged Out      No completion history exists for this topic.              Discontinued - CERVICAL CANCER SCREENING  Discontinued        Frequency changed to Never automatically (Topic No Longer Applies)    2015  PAP IG (IMAGE GUIDED)                    Patient Care Team:  Felisa Danielle M.D. as PCP - General (Internal Medicine)  Felisa Danielle M.D. as PCP - [facility] Paneled (Internal Medicine)  Jorge Badillo M.D. (Neurology)      Social History     Tobacco Use    Smoking status: Former     Packs/day: 1.00     Years: 28.00     Pack years: 28.00     Types: Cigarettes     Start date: 1970     Quit date: 1993     Years since quittin.2    Smokeless tobacco: Never    Tobacco comments:     No cigarettes since I quit smoking-cold turkey   Vaping Use    Vaping Use: Never used   Substance Use Topics    Alcohol use: Yes     Alcohol/week: 0.6 oz     Types: 1 Glasses of wine per week     Comment: 1 a week on average-sometimes none a week    Drug use: No     Family History   Problem Relation Age of Onset    Psychiatric Illness Mother         Alzheimer's    Alzheimer's Disease Mother          of Alzheimer's Disease    Cancer Maternal Aunt         Skin, Breast,Liver    Cancer Maternal Aunt         Cancer    Cancer Maternal Uncle         Blood Cancer     She  has a past medical history of Abdominal pain, Altered consciousness (2022), Arthritis, Bowel habit changes, Chest tightness, Chickenpox, Cough, Daytime sleepiness, Dental disorder, Dyslipidemia  (07/13/2021), Frequent urination, GERD (gastroesophageal reflux disease), Influenza, Menopausal and postmenopausal disorder, Muscle disorder, Osteoporosis, Peripheral vascular disease, unspecified (HCC) (07/13/2021), Shortness of breath, Swelling of lower extremity, Tonsillitis, Urinary bladder disorder, Vitamin D deficiency (02/15/2022), Wears glasses, and Weight gain (03/17/2022).    She has no past medical history of Addisons disease ([facility]), Adrenal disorder (HCC), Allergy, Anemia, Anxiety, Blood transfusion without reported diagnosis, Cataract, Clotting disorder ([facility]), COPD (chronic obstructive pulmonary disease) ([facility]), Cushings syndrome (HCC), Depression, Diabetic neuropathy ([facility]), Glaucoma, Goiter, Head ache, HIV (human immunodeficiency virus infection) ([facility]), IBD (inflammatory bowel disease), Meningitis, Migraine, Parathyroid disorder ([facility]), Pituitary disease ([facility]), Pulmonary emphysema ([facility]), Sickle cell disease ([facility]), Stroke ([facility]), Thyroid disease, or Tuberculosis.   Past Surgical History:   Procedure Laterality Date    PB INCISE FINGER TENDON SHEATH Right 1/27/2023    Procedure: RIGHT MIDDLE FINGER TRIGGER RELEASE, REPAIRS AS INDICATED;  Surgeon: Leelee Jackson M.D.;  Location: [facility] [place];  Service: Orthopedics    PA LAP,DIAGNOSTIC ABDOMEN  06/16/2021    Procedure: diagnostic laparoscopy;  Surgeon: Librado Rosenberg M.D.;  Location: SURGERY SAME DAY [facility];  Service: Gynecology    MASS EXCISION ORTHO Left 07/17/2020    Procedure: EXCISION, MASS-FOOT soft tissue TUMOR;  Surgeon: CHRISTINA WisdomP.MMichael;  Location: SURGERY SAME DAY [facility];  Service: Podiatry    CYSTOSCOPY  10/19/2016    Procedure: CYSTOSCOPY;  Surgeon: Lenny Cazares M.D.;  Location: SURGERY [facility];  Service:     HYSTERECTOMY ROBOTIC  10/19/2016    Procedure: HYSTERECTOMY ROBOTIC Total with BILATERAL SALPINGO-OOPHORECTOMY;  Surgeon: Lenny Cazares M.D.;  Location: SURGERY [facility];   Service:     ANTERIOR AND POSTERIOR REPAIR  10/19/2016    Procedure: POSTERIOR REPAIR;  Surgeon: Lenny Cazares M.D.;  Location: SURGERY [facility];  Service:     VAGINAL SUSPENSION  10/19/2016    Procedure: VAGINAL SUSPENSION Uterosacral, and  McCalls Culdoplasty;  Surgeon: Lenny Cazares M.D.;  Location: SURGERY [facility];  Service:     TONSILLECTOMY  04/18/2014    Performed by Nirali Dos Santos M.D. at SURGERY [facility]    OTHER SURGICAL PROCEDURE  1989    Dr Doss; Neck superficial exploration    TONSILLECTOMY  1959    HYSTERECTOMY LAPAROSCOPY      OTHER      Throat exploration    OTHER ORTHOPEDIC SURGERY Left     mass excision       Exam:   There were no vitals taken for this visit. There is no height or weight on file to calculate BMI.    Hearing good.    Dentition good  Alert, oriented in no acute distress.  Eye contact is good, speech goal directed, affect calm    Constitutional: Alert, no distress.  Skin: Warm, dry, good turgor, no rashes in visible areas.  Eye: Equal, round and reactive, conjunctiva clear, lids normal.  ENMT: Lips without lesions, good dentition, oropharynx clear.  Neck: Trachea midline, no masses, no thyromegaly. No cervical or supraclavicular lymphadenopathy  Respiratory: Unlabored respiratory effort, lungs clear to auscultation, no wheezes, no ronchi.  Cardiovascular: Normal S1, S2, no murmur, no edema.  Abdomen: Soft, non-tender, no masses, no hepatosplenomegaly.  Psych: Alert and oriented x3, normal affect and mood.      Assessment and Plan. The following treatment and monitoring plan is recommended:    There are no diagnoses linked to this encounter.    1. Encounter for annual wellness visit (AWV) in Medicare patient  Annual wellness visit.  - Subsequent Annual Wellness Visit - Includes PPPS ()    2. FREDDY on CPAP  Stable.  Continue CPAP per pulmonology.  - Subsequent Annual Wellness Visit - Includes PPPS ()    3. Memory loss  Stable.  Continue to  monitor.  - Subsequent Annual Wellness Visit - Includes PPPS ()    4. Localized swelling, mass and lump, neck    - US-SOFT TISSUES OF HEAD - NECK; Future  - Subsequent Annual Wellness Visit - Includes PPPS ()    Services suggested: No services needed at this time  Health Care Screening: Age-appropriate preventive services recommended by USPTF and ACIP covered by Medicare were discussed today. Services ordered if indicated and agreed upon by the patient.  Referrals offered: Community-based lifestyle interventions to reduce health risks and promote self-management and wellness, fall prevention, nutrition, physical activity, tobacco-use cessation, weight loss, and mental health services as per orders if indicated.    Discussion today about general wellness and lifestyle habits:    Prevent falls and reduce trip hazards; Cautioned about securing or removing rugs.  Have a working fire alarm and carbon monoxide detector;   Engage in regular physical activity and social activities     Follow-up: No follow-ups on file.

## 2023-04-14 ENCOUNTER — APPOINTMENT (OUTPATIENT)
Dept: RADIOLOGY | Facility: MEDICAL CENTER | Age: 68
End: 2023-04-14
Attending: INTERNAL MEDICINE
Payer: MEDICARE

## 2023-04-14 DIAGNOSIS — R22.1 LOCALIZED SWELLING, MASS AND LUMP, NECK: ICD-10-CM

## 2023-04-14 PROCEDURE — 76536 US EXAM OF HEAD AND NECK: CPT

## 2023-04-24 ENCOUNTER — TELEPHONE (OUTPATIENT)
Dept: SLEEP MEDICINE | Facility: MEDICAL CENTER | Age: 68
End: 2023-04-24
Payer: MEDICARE

## 2023-04-24 NOTE — TELEPHONE ENCOUNTER
The patient called and stated that she never used a full face mask and she wants the dictation fixed. Thank you.
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
## 2023-05-19 ENCOUNTER — PATIENT MESSAGE (OUTPATIENT)
Dept: HEALTH INFORMATION MANAGEMENT | Facility: OTHER | Age: 68
End: 2023-05-19

## 2023-05-19 ENCOUNTER — DOCUMENTATION (OUTPATIENT)
Dept: HEALTH INFORMATION MANAGEMENT | Facility: OTHER | Age: 68
End: 2023-05-19
Payer: MEDICARE

## 2023-06-21 ENCOUNTER — APPOINTMENT (RX ONLY)
Dept: URBAN - METROPOLITAN AREA CLINIC 6 | Facility: CLINIC | Age: 68
Setting detail: DERMATOLOGY
End: 2023-06-21

## 2023-06-21 DIAGNOSIS — L70.8 OTHER ACNE: ICD-10-CM

## 2023-06-21 DIAGNOSIS — D22 MELANOCYTIC NEVI: ICD-10-CM

## 2023-06-21 PROBLEM — D48.5 NEOPLASM OF UNCERTAIN BEHAVIOR OF SKIN: Status: ACTIVE | Noted: 2023-06-21

## 2023-06-21 PROCEDURE — 11102 TANGNTL BX SKIN SINGLE LES: CPT

## 2023-06-21 PROCEDURE — 99212 OFFICE O/P EST SF 10 MIN: CPT | Mod: 25

## 2023-06-21 PROCEDURE — ? EXTRACTIONS

## 2023-06-21 PROCEDURE — ? BIOPSY BY SHAVE METHOD

## 2023-06-21 ASSESSMENT — LOCATION SIMPLE DESCRIPTION DERM
LOCATION SIMPLE: LEFT UPPER BACK
LOCATION SIMPLE: LEFT THIGH

## 2023-06-21 ASSESSMENT — LOCATION ZONE DERM
LOCATION ZONE: TRUNK
LOCATION ZONE: LEG

## 2023-06-21 ASSESSMENT — LOCATION DETAILED DESCRIPTION DERM
LOCATION DETAILED: LEFT INFERIOR MEDIAL UPPER BACK
LOCATION DETAILED: LEFT MEDIAL UPPER BACK
LOCATION DETAILED: LEFT POSTERIOR LATERAL DISTAL THIGH

## 2023-07-24 ENCOUNTER — APPOINTMENT (RX ONLY)
Dept: URBAN - METROPOLITAN AREA CLINIC 6 | Facility: CLINIC | Age: 68
Setting detail: DERMATOLOGY
End: 2023-07-24

## 2023-07-24 DIAGNOSIS — D22 MELANOCYTIC NEVI: ICD-10-CM

## 2023-07-24 PROBLEM — D22.72 MELANOCYTIC NEVI OF LEFT LOWER LIMB, INCLUDING HIP: Status: ACTIVE | Noted: 2023-07-24

## 2023-07-24 PROCEDURE — ? EXCISION

## 2023-07-24 PROCEDURE — 11401 EXC TR-EXT B9+MARG 0.6-1 CM: CPT

## 2023-07-24 PROCEDURE — 12032 INTMD RPR S/A/T/EXT 2.6-7.5: CPT

## 2023-07-24 ASSESSMENT — LOCATION DETAILED DESCRIPTION DERM: LOCATION DETAILED: LEFT POSTERIOR LATERAL DISTAL THIGH

## 2023-07-24 ASSESSMENT — LOCATION ZONE DERM: LOCATION ZONE: LEG

## 2023-07-24 ASSESSMENT — LOCATION SIMPLE DESCRIPTION DERM: LOCATION SIMPLE: LEFT THIGH

## 2023-07-24 NOTE — PROCEDURE: EXCISION
Medical Necessity Information: It is in your best interest to select a reason for this procedure from the list below. All of these items fulfill various CMS LCD requirements except lesion extends to a margin.
Include Z78.9 (Other Specified Conditions Influencing Health Status) As An Associated Diagnosis?: No
Medical Necessity Clause: This procedure was medically necessary because the lesion that was treated was:
Lab: [id number]
Lab Facility: 
Accession #: [id number]
Date Of Previous Biopsy (Optional): 06/21/2023
Referring Physician (Optional): Svetlana Alvarado
Surgeon (Optional): Dr. Bradley
Size Of Lesion In Cm: 0.8
X Size Of Lesion In Cm (Optional): 0.4
Size Of Margin In Cm: 0.1
Anesthesia Volume In Cc: 1
Eye Clamp Note Details: An eye clamp was used during the procedure.
Excision Method: Elliptical
Saucerization Depth: dermis and superficial adipose tissue
Repair Type: Intermediate
Intermediate / Complex Repair - Final Wound Length In Cm: 3.5
Suturegard Retention Suture: 2-0 Nylon
Retention Suture Bite Size: 3 mm
Length To Time In Minutes Device Was In Place: 10
Undermining Type: Entire Wound
Debridement Text: The wound edges were debrided prior to proceeding with the closure to facilitate wound healing.
Helical Rim Text: The closure involved the helical rim.
Vermilion Border Text: The closure involved the vermilion border.
Nostril Rim Text: The closure involved the nostril rim.
Retention Suture Text: Retention sutures were placed to support the closure and prevent dehiscence.
Primary Defect Length (In Cm): 0
Epidermal Closure Graft Donor Site (Optional): simple interrupted
Graft Donor Site Bandage (Optional-Leave Blank If You Don't Want In Note): Steri-strips and a pressure bandage were applied to the donor site.
Detail Level: Detailed
Excision Depth: adipose tissue
Scalpel Size: 15 blade
Anesthesia Type: 1% lidocaine with epinephrine and a 1:10 solution of 8.4% sodium bicarbonate
Additional Anesthesia Volume In Cc: 6
Hemostasis: Electrodesiccation
Estimated Blood Loss (Cc): minimal
Deep Sutures: 4-0 Monocryl
Dermal Closure: buried vertical mattress
Epidermal Sutures: 4-0 Caprosyn
Epidermal Closure: running subcuticular
Wound Care: Vaseline
Dressing: pressure dressing
Suturegard Intro: Intraoperative tissue expansion was performed, utilizing the SUTUREGARD device, in order to reduce wound tension.
Suturegard Body: The suture ends were repeatedly re-tightened and re-clamped to achieve the desired tissue expansion.
Hemigard Intro: Due to skin fragility and wound tension, it was decided to use HEMIGARD adhesive retention suture devices to permit a linear closure. The skin was cleaned and dried for a 6cm distance away from the wound. Excessive hair, if present, was removed to allow for adhesion.
Hemigard Postcare Instructions: The HEMIGARD strips are to remain completely dry for at least 5-7 days.
Positioning (Leave Blank If You Do Not Want): The patient was placed in a comfortable position exposing the surgical site.
Complex Repair Preamble Text (Leave Blank If You Do Not Want): Extensive wide undermining was performed.
Intermediate Repair Preamble Text (Leave Blank If You Do Not Want): Undermining was performed with blunt dissection.
Fusiform Excision Additional Text (Leave Blank If You Do Not Want): The margin was drawn around the clinically apparent lesion.  A fusiform shape was then drawn on the skin incorporating the lesion and margins.  Incisions were then made along these lines to the appropriate tissue plane and the lesion was extirpated.
Eliptical Excision Additional Text (Leave Blank If You Do Not Want): The margin was drawn around the clinically apparent lesion.  An elliptical shape was then drawn on the skin incorporating the lesion and margins.  Incisions were then made along these lines to the appropriate tissue plane and the lesion was extirpated.
Saucerization Excision Additional Text (Leave Blank If You Do Not Want): The margin was drawn around the clinically apparent lesion.  Incisions were then made along these lines, in a tangential fashion, to the appropriate tissue plane and the lesion was extirpated.
Slit Excision Additional Text (Leave Blank If You Do Not Want): A linear line was drawn on the skin overlying the lesion. An incision was made slowly until the lesion was visualized.  Once visualized, the lesion was removed with blunt dissection.
Excisional Biopsy Additional Text (Leave Blank If You Do Not Want): The margin was drawn around the clinically apparent lesion. An elliptical shape was then drawn on the skin incorporating the lesion and margins.  Incisions were then made along these lines to the appropriate tissue plane and the lesion was extirpated.
Perilesional Excision Additional Text (Leave Blank If You Do Not Want): The margin was drawn around the clinically apparent lesion. Incisions were then made along these lines to the appropriate tissue plane and the lesion was extirpated.
Repair Performed By Another Provider Text (Leave Blank If You Do Not Want): After the tissue was excised the defect was repaired by another provider.
No Repair - Repaired With Adjacent Surgical Defect Text (Leave Blank If You Do Not Want): After the excision the defect was repaired concurrently with another surgical defect which was in close approximation.
Adjacent Tissue Transfer Text: The defect edges were debeveled with a #15 scalpel blade.  Given the location of the defect and the proximity to free margins an adjacent tissue transfer was deemed most appropriate.  Using a sterile surgical marker, an appropriate flap was drawn incorporating the defect and placing the expected incisions within the relaxed skin tension lines where possible.    The area thus outlined was incised deep to adipose tissue with a #15 scalpel blade.  The skin margins were undermined to an appropriate distance in all directions utilizing iris scissors.
Advancement Flap (Single) Text: The defect edges were debeveled with a #15 scalpel blade.  Given the location of the defect and the proximity to free margins a single advancement flap was deemed most appropriate.  Using a sterile surgical marker, an appropriate advancement flap was drawn incorporating the defect and placing the expected incisions within the relaxed skin tension lines where possible.    The area thus outlined was incised deep to adipose tissue with a #15 scalpel blade.  The skin margins were undermined to an appropriate distance in all directions utilizing iris scissors.
Advancement Flap (Double) Text: The defect edges were debeveled with a #15 scalpel blade.  Given the location of the defect and the proximity to free margins a double advancement flap was deemed most appropriate.  Using a sterile surgical marker, the appropriate advancement flaps were drawn incorporating the defect and placing the expected incisions within the relaxed skin tension lines where possible.    The area thus outlined was incised deep to adipose tissue with a #15 scalpel blade.  The skin margins were undermined to an appropriate distance in all directions utilizing iris scissors.
Burow's Advancement Flap Text: The defect edges were debeveled with a #15 scalpel blade.  Given the location of the defect and the proximity to free margins a Burow's advancement flap was deemed most appropriate.  Using a sterile surgical marker, the appropriate advancement flap was drawn incorporating the defect and placing the expected incisions within the relaxed skin tension lines where possible.    The area thus outlined was incised deep to adipose tissue with a #15 scalpel blade.  The skin margins were undermined to an appropriate distance in all directions utilizing iris scissors.
Chonodrocutaneous Helical Advancement Flap Text: The defect edges were debeveled with a #15 scalpel blade.  Given the location of the defect and the proximity to free margins a chondrocutaneous helical advancement flap was deemed most appropriate.  Using a sterile surgical marker, the appropriate advancement flap was drawn incorporating the defect and placing the expected incisions within the relaxed skin tension lines where possible.    The area thus outlined was incised deep to adipose tissue with a #15 scalpel blade.  The skin margins were undermined to an appropriate distance in all directions utilizing iris scissors.
Crescentic Advancement Flap Text: The defect edges were debeveled with a #15 scalpel blade.  Given the location of the defect and the proximity to free margins a crescentic advancement flap was deemed most appropriate.  Using a sterile surgical marker, the appropriate advancement flap was drawn incorporating the defect and placing the expected incisions within the relaxed skin tension lines where possible.    The area thus outlined was incised deep to adipose tissue with a #15 scalpel blade.  The skin margins were undermined to an appropriate distance in all directions utilizing iris scissors.
A-T Advancement Flap Text: The defect edges were debeveled with a #15 scalpel blade.  Given the location of the defect, shape of the defect and the proximity to free margins an A-T advancement flap was deemed most appropriate.  Using a sterile surgical marker, an appropriate advancement flap was drawn incorporating the defect and placing the expected incisions within the relaxed skin tension lines where possible.    The area thus outlined was incised deep to adipose tissue with a #15 scalpel blade.  The skin margins were undermined to an appropriate distance in all directions utilizing iris scissors.
O-T Advancement Flap Text: The defect edges were debeveled with a #15 scalpel blade.  Given the location of the defect, shape of the defect and the proximity to free margins an O-T advancement flap was deemed most appropriate.  Using a sterile surgical marker, an appropriate advancement flap was drawn incorporating the defect and placing the expected incisions within the relaxed skin tension lines where possible.    The area thus outlined was incised deep to adipose tissue with a #15 scalpel blade.  The skin margins were undermined to an appropriate distance in all directions utilizing iris scissors.
O-L Flap Text: The defect edges were debeveled with a #15 scalpel blade.  Given the location of the defect, shape of the defect and the proximity to free margins an O-L flap was deemed most appropriate.  Using a sterile surgical marker, an appropriate advancement flap was drawn incorporating the defect and placing the expected incisions within the relaxed skin tension lines where possible.    The area thus outlined was incised deep to adipose tissue with a #15 scalpel blade.  The skin margins were undermined to an appropriate distance in all directions utilizing iris scissors.
O-Z Flap Text: The defect edges were debeveled with a #15 scalpel blade.  Given the location of the defect, shape of the defect and the proximity to free margins an O-Z flap was deemed most appropriate.  Using a sterile surgical marker, an appropriate transposition flap was drawn incorporating the defect and placing the expected incisions within the relaxed skin tension lines where possible. The area thus outlined was incised deep to adipose tissue with a #15 scalpel blade.  The skin margins were undermined to an appropriate distance in all directions utilizing iris scissors.
Double O-Z Flap Text: The defect edges were debeveled with a #15 scalpel blade.  Given the location of the defect, shape of the defect and the proximity to free margins a Double O-Z flap was deemed most appropriate.  Using a sterile surgical marker, an appropriate transposition flap was drawn incorporating the defect and placing the expected incisions within the relaxed skin tension lines where possible. The area thus outlined was incised deep to adipose tissue with a #15 scalpel blade.  The skin margins were undermined to an appropriate distance in all directions utilizing iris scissors.
V-Y Flap Text: The defect edges were debeveled with a #15 scalpel blade.  Given the location of the defect, shape of the defect and the proximity to free margins a V-Y flap was deemed most appropriate.  Using a sterile surgical marker, an appropriate advancement flap was drawn incorporating the defect and placing the expected incisions within the relaxed skin tension lines where possible.    The area thus outlined was incised deep to adipose tissue with a #15 scalpel blade.  The skin margins were undermined to an appropriate distance in all directions utilizing iris scissors.
Advancement-Rotation Flap Text: The defect edges were debeveled with a #15 scalpel blade.  Given the location of the defect, shape of the defect and the proximity to free margins an advancement-rotation flap was deemed most appropriate.  Using a sterile surgical marker, an appropriate flap was drawn incorporating the defect and placing the expected incisions within the relaxed skin tension lines where possible. The area thus outlined was incised deep to adipose tissue with a #15 scalpel blade.  The skin margins were undermined to an appropriate distance in all directions utilizing iris scissors.
Mercedes Flap Text: The defect edges were debeveled with a #15 scalpel blade.  Given the location of the defect, shape of the defect and the proximity to free margins a Mercedes flap was deemed most appropriate.  Using a sterile surgical marker, an appropriate advancement flap was drawn incorporating the defect and placing the expected incisions within the relaxed skin tension lines where possible. The area thus outlined was incised deep to adipose tissue with a #15 scalpel blade.  The skin margins were undermined to an appropriate distance in all directions utilizing iris scissors.
Modified Advancement Flap Text: The defect edges were debeveled with a #15 scalpel blade.  Given the location of the defect, shape of the defect and the proximity to free margins a modified advancement flap was deemed most appropriate.  Using a sterile surgical marker, an appropriate advancement flap was drawn incorporating the defect and placing the expected incisions within the relaxed skin tension lines where possible.    The area thus outlined was incised deep to adipose tissue with a #15 scalpel blade.  The skin margins were undermined to an appropriate distance in all directions utilizing iris scissors.
Mucosal Advancement Flap Text: Given the location of the defect, shape of the defect and the proximity to free margins a mucosal advancement flap was deemed most appropriate. Incisions were made with a 15 blade scalpel in the appropriate fashion along the cutaneous vermillion border and the mucosal lip. The remaining actinically damaged mucosal tissue was excised.  The mucosal advancement flap was then elevated to the gingival sulcus with care taken to preserve the neurovascular structures and advanced into the primary defect. Care was taken to ensure that precise realignment of the vermilion border was achieved.
Peng Advancement Flap Text: The defect edges were debeveled with a #15 scalpel blade.  Given the location of the defect, shape of the defect and the proximity to free margins a Peng advancement flap was deemed most appropriate.  Using a sterile surgical marker, an appropriate advancement flap was drawn incorporating the defect and placing the expected incisions within the relaxed skin tension lines where possible. The area thus outlined was incised deep to adipose tissue with a #15 scalpel blade.  The skin margins were undermined to an appropriate distance in all directions utilizing iris scissors.
Hatchet Flap Text: The defect edges were debeveled with a #15 scalpel blade.  Given the location of the defect, shape of the defect and the proximity to free margins a hatchet flap was deemed most appropriate.  Using a sterile surgical marker, an appropriate hatchet flap was drawn incorporating the defect and placing the expected incisions within the relaxed skin tension lines where possible.    The area thus outlined was incised deep to adipose tissue with a #15 scalpel blade.  The skin margins were undermined to an appropriate distance in all directions utilizing iris scissors.
Rotation Flap Text: The defect edges were debeveled with a #15 scalpel blade.  Given the location of the defect, shape of the defect and the proximity to free margins a rotation flap was deemed most appropriate.  Using a sterile surgical marker, an appropriate rotation flap was drawn incorporating the defect and placing the expected incisions within the relaxed skin tension lines where possible.    The area thus outlined was incised deep to adipose tissue with a #15 scalpel blade.  The skin margins were undermined to an appropriate distance in all directions utilizing iris scissors.
Bilateral Rotation Flap Text: The defect edges were debeveled with a #15 scalpel blade. Given the location of the defect, shape of the defect and the proximity to free margins a bilateral rotation flap was deemed most appropriate. Using a sterile surgical marker, an appropriate rotation flap was drawn incorporating the defect and placing the expected incisions within the relaxed skin tension lines where possible. The area thus outlined was incised deep to adipose tissue with a #15 scalpel blade. The skin margins were undermined to an appropriate distance in all directions utilizing iris scissors. Following this, the designed flap was carried over into the primary defect and sutured into place.
Spiral Flap Text: The defect edges were debeveled with a #15 scalpel blade.  Given the location of the defect, shape of the defect and the proximity to free margins a spiral flap was deemed most appropriate.  Using a sterile surgical marker, an appropriate rotation flap was drawn incorporating the defect and placing the expected incisions within the relaxed skin tension lines where possible. The area thus outlined was incised deep to adipose tissue with a #15 scalpel blade.  The skin margins were undermined to an appropriate distance in all directions utilizing iris scissors.
Staged Advancement Flap Text: The defect edges were debeveled with a #15 scalpel blade.  Given the location of the defect, shape of the defect and the proximity to free margins a staged advancement flap was deemed most appropriate.  Using a sterile surgical marker, an appropriate advancement flap was drawn incorporating the defect and placing the expected incisions within the relaxed skin tension lines where possible. The area thus outlined was incised deep to adipose tissue with a #15 scalpel blade.  The skin margins were undermined to an appropriate distance in all directions utilizing iris scissors.
Star Wedge Flap Text: The defect edges were debeveled with a #15 scalpel blade.  Given the location of the defect, shape of the defect and the proximity to free margins a star wedge flap was deemed most appropriate.  Using a sterile surgical marker, an appropriate rotation flap was drawn incorporating the defect and placing the expected incisions within the relaxed skin tension lines where possible. The area thus outlined was incised deep to adipose tissue with a #15 scalpel blade.  The skin margins were undermined to an appropriate distance in all directions utilizing iris scissors.
Transposition Flap Text: The defect edges were debeveled with a #15 scalpel blade.  Given the location of the defect and the proximity to free margins a transposition flap was deemed most appropriate.  Using a sterile surgical marker, an appropriate transposition flap was drawn incorporating the defect.    The area thus outlined was incised deep to adipose tissue with a #15 scalpel blade.  The skin margins were undermined to an appropriate distance in all directions utilizing iris scissors.
Muscle Hinge Flap Text: The defect edges were debeveled with a #15 scalpel blade.  Given the size, depth and location of the defect and the proximity to free margins a muscle hinge flap was deemed most appropriate.  Using a sterile surgical marker, an appropriate hinge flap was drawn incorporating the defect. The area thus outlined was incised with a #15 scalpel blade.  The skin margins were undermined to an appropriate distance in all directions utilizing iris scissors.
Mustarde Flap Text: The defect edges were debeveled with a #15 scalpel blade.  Given the size, depth and location of the defect and the proximity to free margins a Mustarde flap was deemed most appropriate.  Using a sterile surgical marker, an appropriate flap was drawn incorporating the defect. The area thus outlined was incised with a #15 scalpel blade.  The skin margins were undermined to an appropriate distance in all directions utilizing iris scissors.
Nasal Turnover Hinge Flap Text: The defect edges were debeveled with a #15 scalpel blade.  Given the size, depth, location of the defect and the defect being full thickness a nasal turnover hinge flap was deemed most appropriate.  Using a sterile surgical marker, an appropriate hinge flap was drawn incorporating the defect. The area thus outlined was incised with a #15 scalpel blade. The flap was designed to recreate the nasal mucosal lining and the alar rim. The skin margins were undermined to an appropriate distance in all directions utilizing iris scissors.
Nasalis-Muscle-Based Myocutaneous Island Pedicle Flap Text: Using a #15 blade, an incision was made around the donor flap to the level of the nasalis muscle. Wide lateral undermining was then performed in both the subcutaneous plane above the nasalis muscle, and in a submuscular plane just above periosteum. This allowed the formation of a free nasalis muscle axial pedicle (based on the angular artery) which was still attached to the actual cutaneous flap, increasing its mobility and vascular viability. Hemostasis was obtained with pinpoint electrocoagulation. The flap was mobilized into position and the pivotal anchor points positioned and stabilized with buried interrupted sutures. Subcutaneous and dermal tissues were closed in a multilayered fashion with sutures. Tissue redundancies were excised, and the epidermal edges were apposed without significant tension and sutured with sutures.
Orbicularis Oris Muscle Flap Text: The defect edges were debeveled with a #15 scalpel blade.  Given that the defect affected the competency of the oral sphincter an orbicularis oris muscle flap was deemed most appropriate to restore this competency and normal muscle function.  Using a sterile surgical marker, an appropriate flap was drawn incorporating the defect. The area thus outlined was incised with a #15 scalpel blade.
Melolabial Transposition Flap Text: The defect edges were debeveled with a #15 scalpel blade.  Given the location of the defect and the proximity to free margins a melolabial flap was deemed most appropriate.  Using a sterile surgical marker, an appropriate melolabial transposition flap was drawn incorporating the defect.    The area thus outlined was incised deep to adipose tissue with a #15 scalpel blade.  The skin margins were undermined to an appropriate distance in all directions utilizing iris scissors.
Rhombic Flap Text: The defect edges were debeveled with a #15 scalpel blade.  Given the location of the defect and the proximity to free margins a rhombic flap was deemed most appropriate.  Using a sterile surgical marker, an appropriate rhombic flap was drawn incorporating the defect.    The area thus outlined was incised deep to adipose tissue with a #15 scalpel blade.  The skin margins were undermined to an appropriate distance in all directions utilizing iris scissors.
Rhomboid Transposition Flap Text: The defect edges were debeveled with a #15 scalpel blade.  Given the location of the defect and the proximity to free margins a rhomboid transposition flap was deemed most appropriate.  Using a sterile surgical marker, an appropriate rhomboid flap was drawn incorporating the defect.    The area thus outlined was incised deep to adipose tissue with a #15 scalpel blade.  The skin margins were undermined to an appropriate distance in all directions utilizing iris scissors.
Bi-Rhombic Flap Text: The defect edges were debeveled with a #15 scalpel blade.  Given the location of the defect and the proximity to free margins a bi-rhombic flap was deemed most appropriate.  Using a sterile surgical marker, an appropriate rhombic flap was drawn incorporating the defect. The area thus outlined was incised deep to adipose tissue with a #15 scalpel blade.  The skin margins were undermined to an appropriate distance in all directions utilizing iris scissors.
Helical Rim Advancement Flap Text: The defect edges were debeveled with a #15 blade scalpel.  Given the location of the defect and the proximity to free margins (helical rim) a double helical rim advancement flap was deemed most appropriate.  Using a sterile surgical marker, the appropriate advancement flaps were drawn incorporating the defect and placing the expected incisions between the helical rim and antihelix where possible.  The area thus outlined was incised through and through with a #15 scalpel blade.  With a skin hook and iris scissors, the flaps were gently and sharply undermined and freed up.
Bilateral Helical Rim Advancement Flap Text: The defect edges were debeveled with a #15 blade scalpel.  Given the location of the defect and the proximity to free margins (helical rim) a bilateral helical rim advancement flap was deemed most appropriate.  Using a sterile surgical marker, the appropriate advancement flaps were drawn incorporating the defect and placing the expected incisions between the helical rim and antihelix where possible.  The area thus outlined was incised through and through with a #15 scalpel blade.  With a skin hook and iris scissors, the flaps were gently and sharply undermined and freed up.
Ear Star Wedge Flap Text: The defect edges were debeveled with a #15 blade scalpel.  Given the location of the defect and the proximity to free margins (helical rim) an ear star wedge flap was deemed most appropriate.  Using a sterile surgical marker, the appropriate flap was drawn incorporating the defect and placing the expected incisions between the helical rim and antihelix where possible.  The area thus outlined was incised through and through with a #15 scalpel blade.
Banner Transposition Flap Text: The defect edges were debeveled with a #15 scalpel blade.  Given the location of the defect and the proximity to free margins a Banner transposition flap was deemed most appropriate.  Using a sterile surgical marker, an appropriate flap drawn around the defect. The area thus outlined was incised deep to adipose tissue with a #15 scalpel blade.  The skin margins were undermined to an appropriate distance in all directions utilizing iris scissors.
Bilobed Flap Text: The defect edges were debeveled with a #15 scalpel blade.  Given the location of the defect and the proximity to free margins a bilobe flap was deemed most appropriate.  Using a sterile surgical marker, an appropriate bilobe flap drawn around the defect.    The area thus outlined was incised deep to adipose tissue with a #15 scalpel blade.  The skin margins were undermined to an appropriate distance in all directions utilizing iris scissors.
Bilobed Transposition Flap Text: The defect edges were debeveled with a #15 scalpel blade.  Given the location of the defect and the proximity to free margins a bilobed transposition flap was deemed most appropriate.  Using a sterile surgical marker, an appropriate bilobe flap drawn around the defect.    The area thus outlined was incised deep to adipose tissue with a #15 scalpel blade.  The skin margins were undermined to an appropriate distance in all directions utilizing iris scissors.
Trilobed Flap Text: The defect edges were debeveled with a #15 scalpel blade.  Given the location of the defect and the proximity to free margins a trilobed flap was deemed most appropriate.  Using a sterile surgical marker, an appropriate trilobed flap drawn around the defect.    The area thus outlined was incised deep to adipose tissue with a #15 scalpel blade.  The skin margins were undermined to an appropriate distance in all directions utilizing iris scissors.
Dorsal Nasal Flap Text: The defect edges were debeveled with a #15 scalpel blade.  Given the location of the defect and the proximity to free margins a dorsal nasal flap was deemed most appropriate.  Using a sterile surgical marker, an appropriate dorsal nasal flap was drawn around the defect.    The area thus outlined was incised deep to adipose tissue with a #15 scalpel blade.  The skin margins were undermined to an appropriate distance in all directions utilizing iris scissors.
Island Pedicle Flap Text: The defect edges were debeveled with a #15 scalpel blade.  Given the location of the defect, shape of the defect and the proximity to free margins an island pedicle advancement flap was deemed most appropriate.  Using a sterile surgical marker, an appropriate advancement flap was drawn incorporating the defect, outlining the appropriate donor tissue and placing the expected incisions within the relaxed skin tension lines where possible.    The area thus outlined was incised deep to adipose tissue with a #15 scalpel blade.  The skin margins were undermined to an appropriate distance in all directions around the primary defect and laterally outward around the island pedicle utilizing iris scissors.  There was minimal undermining beneath the pedicle flap.
Island Pedicle Flap With Canthal Suspension Text: The defect edges were debeveled with a #15 scalpel blade.  Given the location of the defect, shape of the defect and the proximity to free margins an island pedicle advancement flap was deemed most appropriate.  Using a sterile surgical marker, an appropriate advancement flap was drawn incorporating the defect, outlining the appropriate donor tissue and placing the expected incisions within the relaxed skin tension lines where possible. The area thus outlined was incised deep to adipose tissue with a #15 scalpel blade.  The skin margins were undermined to an appropriate distance in all directions around the primary defect and laterally outward around the island pedicle utilizing iris scissors.  There was minimal undermining beneath the pedicle flap. A suspension suture was placed in the canthal tendon to prevent tension and prevent ectropion.
Alar Island Pedicle Flap Text: The defect edges were debeveled with a #15 scalpel blade.  Given the location of the defect, shape of the defect and the proximity to the alar rim an island pedicle advancement flap was deemed most appropriate.  Using a sterile surgical marker, an appropriate advancement flap was drawn incorporating the defect, outlining the appropriate donor tissue and placing the expected incisions within the nasal ala running parallel to the alar rim. The area thus outlined was incised with a #15 scalpel blade.  The skin margins were undermined minimally to an appropriate distance in all directions around the primary defect and laterally outward around the island pedicle utilizing iris scissors.  There was minimal undermining beneath the pedicle flap.
Double Island Pedicle Flap Text: The defect edges were debeveled with a #15 scalpel blade.  Given the location of the defect, shape of the defect and the proximity to free margins a double island pedicle advancement flap was deemed most appropriate.  Using a sterile surgical marker, an appropriate advancement flap was drawn incorporating the defect, outlining the appropriate donor tissue and placing the expected incisions within the relaxed skin tension lines where possible.    The area thus outlined was incised deep to adipose tissue with a #15 scalpel blade.  The skin margins were undermined to an appropriate distance in all directions around the primary defect and laterally outward around the island pedicle utilizing iris scissors.  There was minimal undermining beneath the pedicle flap.
Island Pedicle Flap-Requiring Vessel Identification Text: The defect edges were debeveled with a #15 scalpel blade.  Given the location of the defect, shape of the defect and the proximity to free margins an island pedicle advancement flap was deemed most appropriate.  Using a sterile surgical marker, an appropriate advancement flap was drawn, based on the axial vessel mentioned above, incorporating the defect, outlining the appropriate donor tissue and placing the expected incisions within the relaxed skin tension lines where possible.    The area thus outlined was incised deep to adipose tissue with a #15 scalpel blade.  The skin margins were undermined to an appropriate distance in all directions around the primary defect and laterally outward around the island pedicle utilizing iris scissors.  There was minimal undermining beneath the pedicle flap.
Keystone Flap Text: The defect edges were debeveled with a #15 scalpel blade.  Given the location of the defect, shape of the defect a keystone flap was deemed most appropriate.  Using a sterile surgical marker, an appropriate keystone flap was drawn incorporating the defect, outlining the appropriate donor tissue and placing the expected incisions within the relaxed skin tension lines where possible. The area thus outlined was incised deep to adipose tissue with a #15 scalpel blade.  The skin margins were undermined to an appropriate distance in all directions around the primary defect and laterally outward around the flap utilizing iris scissors.
O-T Plasty Text: The defect edges were debeveled with a #15 scalpel blade.  Given the location of the defect, shape of the defect and the proximity to free margins an O-T plasty was deemed most appropriate.  Using a sterile surgical marker, an appropriate O-T plasty was drawn incorporating the defect and placing the expected incisions within the relaxed skin tension lines where possible.    The area thus outlined was incised deep to adipose tissue with a #15 scalpel blade.  The skin margins were undermined to an appropriate distance in all directions utilizing iris scissors.
O-Z Plasty Text: The defect edges were debeveled with a #15 scalpel blade.  Given the location of the defect, shape of the defect and the proximity to free margins an O-Z plasty (double transposition flap) was deemed most appropriate.  Using a sterile surgical marker, the appropriate transposition flaps were drawn incorporating the defect and placing the expected incisions within the relaxed skin tension lines where possible.    The area thus outlined was incised deep to adipose tissue with a #15 scalpel blade.  The skin margins were undermined to an appropriate distance in all directions utilizing iris scissors.  Hemostasis was achieved with electrocautery.  The flaps were then transposed into place, one clockwise and the other counterclockwise, and anchored with interrupted buried subcutaneous sutures.
Double O-Z Plasty Text: The defect edges were debeveled with a #15 scalpel blade.  Given the location of the defect, shape of the defect and the proximity to free margins a Double O-Z plasty (double transposition flap) was deemed most appropriate.  Using a sterile surgical marker, the appropriate transposition flaps were drawn incorporating the defect and placing the expected incisions within the relaxed skin tension lines where possible. The area thus outlined was incised deep to adipose tissue with a #15 scalpel blade.  The skin margins were undermined to an appropriate distance in all directions utilizing iris scissors.  Hemostasis was achieved with electrocautery.  The flaps were then transposed into place, one clockwise and the other counterclockwise, and anchored with interrupted buried subcutaneous sutures.
V-Y Plasty Text: The defect edges were debeveled with a #15 scalpel blade.  Given the location of the defect, shape of the defect and the proximity to free margins an V-Y advancement flap was deemed most appropriate.  Using a sterile surgical marker, an appropriate advancement flap was drawn incorporating the defect and placing the expected incisions within the relaxed skin tension lines where possible.    The area thus outlined was incised deep to adipose tissue with a #15 scalpel blade.  The skin margins were undermined to an appropriate distance in all directions utilizing iris scissors.
H Plasty Text: Given the location of the defect, shape of the defect and the proximity to free margins a H-plasty was deemed most appropriate for repair.  Using a sterile surgical marker, the appropriate advancement arms of the H-plasty were drawn incorporating the defect and placing the expected incisions within the relaxed skin tension lines where possible. The area thus outlined was incised deep to adipose tissue with a #15 scalpel blade. The skin margins were undermined to an appropriate distance in all directions utilizing iris scissors.  The opposing advancement arms were then advanced into place in opposite direction and anchored with interrupted buried subcutaneous sutures.
W Plasty Text: The lesion was extirpated to the level of the fat with a #15 scalpel blade.  Given the location of the defect, shape of the defect and the proximity to free margins a W-plasty was deemed most appropriate for repair.  Using a sterile surgical marker, the appropriate transposition arms of the W-plasty were drawn incorporating the defect and placing the expected incisions within the relaxed skin tension lines where possible.    The area thus outlined was incised deep to adipose tissue with a #15 scalpel blade.  The skin margins were undermined to an appropriate distance in all directions utilizing iris scissors.  The opposing transposition arms were then transposed into place in opposite direction and anchored with interrupted buried subcutaneous sutures.
Z Plasty Text: The lesion was extirpated to the level of the fat with a #15 scalpel blade.  Given the location of the defect, shape of the defect and the proximity to free margins a Z-plasty was deemed most appropriate for repair.  Using a sterile surgical marker, the appropriate transposition arms of the Z-plasty were drawn incorporating the defect and placing the expected incisions within the relaxed skin tension lines where possible.    The area thus outlined was incised deep to adipose tissue with a #15 scalpel blade.  The skin margins were undermined to an appropriate distance in all directions utilizing iris scissors.  The opposing transposition arms were then transposed into place in opposite direction and anchored with interrupted buried subcutaneous sutures.
Double Z Plasty Text: The lesion was extirpated to the level of the fat with a #15 scalpel blade. Given the location of the defect, shape of the defect and the proximity to free margins a double Z-plasty was deemed most appropriate for repair. Using a sterile surgical marker, the appropriate transposition arms of the double Z-plasty were drawn incorporating the defect and placing the expected incisions within the relaxed skin tension lines where possible. The area thus outlined was incised deep to adipose tissue with a #15 scalpel blade. The skin margins were undermined to an appropriate distance in all directions utilizing iris scissors. The opposing transposition arms were then transposed and carried over into place in opposite direction and anchored with interrupted buried subcutaneous sutures.
Zygomaticofacial Flap Text: Given the location of the defect, shape of the defect and the proximity to free margins a zygomaticofacial flap was deemed most appropriate for repair.  Using a sterile surgical marker, the appropriate flap was drawn incorporating the defect and placing the expected incisions within the relaxed skin tension lines where possible. The area thus outlined was incised deep to adipose tissue with a #15 scalpel blade with preservation of a vascular pedicle.  The skin margins were undermined to an appropriate distance in all directions utilizing iris scissors.  The flap was then placed into the defect and anchored with interrupted buried subcutaneous sutures.
Cheek Interpolation Flap Text: A decision was made to reconstruct the defect utilizing an interpolation axial flap and a staged reconstruction.  A telfa template was made of the defect.  This telfa template was then used to outline the Cheek Interpolation flap.  The donor area for the pedicle flap was then injected with anesthesia.  The flap was excised through the skin and subcutaneous tissue down to the layer of the underlying musculature.  The interpolation flap was carefully excised within this deep plane to maintain its blood supply.  The edges of the donor site were undermined.   The donor site was closed in a primary fashion.  The pedicle was then rotated into position and sutured.  Once the tube was sutured into place, adequate blood supply was confirmed with blanching and refill.  The pedicle was then wrapped with xeroform gauze and dressed appropriately with a telfa and gauze bandage to ensure continued blood supply and protect the attached pedicle.
Cheek-To-Nose Interpolation Flap Text: A decision was made to reconstruct the defect utilizing an interpolation axial flap and a staged reconstruction.  A telfa template was made of the defect.  This telfa template was then used to outline the Cheek-To-Nose Interpolation flap.  The donor area for the pedicle flap was then injected with anesthesia.  The flap was excised through the skin and subcutaneous tissue down to the layer of the underlying musculature.  The interpolation flap was carefully excised within this deep plane to maintain its blood supply.  The edges of the donor site were undermined.   The donor site was closed in a primary fashion.  The pedicle was then rotated into position and sutured.  Once the tube was sutured into place, adequate blood supply was confirmed with blanching and refill.  The pedicle was then wrapped with xeroform gauze and dressed appropriately with a telfa and gauze bandage to ensure continued blood supply and protect the attached pedicle.
Interpolation Flap Text: A decision was made to reconstruct the defect utilizing an interpolation axial flap and a staged reconstruction.  A telfa template was made of the defect.  This telfa template was then used to outline the interpolation flap.  The donor area for the pedicle flap was then injected with anesthesia.  The flap was excised through the skin and subcutaneous tissue down to the layer of the underlying musculature.  The interpolation flap was carefully excised within this deep plane to maintain its blood supply.  The edges of the donor site were undermined.   The donor site was closed in a primary fashion.  The pedicle was then rotated into position and sutured.  Once the tube was sutured into place, adequate blood supply was confirmed with blanching and refill.  The pedicle was then wrapped with xeroform gauze and dressed appropriately with a telfa and gauze bandage to ensure continued blood supply and protect the attached pedicle.
Melolabial Interpolation Flap Text: A decision was made to reconstruct the defect utilizing an interpolation axial flap and a staged reconstruction.  A telfa template was made of the defect.  This telfa template was then used to outline the melolabial interpolation flap.  The donor area for the pedicle flap was then injected with anesthesia.  The flap was excised through the skin and subcutaneous tissue down to the layer of the underlying musculature.  The pedicle flap was carefully excised within this deep plane to maintain its blood supply.  The edges of the donor site were undermined.   The donor site was closed in a primary fashion.  The pedicle was then rotated into position and sutured.  Once the tube was sutured into place, adequate blood supply was confirmed with blanching and refill.  The pedicle was then wrapped with xeroform gauze and dressed appropriately with a telfa and gauze bandage to ensure continued blood supply and protect the attached pedicle.
Mastoid Interpolation Flap Text: A decision was made to reconstruct the defect utilizing an interpolation axial flap and a staged reconstruction.  A telfa template was made of the defect.  This telfa template was then used to outline the mastoid interpolation flap.  The donor area for the pedicle flap was then injected with anesthesia.  The flap was excised through the skin and subcutaneous tissue down to the layer of the underlying musculature.  The pedicle flap was carefully excised within this deep plane to maintain its blood supply.  The edges of the donor site were undermined.   The donor site was closed in a primary fashion.  The pedicle was then rotated into position and sutured.  Once the tube was sutured into place, adequate blood supply was confirmed with blanching and refill.  The pedicle was then wrapped with xeroform gauze and dressed appropriately with a telfa and gauze bandage to ensure continued blood supply and protect the attached pedicle.
Posterior Auricular Interpolation Flap Text: A decision was made to reconstruct the defect utilizing an interpolation axial flap and a staged reconstruction.  A telfa template was made of the defect.  This telfa template was then used to outline the posterior auricular interpolation flap.  The donor area for the pedicle flap was then injected with anesthesia.  The flap was excised through the skin and subcutaneous tissue down to the layer of the underlying musculature.  The pedicle flap was carefully excised within this deep plane to maintain its blood supply.  The edges of the donor site were undermined.   The donor site was closed in a primary fashion.  The pedicle was then rotated into position and sutured.  Once the tube was sutured into place, adequate blood supply was confirmed with blanching and refill.  The pedicle was then wrapped with xeroform gauze and dressed appropriately with a telfa and gauze bandage to ensure continued blood supply and protect the attached pedicle.
Paramedian Forehead Flap Text: A decision was made to reconstruct the defect utilizing an interpolation axial flap and a staged reconstruction.  A telfa template was made of the defect.  This telfa template was then used to outline the paramedian forehead pedicle flap.  The donor area for the pedicle flap was then injected with anesthesia.  The flap was excised through the skin and subcutaneous tissue down to the layer of the underlying musculature.  The pedicle flap was carefully excised within this deep plane to maintain its blood supply.  The edges of the donor site were undermined.   The donor site was closed in a primary fashion.  The pedicle was then rotated into position and sutured.  Once the tube was sutured into place, adequate blood supply was confirmed with blanching and refill.  The pedicle was then wrapped with xeroform gauze and dressed appropriately with a telfa and gauze bandage to ensure continued blood supply and protect the attached pedicle.
Abbe Flap (Upper To Lower Lip) Text: The defect of the lower lip was assessed and measured.  Given the location and size of the defect, an Abbe flap was deemed most appropriate.  Using a sterile surgical marker, an appropriate Abbe flap was measured and drawn on the upper lip. Local anesthesia was then infiltrated.  A scalpel was then used to incise the upper lip through and through the skin, vermilion, muscle and mucosa, leaving the flap pedicled on the opposite side.  The flap was then rotated and transferred to the lower lip defect.  The flap was then sutured into place with a three layer technique, closing the orbicularis oris muscle layer with subcutaneous buried sutures, followed by a mucosal layer and an epidermal layer.
Abbe Flap (Lower To Upper Lip) Text: The defect of the upper lip was assessed and measured.  Given the location and size of the defect, an Abbe flap was deemed most appropriate.  Using a sterile surgical marker, an appropriate Abbe flap was measured and drawn on the lower lip. Local anesthesia was then infiltrated. A scalpel was then used to incise the upper lip through and through the skin, vermilion, muscle and mucosa, leaving the flap pedicled on the opposite side.  The flap was then rotated and transferred to the lower lip defect.  The flap was then sutured into place with a three layer technique, closing the orbicularis oris muscle layer with subcutaneous buried sutures, followed by a mucosal layer and an epidermal layer.
Estlander Flap (Upper To Lower Lip) Text: The defect of the lower lip was assessed and measured.  Given the location and size of the defect, an Estlander flap was deemed most appropriate.  Using a sterile surgical marker, an appropriate Estlander flap was measured and drawn on the upper lip. Local anesthesia was then infiltrated. A scalpel was then used to incise the lateral aspect of the flap, through skin, muscle and mucosa, leaving the flap pedicled medially.  The flap was then rotated and positioned to fill the lower lip defect.  The flap was then sutured into place with a three layer technique, closing the orbicularis oris muscle layer with subcutaneous buried sutures, followed by a mucosal layer and an epidermal layer.
Lip Wedge Excision Repair Text: Given the location of the defect and the proximity to free margins a full thickness wedge repair was deemed most appropriate.  Using a sterile surgical marker, the appropriate repair was drawn incorporating the defect and placing the expected incisions perpendicular to the vermilion border.  The vermilion border was also meticulously outlined to ensure appropriate reapproximation during the repair.  The area thus outlined was incised through and through with a #15 scalpel blade.  The muscularis and dermis were reaproximated with deep sutures following hemostasis. Care was taken to realign the vermilion border before proceeding with the superficial closure.  Once the vermilion was realigned the superfical and mucosal closure was finished.
Ftsg Text: The defect edges were debeveled with a #15 scalpel blade.  Given the location of the defect, shape of the defect and the proximity to free margins a full thickness skin graft was deemed most appropriate.  Using a sterile surgical marker, the primary defect shape was transferred to the donor site. The area thus outlined was incised deep to adipose tissue with a #15 scalpel blade.  The harvested graft was then trimmed of adipose tissue until only dermis and epidermis was left.  The skin margins of the secondary defect were undermined to an appropriate distance in all directions utilizing iris scissors.  The secondary defect was closed with interrupted buried subcutaneous sutures.  The skin edges were then re-apposed with running  sutures.  The skin graft was then placed in the primary defect and oriented appropriately.
Split-Thickness Skin Graft Text: The defect edges were debeveled with a #15 scalpel blade.  Given the location of the defect, shape of the defect and the proximity to free margins a split thickness skin graft was deemed most appropriate.  Using a sterile surgical marker, the primary defect shape was transferred to the donor site. The split thickness graft was then harvested.  The skin graft was then placed in the primary defect and oriented appropriately.
Pinch Graft Text: The defect edges were debeveled with a #15 scalpel blade. Given the location of the defect, shape of the defect and the proximity to free margins a pinch graft was deemed most appropriate. Using a sterile surgical marker, the primary defect shape was transferred to the donor site. The area thus outlined was incised deep to adipose tissue with a #15 scalpel blade.  The harvested graft was then trimmed of adipose tissue until only dermis and epidermis was left. The skin margins of the secondary defect were undermined to an appropriate distance in all directions utilizing iris scissors.  The secondary defect was closed with interrupted buried subcutaneous sutures.  The skin edges were then re-apposed with running  sutures.  The skin graft was then placed in the primary defect and oriented appropriately.
Burow's Graft Text: The defect edges were debeveled with a #15 scalpel blade.  Given the location of the defect, shape of the defect, the proximity to free margins and the presence of a standing cone deformity a Burow's skin graft was deemed most appropriate. The standing cone was removed and this tissue was then trimmed to the shape of the primary defect. The adipose tissue was also removed until only dermis and epidermis were left.  The skin margins of the secondary defect were undermined to an appropriate distance in all directions utilizing iris scissors.  The secondary defect was closed with interrupted buried subcutaneous sutures.  The skin edges were then re-apposed with running  sutures.  The skin graft was then placed in the primary defect and oriented appropriately.
Cartilage Graft Text: The defect edges were debeveled with a #15 scalpel blade.  Given the location of the defect, shape of the defect, the fact the defect involved a full thickness cartilage defect a cartilage graft was deemed most appropriate.  An appropriate donor site was identified, cleansed, and anesthetized. The cartilage graft was then harvested and transferred to the recipient site, oriented appropriately and then sutured into place.  The secondary defect was then repaired using a primary closure.
Composite Graft Text: The defect edges were debeveled with a #15 scalpel blade.  Given the location of the defect, shape of the defect, the proximity to free margins and the fact the defect was full thickness a composite graft was deemed most appropriate.  The defect was outline and then transferred to the donor site.  A full thickness graft was then excised from the donor site. The graft was then placed in the primary defect, oriented appropriately and then sutured into place.  The secondary defect was then repaired using a primary closure.
Epidermal Autograft Text: The defect edges were debeveled with a #15 scalpel blade.  Given the location of the defect, shape of the defect and the proximity to free margins an epidermal autograft was deemed most appropriate.  Using a sterile surgical marker, the primary defect shape was transferred to the donor site. The epidermal graft was then harvested.  The skin graft was then placed in the primary defect and oriented appropriately.
Dermal Autograft Text: The defect edges were debeveled with a #15 scalpel blade.  Given the location of the defect, shape of the defect and the proximity to free margins a dermal autograft was deemed most appropriate.  Using a sterile surgical marker, the primary defect shape was transferred to the donor site. The area thus outlined was incised deep to adipose tissue with a #15 scalpel blade.  The harvested graft was then trimmed of adipose and epidermal tissue until only dermis was left.  The skin graft was then placed in the primary defect and oriented appropriately.
Skin Substitute Text: The defect edges were debeveled with a #15 scalpel blade.  Given the location of the defect, shape of the defect and the proximity to free margins a skin substitute graft was deemed most appropriate.  The graft material was trimmed to fit the size of the defect. The graft was then placed in the primary defect and oriented appropriately.
Tissue Cultured Epidermal Autograft Text: The defect edges were debeveled with a #15 scalpel blade.  Given the location of the defect, shape of the defect and the proximity to free margins a tissue cultured epidermal autograft was deemed most appropriate.  The graft was then trimmed to fit the size of the defect.  The graft was then placed in the primary defect and oriented appropriately.
Xenograft Text: The defect edges were debeveled with a #15 scalpel blade.  Given the location of the defect, shape of the defect and the proximity to free margins a xenograft was deemed most appropriate.  The graft was then trimmed to fit the size of the defect.  The graft was then placed in the primary defect and oriented appropriately.
Purse String (Intermediate) Text: Given the location of the defect and the characteristics of the surrounding skin a purse string intermediate closure was deemed most appropriate.  Undermining was performed circumferentially around the surgical defect.  A purse string suture was then placed and tightened.
Purse String (Simple) Text: Given the location of the defect and the characteristics of the surrounding skin a purse string simple closure was deemed most appropriate.  Undermining was performed circumferentially around the surgical defect.  A purse string suture was then placed and tightened.
Complex Repair And Single Advancement Flap Text: The defect edges were debeveled with a #15 scalpel blade.  The primary defect was closed partially with a complex linear closure.  Given the location of the remaining defect, shape of the defect and the proximity to free margins a single advancement flap was deemed most appropriate for complete closure of the defect.  Using a sterile surgical marker, an appropriate advancement flap was drawn incorporating the defect and placing the expected incisions within the relaxed skin tension lines where possible.    The area thus outlined was incised deep to adipose tissue with a #15 scalpel blade.  The skin margins were undermined to an appropriate distance in all directions utilizing iris scissors.
Complex Repair And Double Advancement Flap Text: The defect edges were debeveled with a #15 scalpel blade.  The primary defect was closed partially with a complex linear closure.  Given the location of the remaining defect, shape of the defect and the proximity to free margins a double advancement flap was deemed most appropriate for complete closure of the defect.  Using a sterile surgical marker, an appropriate advancement flap was drawn incorporating the defect and placing the expected incisions within the relaxed skin tension lines where possible.    The area thus outlined was incised deep to adipose tissue with a #15 scalpel blade.  The skin margins were undermined to an appropriate distance in all directions utilizing iris scissors.
Complex Repair And Modified Advancement Flap Text: The defect edges were debeveled with a #15 scalpel blade.  The primary defect was closed partially with a complex linear closure.  Given the location of the remaining defect, shape of the defect and the proximity to free margins a modified advancement flap was deemed most appropriate for complete closure of the defect.  Using a sterile surgical marker, an appropriate advancement flap was drawn incorporating the defect and placing the expected incisions within the relaxed skin tension lines where possible.    The area thus outlined was incised deep to adipose tissue with a #15 scalpel blade.  The skin margins were undermined to an appropriate distance in all directions utilizing iris scissors.
Complex Repair And A-T Advancement Flap Text: The defect edges were debeveled with a #15 scalpel blade.  The primary defect was closed partially with a complex linear closure.  Given the location of the remaining defect, shape of the defect and the proximity to free margins an A-T advancement flap was deemed most appropriate for complete closure of the defect.  Using a sterile surgical marker, an appropriate advancement flap was drawn incorporating the defect and placing the expected incisions within the relaxed skin tension lines where possible.    The area thus outlined was incised deep to adipose tissue with a #15 scalpel blade.  The skin margins were undermined to an appropriate distance in all directions utilizing iris scissors.
Complex Repair And O-T Advancement Flap Text: The defect edges were debeveled with a #15 scalpel blade.  The primary defect was closed partially with a complex linear closure.  Given the location of the remaining defect, shape of the defect and the proximity to free margins an O-T advancement flap was deemed most appropriate for complete closure of the defect.  Using a sterile surgical marker, an appropriate advancement flap was drawn incorporating the defect and placing the expected incisions within the relaxed skin tension lines where possible.    The area thus outlined was incised deep to adipose tissue with a #15 scalpel blade.  The skin margins were undermined to an appropriate distance in all directions utilizing iris scissors.
Complex Repair And O-L Flap Text: The defect edges were debeveled with a #15 scalpel blade.  The primary defect was closed partially with a complex linear closure.  Given the location of the remaining defect, shape of the defect and the proximity to free margins an O-L flap was deemed most appropriate for complete closure of the defect.  Using a sterile surgical marker, an appropriate flap was drawn incorporating the defect and placing the expected incisions within the relaxed skin tension lines where possible.    The area thus outlined was incised deep to adipose tissue with a #15 scalpel blade.  The skin margins were undermined to an appropriate distance in all directions utilizing iris scissors.
Complex Repair And Bilobe Flap Text: The defect edges were debeveled with a #15 scalpel blade.  The primary defect was closed partially with a complex linear closure.  Given the location of the remaining defect, shape of the defect and the proximity to free margins a bilobe flap was deemed most appropriate for complete closure of the defect.  Using a sterile surgical marker, an appropriate advancement flap was drawn incorporating the defect and placing the expected incisions within the relaxed skin tension lines where possible.    The area thus outlined was incised deep to adipose tissue with a #15 scalpel blade.  The skin margins were undermined to an appropriate distance in all directions utilizing iris scissors.
Complex Repair And Melolabial Flap Text: The defect edges were debeveled with a #15 scalpel blade.  The primary defect was closed partially with a complex linear closure.  Given the location of the remaining defect, shape of the defect and the proximity to free margins a melolabial flap was deemed most appropriate for complete closure of the defect.  Using a sterile surgical marker, an appropriate advancement flap was drawn incorporating the defect and placing the expected incisions within the relaxed skin tension lines where possible.    The area thus outlined was incised deep to adipose tissue with a #15 scalpel blade.  The skin margins were undermined to an appropriate distance in all directions utilizing iris scissors.
Complex Repair And Rotation Flap Text: The defect edges were debeveled with a #15 scalpel blade.  The primary defect was closed partially with a complex linear closure.  Given the location of the remaining defect, shape of the defect and the proximity to free margins a rotation flap was deemed most appropriate for complete closure of the defect.  Using a sterile surgical marker, an appropriate advancement flap was drawn incorporating the defect and placing the expected incisions within the relaxed skin tension lines where possible.    The area thus outlined was incised deep to adipose tissue with a #15 scalpel blade.  The skin margins were undermined to an appropriate distance in all directions utilizing iris scissors.
Complex Repair And Rhombic Flap Text: The defect edges were debeveled with a #15 scalpel blade.  The primary defect was closed partially with a complex linear closure.  Given the location of the remaining defect, shape of the defect and the proximity to free margins a rhombic flap was deemed most appropriate for complete closure of the defect.  Using a sterile surgical marker, an appropriate advancement flap was drawn incorporating the defect and placing the expected incisions within the relaxed skin tension lines where possible.    The area thus outlined was incised deep to adipose tissue with a #15 scalpel blade.  The skin margins were undermined to an appropriate distance in all directions utilizing iris scissors.
Complex Repair And Transposition Flap Text: The defect edges were debeveled with a #15 scalpel blade.  The primary defect was closed partially with a complex linear closure.  Given the location of the remaining defect, shape of the defect and the proximity to free margins a transposition flap was deemed most appropriate for complete closure of the defect.  Using a sterile surgical marker, an appropriate advancement flap was drawn incorporating the defect and placing the expected incisions within the relaxed skin tension lines where possible.    The area thus outlined was incised deep to adipose tissue with a #15 scalpel blade.  The skin margins were undermined to an appropriate distance in all directions utilizing iris scissors.
Complex Repair And V-Y Plasty Text: The defect edges were debeveled with a #15 scalpel blade.  The primary defect was closed partially with a complex linear closure.  Given the location of the remaining defect, shape of the defect and the proximity to free margins a V-Y plasty was deemed most appropriate for complete closure of the defect.  Using a sterile surgical marker, an appropriate advancement flap was drawn incorporating the defect and placing the expected incisions within the relaxed skin tension lines where possible.    The area thus outlined was incised deep to adipose tissue with a #15 scalpel blade.  The skin margins were undermined to an appropriate distance in all directions utilizing iris scissors.
Complex Repair And M Plasty Text: The defect edges were debeveled with a #15 scalpel blade.  The primary defect was closed partially with a complex linear closure.  Given the location of the remaining defect, shape of the defect and the proximity to free margins an M plasty was deemed most appropriate for complete closure of the defect.  Using a sterile surgical marker, an appropriate advancement flap was drawn incorporating the defect and placing the expected incisions within the relaxed skin tension lines where possible.    The area thus outlined was incised deep to adipose tissue with a #15 scalpel blade.  The skin margins were undermined to an appropriate distance in all directions utilizing iris scissors.
Complex Repair And Double M Plasty Text: The defect edges were debeveled with a #15 scalpel blade.  The primary defect was closed partially with a complex linear closure.  Given the location of the remaining defect, shape of the defect and the proximity to free margins a double M plasty was deemed most appropriate for complete closure of the defect.  Using a sterile surgical marker, an appropriate advancement flap was drawn incorporating the defect and placing the expected incisions within the relaxed skin tension lines where possible.    The area thus outlined was incised deep to adipose tissue with a #15 scalpel blade.  The skin margins were undermined to an appropriate distance in all directions utilizing iris scissors.
Complex Repair And W Plasty Text: The defect edges were debeveled with a #15 scalpel blade.  The primary defect was closed partially with a complex linear closure.  Given the location of the remaining defect, shape of the defect and the proximity to free margins a W plasty was deemed most appropriate for complete closure of the defect.  Using a sterile surgical marker, an appropriate advancement flap was drawn incorporating the defect and placing the expected incisions within the relaxed skin tension lines where possible.    The area thus outlined was incised deep to adipose tissue with a #15 scalpel blade.  The skin margins were undermined to an appropriate distance in all directions utilizing iris scissors.
Complex Repair And Z Plasty Text: The defect edges were debeveled with a #15 scalpel blade.  The primary defect was closed partially with a complex linear closure.  Given the location of the remaining defect, shape of the defect and the proximity to free margins a Z plasty was deemed most appropriate for complete closure of the defect.  Using a sterile surgical marker, an appropriate advancement flap was drawn incorporating the defect and placing the expected incisions within the relaxed skin tension lines where possible.    The area thus outlined was incised deep to adipose tissue with a #15 scalpel blade.  The skin margins were undermined to an appropriate distance in all directions utilizing iris scissors.
Complex Repair And Dorsal Nasal Flap Text: The defect edges were debeveled with a #15 scalpel blade.  The primary defect was closed partially with a complex linear closure.  Given the location of the remaining defect, shape of the defect and the proximity to free margins a dorsal nasal flap was deemed most appropriate for complete closure of the defect.  Using a sterile surgical marker, an appropriate flap was drawn incorporating the defect and placing the expected incisions within the relaxed skin tension lines where possible.    The area thus outlined was incised deep to adipose tissue with a #15 scalpel blade.  The skin margins were undermined to an appropriate distance in all directions utilizing iris scissors.
Complex Repair And Ftsg Text: The defect edges were debeveled with a #15 scalpel blade.  The primary defect was closed partially with a complex linear closure.  Given the location of the defect, shape of the defect and the proximity to free margins a full thickness skin graft was deemed most appropriate to repair the remaining defect.  The graft was trimmed to fit the size of the remaining defect.  The graft was then placed in the primary defect, oriented appropriately, and sutured into place.
Complex Repair And Burow's Graft Text: The defect edges were debeveled with a #15 scalpel blade.  The primary defect was closed partially with a complex linear closure.  Given the location of the defect, shape of the defect, the proximity to free margins and the presence of a standing cone deformity a Burow's graft was deemed most appropriate to repair the remaining defect.  The graft was trimmed to fit the size of the remaining defect.  The graft was then placed in the primary defect, oriented appropriately, and sutured into place.
Complex Repair And Split-Thickness Skin Graft Text: The defect edges were debeveled with a #15 scalpel blade.  The primary defect was closed partially with a complex linear closure.  Given the location of the defect, shape of the defect and the proximity to free margins a split thickness skin graft was deemed most appropriate to repair the remaining defect.  The graft was trimmed to fit the size of the remaining defect.  The graft was then placed in the primary defect, oriented appropriately, and sutured into place.
Complex Repair And Epidermal Autograft Text: The defect edges were debeveled with a #15 scalpel blade.  The primary defect was closed partially with a complex linear closure.  Given the location of the defect, shape of the defect and the proximity to free margins an epidermal autograft was deemed most appropriate to repair the remaining defect.  The graft was trimmed to fit the size of the remaining defect.  The graft was then placed in the primary defect, oriented appropriately, and sutured into place.
Complex Repair And Dermal Autograft Text: The defect edges were debeveled with a #15 scalpel blade.  The primary defect was closed partially with a complex linear closure.  Given the location of the defect, shape of the defect and the proximity to free margins an dermal autograft was deemed most appropriate to repair the remaining defect.  The graft was trimmed to fit the size of the remaining defect.  The graft was then placed in the primary defect, oriented appropriately, and sutured into place.
Complex Repair And Tissue Cultured Epidermal Autograft Text: The defect edges were debeveled with a #15 scalpel blade.  The primary defect was closed partially with a complex linear closure.  Given the location of the defect, shape of the defect and the proximity to free margins an tissue cultured epidermal autograft was deemed most appropriate to repair the remaining defect.  The graft was trimmed to fit the size of the remaining defect.  The graft was then placed in the primary defect, oriented appropriately, and sutured into place.
Complex Repair And Xenograft Text: The defect edges were debeveled with a #15 scalpel blade.  The primary defect was closed partially with a complex linear closure.  Given the location of the defect, shape of the defect and the proximity to free margins a xenograft was deemed most appropriate to repair the remaining defect.  The graft was trimmed to fit the size of the remaining defect.  The graft was then placed in the primary defect, oriented appropriately, and sutured into place.
Complex Repair And Skin Substitute Graft Text: The defect edges were debeveled with a #15 scalpel blade.  The primary defect was closed partially with a complex linear closure.  Given the location of the remaining defect, shape of the defect and the proximity to free margins a skin substitute graft was deemed most appropriate to repair the remaining defect.  The graft was trimmed to fit the size of the remaining defect.  The graft was then placed in the primary defect, oriented appropriately, and sutured into place.
Path Notes (To The Dermatopathologist): Please check margins.
Consent was obtained from the patient. The risks and benefits to therapy were discussed in detail. Specifically, the risks of infection, scarring, bleeding, prolonged wound healing, incomplete removal, allergy to anesthesia, nerve injury and recurrence were addressed. Prior to the procedure, the treatment site was clearly identified and confirmed by the patient. All components of Universal Protocol/PAUSE Rule completed.
Render Post-Care Instructions In Note?: yes
Post-Care Instructions: I reviewed with the patient in detail post-care instructions. Patient is not to engage in any heavy lifting, exercise, or swimming for the next 14 days. Should the patient develop any fevers, chills, bleeding, severe pain patient will contact the office immediately.
Home Suture Removal Text: Patient was provided a home suture removal kit and will remove their sutures at home.  If they have any questions or difficulties they will call the office.
Where Do You Want The Question To Include Opioid Counseling Located?: Case Summary Tab
Billing Type: Third-Party Bill
Information: Selecting Yes will display possible errors in your note based on the variables you have selected. This validation is only offered as a suggestion for you. PLEASE NOTE THAT THE VALIDATION TEXT WILL BE REMOVED WHEN YOU FINALIZE YOUR NOTE. IF YOU WANT TO FAX A PRELIMINARY NOTE YOU WILL NEED TO TOGGLE THIS TO 'NO' IF YOU DO NOT WANT IT IN YOUR FAXED NOTE.

## 2023-09-01 ENCOUNTER — TELEPHONE (OUTPATIENT)
Dept: MEDICAL GROUP | Facility: PHYSICIAN GROUP | Age: 68
End: 2023-09-01
Payer: MEDICARE

## 2023-09-01 ENCOUNTER — TELEPHONE (OUTPATIENT)
Dept: HEALTH INFORMATION MANAGEMENT | Facility: OTHER | Age: 68
End: 2023-09-01
Payer: MEDICARE

## 2023-09-01 NOTE — TELEPHONE ENCOUNTER
Spoke with patient's spouse, she is sleeping and fever has gone down to 102 F.     Advised to monitor oxygent and be seen at  [facility].    ER precautions if patient has chest pain or SOB.

## 2023-09-06 ENCOUNTER — NURSE TRIAGE (OUTPATIENT)
Dept: HEALTH INFORMATION MANAGEMENT | Facility: OTHER | Age: 68
End: 2023-09-06
Payer: MEDICARE

## 2023-09-06 NOTE — TELEPHONE ENCOUNTER
"Reason for Disposition   [1] COVID-19 infection diagnosed or suspected AND [2] mild symptoms (fever, cough) AND [3] no trouble breathing or other complications    Answer Assessment - Initial Assessment Questions  1. COVID-19 DIAGNOSIS: \"Who made your Coronavirus (COVID-19) diagnosis?\" \"Was it confirmed by a positive lab test?\" If not diagnosed by a HCP, ask \"Are there lots of cases (community spread) where you live?\" (See public health department website, if unsure)    * MAJOR community spread: high number of cases; numbers of cases are increasing; many people hospitalized.    * MINOR community spread: low number of cases; not increasing; few or no people hospitalized      MINOR  2. ONSET: \"When did the COVID-19 symptoms start?\"       LAST THURSDAY  3. WORST SYMPTOM: \"What is your worst symptom?\" (e.g., cough, fever, shortness of breath, muscle aches)      FATIGUE  4. COUGH: \"How bad is the cough?\"        NO  5. FEVER: \"Do you have a fever?\" If so, ask: \"What is your temperature, how was it measured, and when did it start?\"      103F ON SATURDAY, NO FEVER TODAY  6. RESPIRATORY STATUS: \"Describe your breathing?\" (e.g., shortness of breath, wheezing, unable to speak)       NO  7. BETTER-SAME-WORSE: \"Are you getting better, staying the same or getting worse compared to yesterday?\"  If getting worse, ask, \"In what way?\"      BETTER  8. HIGH RISK DISEASE: \"Do you have any chronic medical problems?\" (e.g., asthma, heart or lung disease, weak immune system, etc.)      NO  9. PREGNANCY: \"Is there any chance you are pregnant?\" \"When was your last menstrual period?\"      NO  10. OTHER SYMPTOMS: \"Do you have any other symptoms?\"  (e.g., runny nose, headache, sore throat, loss of smell)        NASAL CONGESTION    Protocols used: Coronavirus (COVID-19) Diagnosed or [id number]    "

## 2023-09-11 ENCOUNTER — TELEPHONE (OUTPATIENT)
Dept: MEDICAL GROUP | Facility: PHYSICIAN GROUP | Age: 68
End: 2023-09-11
Payer: MEDICARE

## 2023-09-11 NOTE — TELEPHONE ENCOUNTER
Patient went to the [facility] lab to have her TSH lab drawn, but no orders were placed. Please advise patient if this needs to be done before next appointment on 9/19.

## 2023-09-18 ENCOUNTER — OFFICE VISIT (OUTPATIENT)
Dept: SLEEP MEDICINE | Facility: MEDICAL CENTER | Age: 68
End: 2023-09-18
Attending: NURSE PRACTITIONER
Payer: MEDICARE

## 2023-09-18 VITALS
HEIGHT: 63 IN | HEART RATE: 80 BPM | SYSTOLIC BLOOD PRESSURE: 108 MMHG | RESPIRATION RATE: 16 BRPM | BODY MASS INDEX: 25.69 KG/M2 | WEIGHT: 145 LBS | DIASTOLIC BLOOD PRESSURE: 62 MMHG | OXYGEN SATURATION: 95 %

## 2023-09-18 DIAGNOSIS — G47.33 OSA (OBSTRUCTIVE SLEEP APNEA): ICD-10-CM

## 2023-09-18 DIAGNOSIS — G47.19 EXCESSIVE DAYTIME SLEEPINESS: ICD-10-CM

## 2023-09-18 PROCEDURE — 3078F DIAST BP <80 MM HG: CPT | Performed by: NURSE PRACTITIONER

## 2023-09-18 PROCEDURE — 99212 OFFICE O/P EST SF 10 MIN: CPT | Performed by: NURSE PRACTITIONER

## 2023-09-18 PROCEDURE — 3074F SYST BP LT 130 MM HG: CPT | Performed by: NURSE PRACTITIONER

## 2023-09-18 PROCEDURE — 99214 OFFICE O/P EST MOD 30 MIN: CPT | Performed by: NURSE PRACTITIONER

## 2023-09-18 ASSESSMENT — FIBROSIS 4 INDEX: FIB4 SCORE: 1.63
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
## 2023-09-19 ENCOUNTER — OFFICE VISIT (OUTPATIENT)
Dept: MEDICAL GROUP | Facility: PHYSICIAN GROUP | Age: 68
End: 2023-09-19
Payer: MEDICARE

## 2023-09-19 VITALS
WEIGHT: 144.9 LBS | HEIGHT: 63 IN | SYSTOLIC BLOOD PRESSURE: 118 MMHG | DIASTOLIC BLOOD PRESSURE: 64 MMHG | HEART RATE: 64 BPM | TEMPERATURE: 98.6 F | OXYGEN SATURATION: 97 % | BODY MASS INDEX: 25.68 KG/M2

## 2023-09-19 DIAGNOSIS — G47.33 OSA ON CPAP: ICD-10-CM

## 2023-09-19 DIAGNOSIS — R41.3 MEMORY LOSS: ICD-10-CM

## 2023-09-19 PROCEDURE — 99214 OFFICE O/P EST MOD 30 MIN: CPT | Performed by: INTERNAL MEDICINE

## 2023-09-19 PROCEDURE — 3074F SYST BP LT 130 MM HG: CPT | Performed by: INTERNAL MEDICINE

## 2023-09-19 PROCEDURE — 3078F DIAST BP <80 MM HG: CPT | Performed by: INTERNAL MEDICINE

## 2023-09-19 ASSESSMENT — FIBROSIS 4 INDEX: FIB4 SCORE: 1.63
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
## 2023-10-02 ENCOUNTER — TELEPHONE (OUTPATIENT)
Dept: HEALTH INFORMATION MANAGEMENT | Facility: OTHER | Age: 68
End: 2023-10-02
Payer: MEDICARE

## 2023-10-02 DIAGNOSIS — R41.3 MEMORY LOSS: ICD-10-CM

## 2023-10-02 NOTE — TELEPHONE ENCOUNTER
1. Caller Name: Karuna A Mardon   Call Back Number: [phone] (home)        How would the patient prefer to be contacted with a response: Phone call OK to leave a detailed message    2. SPECIFIC Action To Be Taken: Referral pending, please sign.    3. Diagnosis/Clinical Reason for Request: Patient called stating she needs referral to be updated as she was notified by referrals team. According to previous referral Dr. Pham is not a Neurology provider referring. Patient has been seen by Dr. Badillo in the past but, not since 2020. Please advise.     4. Specialty & Provider Name/Lab/Imaging Location: Neurology    5. Is appointment scheduled for requested order/referral: no

## 2023-10-19 ENCOUNTER — OFFICE VISIT (OUTPATIENT)
Dept: NEUROLOGY | Facility: MEDICAL CENTER | Age: 68
End: 2023-10-19
Attending: PSYCHIATRY & NEUROLOGY
Payer: MEDICARE

## 2023-10-19 VITALS
HEART RATE: 76 BPM | BODY MASS INDEX: 25.62 KG/M2 | OXYGEN SATURATION: 97 % | TEMPERATURE: 99.6 F | HEIGHT: 63 IN | DIASTOLIC BLOOD PRESSURE: 76 MMHG | SYSTOLIC BLOOD PRESSURE: 122 MMHG | WEIGHT: 144.62 LBS

## 2023-10-19 DIAGNOSIS — R41.3 MEMORY LOSS: ICD-10-CM

## 2023-10-19 PROCEDURE — 3078F DIAST BP <80 MM HG: CPT | Performed by: PSYCHIATRY & NEUROLOGY

## 2023-10-19 PROCEDURE — 99212 OFFICE O/P EST SF 10 MIN: CPT | Performed by: PSYCHIATRY & NEUROLOGY

## 2023-10-19 PROCEDURE — 99215 OFFICE O/P EST HI 40 MIN: CPT | Performed by: PSYCHIATRY & NEUROLOGY

## 2023-10-19 PROCEDURE — 3074F SYST BP LT 130 MM HG: CPT | Performed by: PSYCHIATRY & NEUROLOGY

## 2023-10-19 ASSESSMENT — ENCOUNTER SYMPTOMS
TINGLING: 0
SPEECH CHANGE: 0
TREMORS: 0
FALLS: 0
DEPRESSION: 0
LOSS OF CONSCIOUSNESS: 0
HALLUCINATIONS: 0
INSOMNIA: 0
MEMORY LOSS: 1

## 2023-10-19 ASSESSMENT — MONTREAL COGNITIVE ASSESSMENT (MOCA)
3. DRAW A CLOCK: CONTOUR, NUMBERS, HANDS: 3/3
11. FOR EACH PAIR OF WORDS, WHAT CATEGORY DO THEY BELONG TO (OUT OF 2): 1/2
5. MEMORY TRIALS: FIRST TRIAL;SECOND TRIAL
WHAT IS THE TOTAL SCORE (OUT OF 30): 20
10. [FLUENCY] NAME WORDS STARTING WITH DESIGNATED LETTER: 0/1
8. SERIAL SUBTRACTION OF 7S: 0/5
ORIENTATION SUBSCORE: 6/6
CATEGORY CUE (IF APPLICABLE): 1
CATEGORY CUE (IF APPLICABLE): 1
2. COPY DRAWING: 1/1
6. READ LIST OF DIGITS [FORWARD/BACKWARD]: 2/2
DELAYED RECALL SUBSCORE: 2/5
7. [VIGILENCE] TAP WHEN HEARING DESIGNATED LETTER: 1/1
WHAT IS THE VERSION OF MOCA ADMINISTERED: 8.1
1. ALTERNATING TRAIL MAKING: 1/1
9. REPEAT EACH SENTENCE: 0/2
4. NAME EACH OF THE THREE ANIMALS SHOWN: 3/3
ADD 1 POINT IF LESS THAN OR EQUAL TO 12 YR EDUCATION LEVEL: 0

## 2023-10-19 ASSESSMENT — FIBROSIS 4 INDEX: FIB4 SCORE: 1.63

## 2023-10-19 NOTE — Clinical Note
4, this is a patient I am forwarding you for your routine neuropsychological evaluations.  I have a strong level of suspicion she does have dementia, we are simply trying to characterize its nature.  Jorge

## 2023-10-19 NOTE — PROGRESS NOTES
Robbie Reid is a 67 y.o. female who presents for follow-up, seen just under 3 years ago, for complaints of cognitive impairment and whose cognitive deficits seem to be worsening.  Unfortunately, she presents today without her .     OSCAR Beckman states that the cognitive symptoms have continued to worsen slowly and steadily.  Language still is most noticeable to her, she has more more difficulty with word finding in conversation, even comprehension is slowed.  She finds himself being left behind in conversation more often.  She often has to reread or have somebody repeat themselves a little more often.  She still can get the whole picture it is just frustratingly slow.    Short-term memory is also more curtailed, she does require more frequent reminders, she is now created structure which helps in this regard.  She is not repeating herself.  He uses a calendar, in fact she has 2 of them, to remember appointments, etc.  If she does not write it down, it is much more likely to be forgotten.  She is using lists much more often as well, and if she gets distracted off the list she has in hand, she is much more likely to leave things incomplete or forgotten.    She does complete routine tasks consistently though it seems to be taking her little longer.  She has not done some unusual or dangerous things at home such as leave the house with the water running, stove on, etc.  She keeps the house in good order, clean, she still can cook and use the stove another kitchen appliances without issue.  She is finding things where they should be.    She drives locally and in safe fashion.  She does use GPS much more often.  She does take care of her supplements and medications appropriately.    She feels that her mood is stable, she denies feelings of depression, her  has told her she is getting a little bit more irritable at times.  She still enjoys herself when she is out with others.    She  "sleeps easily, there are no problems with bad nightmares, and during the daytime there are no issues with hallucinations or irritation and significant confusion.    When she was seen back in November, 2020, MRI of the brain was normal, PET scan of the brain revealed normal metabolic activity throughout.  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  (It is her mother's family who has multiple members with Alzheimer's disease.)    Medical, surgical and family histories are reviewed, there are no new drug allergies.  She is on a long list of vitamin and mineral supplements that includes vitamin D, omega-3 fish oil, lysine, vitamin B12, copper, calcium and biotin.    Review of Systems   Musculoskeletal:  Negative for falls.   Neurological:  Negative for tingling, tremors, speech change and loss of consciousness.   Psychiatric/Behavioral:  Positive for memory loss. Negative for depression and hallucinations. The patient does not have insomnia.    All other systems reviewed and are negative.    Objective     /76 (BP Location: Right arm, Patient Position: Sitting, BP Cuff Size: Adult)   Pulse 76   Temp 37.6 °C (99.6 °F) (Temporal)   Ht 1.6 m (5' 3\")   Wt 65.6 kg (144 lb 10 oz)   SpO2 97%   BMI 25.62 kg/m²      Physical Exam    She appears in no acute distress.  Vital signs are stable.  She is clean and appropriately dressed, quite cooperative.  She maintains good eye contact with the examiner throughout the interview.  There is no malar rash or jaw claudication.  The neck is supple.  Cardiac evaluation is unremarkable.     Neurological Exam    She is fully oriented.  Her mental processing speed is a bit slowed, there is no apraxia, or inattention.  MoCA is now 20/30 (from 3 years ago it was 25/30).  Her mood is euthymic, affect is mood appropriate.  The difficulties now seem to be more language-based, the visuospatial and executive " problems she had before actually seem to be better.  Still, there is also some of the memory encoding issues.    PERRLA/EOMI, visual fields are full, facial movements are symmetric, there is no bulbar dysfunction.  Sensory exam is intact to temperature.  The tongue and uvula are midline.  Shoulder shrug and head rotation are normal.    Musculoskeletal exam reveals normal tone throughout, there is no tremor or drift.  Strength is intact.  Reflexes are brisk and present throughout, there are no asymmetries and none are dropped.  The toes are downgoing.    She stands easily, with passive observation, gait is normal and station and stride length.  There is no appendicular dystaxia.  Fine motor control is intact in all 4 extremities.    Sensory exam is intact to vibration and temperature.    Assessment & Plan     1. Memory loss  I am a little more concerned about the cognitive deficits that she is now experiencing with regularity.  The work-up for treatable causes of cognitive impairment has essentially been completed.  We might consider a repeat PET scan moving forwards, but first neuropsychological testing will be critical to better characterize the nature and severity of the deficits that she is having.  Her MoCA score does indicate she has cognitive impairment, in all likelihood this will likely prove to be mild dementia.  She has significant risk given the family history, but the heterozygous APO-e genotyping is nonspecific.    For now she was told to continue her routines that include physical activity, socializing, cognitive activities, healthy lifestyle including diet (Mediterranean style) and sleep hygiene (she is using CPAP for her FREDDY).  She and I will follow-up in the next 5 months or so, by that time neuropsychological testing should be completed.  It is then that we can talk about treatment options.    - Referral to Neurology    Time: 60 minutes in total spent on patient care including pre-charting, record  review, discussion with healthcare staff and documentation.  This includes face-to-face time for exam, review, discussion, as well as counseling and coordinating care.

## 2023-10-30 ENCOUNTER — OFFICE VISIT (OUTPATIENT)
Dept: SLEEP MEDICINE | Facility: MEDICAL CENTER | Age: 68
End: 2023-10-30
Attending: NURSE PRACTITIONER
Payer: MEDICARE

## 2023-10-30 VITALS
OXYGEN SATURATION: 94 % | HEIGHT: 63 IN | SYSTOLIC BLOOD PRESSURE: 116 MMHG | RESPIRATION RATE: 16 BRPM | WEIGHT: 143 LBS | BODY MASS INDEX: 25.34 KG/M2 | DIASTOLIC BLOOD PRESSURE: 68 MMHG | HEART RATE: 84 BPM

## 2023-10-30 DIAGNOSIS — G47.19 EXCESSIVE DAYTIME SLEEPINESS: ICD-10-CM

## 2023-10-30 DIAGNOSIS — G47.33 OSA (OBSTRUCTIVE SLEEP APNEA): ICD-10-CM

## 2023-10-30 PROCEDURE — 99212 OFFICE O/P EST SF 10 MIN: CPT | Performed by: NURSE PRACTITIONER

## 2023-10-30 PROCEDURE — 3074F SYST BP LT 130 MM HG: CPT | Performed by: NURSE PRACTITIONER

## 2023-10-30 PROCEDURE — 99213 OFFICE O/P EST LOW 20 MIN: CPT | Performed by: NURSE PRACTITIONER

## 2023-10-30 PROCEDURE — 3078F DIAST BP <80 MM HG: CPT | Performed by: NURSE PRACTITIONER

## 2023-10-30 RX ORDER — AMOXICILLIN 500 MG/1
CAPSULE ORAL
COMMUNITY
Start: 2023-09-12 | End: 2024-01-05

## 2023-10-30 RX ORDER — DEXAMETHASONE 4 MG/1
TABLET ORAL
COMMUNITY
Start: 2023-09-12 | End: 2024-01-05

## 2023-10-30 RX ORDER — DIAZEPAM 5 MG/1
TABLET ORAL
COMMUNITY
Start: 2023-09-12 | End: 2024-01-05

## 2023-10-30 ASSESSMENT — FIBROSIS 4 INDEX: FIB4 SCORE: 1.63

## 2023-10-30 NOTE — PROGRESS NOTES
"Chief Complaint   Patient presents with    Follow-Up     LAST SEEN 09/18/2023 ZAIN SZYMANSKI  F/V SLEEP       HPI:  Karuna Reid is a 67 y.o. year old female here today for follow-up on FREDDY follow-up.  Last seen by me on 9/18/2023 for annual FREDDY.  Patient presented with chief complaints of excessive daytime sleepiness, snoring, but no witnessed apneas or gasping.  Previous [place] sleepiness score of 15.     Patient is currently using an AirFit N30i nasal mask.  Is also using heated tubing.  Is averaging 5 hours of sleep and denies any difficulty falling or staying asleep.  She notes overall improvement with sleep quality using CPAP.  She denies any new morning headaches, heart palpitations, concentration or memory problems.  She does endorse several episodes of \"blacking out\" behind the wheel of an automobile.  She denies being involved in any auto accidents.  No recent recurrent episodes since last visit. She denies any symptoms compatible with narcolepsy including sleep paralysis, hallucinations, or falling asleep abruptly with laughing or being startled.  Previously, I ordered a titration study with intent to complete an MSLT, but patient has reconsidered and does not want to proceed with testing at this time.  She does state that she goes to sleep around 1 AM and wakes up around 7 AM.  She feels that she does not get enough sleep and that may be the cause of her fatigue and tiredness.  She is going to start going to sleep earlier with the intent to see if the symptoms resolved.     30-day compliance reviewed with patient shows 100% use with an average time of 5 hours and 44 minutes and a resultant AHI of 1.8 with no evidence of persistent mask leak.     Sleep history:  Split-night sleep study(12/4/2022):  1.  Moderate obstructive sleep apnea with overall AHI of 15.3. Mild nocturnal hypoxia likely secondary to untreated sleep apnea minimum oxygen saturation 82% during diagnostic portion, time at or below 88% " saturation 16.6 minutes.  Patient was started on auto CPAP 6-7 cm. .           ROS: As per HPI and otherwise negative if not stated.    Past Medical History:   Diagnosis Date    Abdominal pain     Altered consciousness 03/17/2022    Arthritis     Asthma     Bowel habit changes     constipation    Chest tightness     Chickenpox     Cough     Daytime sleepiness     Dental disorder     implant     Dyslipidemia 07/13/2021    Frequent urination     GERD (gastroesophageal reflux disease)     Influenza     Menopausal and postmenopausal disorder     Muscle disorder     Osteoporosis     Peripheral vascular disease, unspecified (HCC) 07/13/2021    Shortness of breath     Swelling of lower extremity     Tonsillitis     Urinary bladder disorder     Vitamin D deficiency 02/15/2022    Wears glasses     Weight gain 03/17/2022       Past Surgical History:   Procedure Laterality Date    PB INCISE FINGER TENDON SHEATH Right 1/27/2023    Procedure: RIGHT MIDDLE FINGER TRIGGER RELEASE, REPAIRS AS INDICATED;  Surgeon: Leeele Jackson M.D.;  Location: [facility];  Service: Orthopedics    VA LAP,DIAGNOSTIC ABDOMEN  06/16/2021    Procedure: diagnostic laparoscopy;  Surgeon: Librado Rosenberg M.D.;  Location: SURGERY SAME DAY [facility];  Service: Gynecology    MASS EXCISION ORTHO Left 07/17/2020    Procedure: EXCISION, MASS-FOOT soft tissue TUMOR;  Surgeon: CHRISTINA WisdomPMichaelMMichael;  Location: SURGERY SAME DAY [facility];  Service: Podiatry    CYSTOSCOPY  10/19/2016    Procedure: CYSTOSCOPY;  Surgeon: Lenny Cazares M.D.;  Location: [facility];  Service:     HYSTERECTOMY ROBOTIC  10/19/2016    Procedure: HYSTERECTOMY ROBOTIC Total with BILATERAL SALPINGO-OOPHORECTOMY;  Surgeon: Lenny Cazares M.D.;  Location: [facility];  Service:     ANTERIOR AND POSTERIOR REPAIR  10/19/2016    Procedure: POSTERIOR REPAIR;  Surgeon: Lenny Cazares M.D.;  Location: [facility];   "Service:     VAGINAL SUSPENSION  10/19/2016    Procedure: VAGINAL SUSPENSION Uterosacral, and  McCalls Culdoplasty;  Surgeon: Lenny Cazares M.D.;  Location: SURGERY [facility];  Service:     TONSILLECTOMY  2014    Performed by Nirali Dos Santos M.D. at SURGERY [facility]    OTHER SURGICAL PROCEDURE      Dr Doss; Neck superficial exploration    TONSILLECTOMY      HYSTERECTOMY LAPAROSCOPY      OTHER      Throat exploration    OTHER ORTHOPEDIC SURGERY Left     mass excision       Family History   Problem Relation Age of Onset    Psychiatric Illness Mother         Alzheimer's    Alzheimer's Disease Mother          of Alzheimer's Disease    Cancer Maternal Aunt         Skin, Breast,Liver    Cancer Maternal Aunt         Cancer    Cancer Maternal Uncle         Blood Cancer       Allergies as of 10/30/2023 - Reviewed 10/30/2023   Allergen Reaction Noted    Hydrocodone Unspecified 2021    Other environmental Swelling 2021    Prednisone Swelling 2020    Other drug Unspecified 2016        Vitals:  /68 (BP Location: Left arm, Patient Position: Sitting, BP Cuff Size: Adult)   Pulse 84   Resp 16   Ht 1.6 m (5' 3\")   Wt 64.9 kg (143 lb)   SpO2 94%     Current medications as of today   Current Outpatient Medications   Medication Sig Dispense Refill    lysine 500 MG Tab Take 1 Tablet by mouth every day.      Cyanocobalamin (B-12) 500 MCG Tab Take 500 mcg by mouth every day.      Tetrahydrozoline-Zn Sulfate (EYE DROPS AR OP) Administer  into affected eye(s) every day.      vitamin E 200 UNIT capsule Take 2 Capsules by mouth every day.      Copper 5 MG Tab Take 0.6 Tablets by mouth every day.      Omega-3 350 MG CAPSULE DELAYED RELEASE Take 360 mg by mouth every day.      Multiple Vitamins-Minerals (MULTIVITAMIN ADULT PO) Take  by mouth.      Biotin 2500 MCG Cap Take  by mouth.      calcium carbonate (TUMS) 500 MG Chew Tab Chew 1 Tablet every day.      amoxicillin " (AMOXIL) 500 MG Cap START TAKING 24 HOURS PRIOR TO SURGERY THEN 1 CAPSULE EVERY 8 HOURS UNTIL GONE. (Patient not taking: Reported on 10/30/2023)      dexamethasone (DECADRON) 4 MG Tab TAKE 2 TABLETS 1 HOUR PRIOR TO SURGERY. (Patient not taking: Reported on 10/30/2023)      diazePAM (VALIUM) 5 MG Tab TAKE 2 TABLETS THE NIGHT PRIOR TO SURGERY THEN 2 TABLETS 1 HOUR PRIOR TO SURGERY. (Patient not taking: Reported on 10/30/2023)       No current facility-administered medications for this visit.         Physical Exam:   Gen:           Alert and oriented, No apparent distress. Mood and affect appropriate, normal interaction with examiner.  Eyes:          PERRL, EOM intact, sclere white, conjunctive moist.  Ears:          Not examined.   Hearing:     Grossly intact.  Nose:          Normal, no lesions or deformities.  Dentition:    Good dentition.  Oropharynx:   Tongue normal, posterior pharynx without erythema or exudate.  Neck:        Supple, trachea midline, no masses.  Respiratory Effort: No intercostal retractions or use of accessory muscles.   Lung Auscultation:      Clear to auscultation bilaterally; no rales, rhonchi or wheezing.  CV:            Regular rate and rhythm. No murmurs, rubs or gallops.  Abd:           Not examined.   Lymphadenopathy: Not examined.  Gait and Station: Normal.  Digits and Nails: No clubbing, cyanosis, petechiae, or nodes.   Cranial Nerves: II-XII grossly intact.  Skin:        No rashes, lesions or ulcers noted.               Ext:           No cyanosis or edema.      Assessment:  1. FREDDY (obstructive sleep apnea)  DME Mask Fitting      2. Excessive daytime sleepiness  DME Mask Fitting        Plan:  Patient continues to use and benefit from CPAP therapy.  She does have episodes of fatigue and blacking out behind the wheel which have not occurred since last visit.  I did order MSLT and titration, but patient would like to reconsider and not move forward with testing at this time.  She intends  to start going to sleep earlier to see if that helps.  She is only currently getting 6 hours of sleep nightly.  She does nap during the day as well.  Was advised to refrain from driving if these episodes continue.  Patient will follow-up in 10 months for annual FREDDY, but can be seen sooner if needed.  Order placed for mask fitting for patient as she states her current mask slips out of place frequently.    Please note that this dictation was created using voice recognition software. I have made every reasonable attempt to correct obvious errors, but it is possible there are errors of grammar and possibly content that I did not discover before finalizing the note.

## 2023-11-30 ENCOUNTER — APPOINTMENT (RX ONLY)
Dept: URBAN - METROPOLITAN AREA CLINIC 6 | Facility: CLINIC | Age: 68
Setting detail: DERMATOLOGY
End: 2023-11-30

## 2023-11-30 DIAGNOSIS — D18.0 HEMANGIOMA: ICD-10-CM

## 2023-11-30 DIAGNOSIS — L81.4 OTHER MELANIN HYPERPIGMENTATION: ICD-10-CM

## 2023-11-30 DIAGNOSIS — T07XXXA ABRASION OR FRICTION BURN OF OTHER, MULTIPLE, AND UNSPECIFIED SITES, WITHOUT MENTION OF INFECTION: ICD-10-CM

## 2023-11-30 DIAGNOSIS — Z87.2 PERSONAL HISTORY OF DISEASES OF THE SKIN AND SUBCUTANEOUS TISSUE: ICD-10-CM

## 2023-11-30 DIAGNOSIS — D22 MELANOCYTIC NEVI: ICD-10-CM

## 2023-11-30 DIAGNOSIS — L82.1 OTHER SEBORRHEIC KERATOSIS: ICD-10-CM

## 2023-11-30 DIAGNOSIS — Z71.89 OTHER SPECIFIED COUNSELING: ICD-10-CM

## 2023-11-30 PROBLEM — D22.5 MELANOCYTIC NEVI OF TRUNK: Status: ACTIVE | Noted: 2023-11-30

## 2023-11-30 PROBLEM — D22.71 MELANOCYTIC NEVI OF RIGHT LOWER LIMB, INCLUDING HIP: Status: ACTIVE | Noted: 2023-11-30

## 2023-11-30 PROBLEM — S80.922A UNSPECIFIED SUPERFICIAL INJURY OF LEFT LOWER LEG, INITIAL ENCOUNTER: Status: ACTIVE | Noted: 2023-11-30

## 2023-11-30 PROBLEM — D18.01 HEMANGIOMA OF SKIN AND SUBCUTANEOUS TISSUE: Status: ACTIVE | Noted: 2023-11-30

## 2023-11-30 PROBLEM — D23.5 OTHER BENIGN NEOPLASM OF SKIN OF TRUNK: Status: ACTIVE | Noted: 2023-11-30

## 2023-11-30 PROBLEM — D23.39 OTHER BENIGN NEOPLASM OF SKIN OF OTHER PARTS OF FACE: Status: ACTIVE | Noted: 2023-11-30

## 2023-11-30 PROCEDURE — ? DEFER

## 2023-11-30 PROCEDURE — ? ADDITIONAL NOTES

## 2023-11-30 PROCEDURE — 99213 OFFICE O/P EST LOW 20 MIN: CPT

## 2023-11-30 PROCEDURE — ? COUNSELING

## 2023-11-30 PROCEDURE — ? SUNSCREEN TREATMENT REGIMEN

## 2023-11-30 ASSESSMENT — LOCATION ZONE DERM
LOCATION ZONE: FEET
LOCATION ZONE: HAND
LOCATION ZONE: FACE
LOCATION ZONE: TRUNK
LOCATION ZONE: LEG

## 2023-11-30 ASSESSMENT — LOCATION SIMPLE DESCRIPTION DERM
LOCATION SIMPLE: UPPER BACK
LOCATION SIMPLE: ABDOMEN
LOCATION SIMPLE: LEFT HAND
LOCATION SIMPLE: RIGHT PLANTAR SURFACE
LOCATION SIMPLE: LEFT THIGH
LOCATION SIMPLE: RIGHT HAND
LOCATION SIMPLE: LEFT PRETIBIAL REGION
LOCATION SIMPLE: CHEST
LOCATION SIMPLE: LEFT FOREHEAD

## 2023-11-30 ASSESSMENT — LOCATION DETAILED DESCRIPTION DERM
LOCATION DETAILED: LEFT POSTERIOR LATERAL DISTAL THIGH
LOCATION DETAILED: LEFT INFERIOR FOREHEAD
LOCATION DETAILED: RIGHT RADIAL DORSAL HAND
LOCATION DETAILED: UPPER STERNUM
LOCATION DETAILED: EPIGASTRIC SKIN
LOCATION DETAILED: LEFT PROXIMAL PRETIBIAL REGION
LOCATION DETAILED: SUPERIOR THORACIC SPINE
LOCATION DETAILED: LEFT ULNAR DORSAL HAND
LOCATION DETAILED: RIGHT MEDIAL PLANTAR MIDFOOT
LOCATION DETAILED: LEFT RADIAL DORSAL HAND
LOCATION DETAILED: PERIUMBILICAL SKIN

## 2023-11-30 NOTE — PROCEDURE: ADDITIONAL NOTES
Render Risk Assessment In Note?: no
Detail Level: Simple
Additional Notes: Advised pt to call if no improvement

## 2023-12-19 ENCOUNTER — TELEPHONE (OUTPATIENT)
Dept: HEALTH INFORMATION MANAGEMENT | Facility: OTHER | Age: 68
End: 2023-12-19
Payer: MEDICARE

## 2023-12-19 ENCOUNTER — TELEPHONE (OUTPATIENT)
Dept: MEDICAL GROUP | Facility: PHYSICIAN GROUP | Age: 68
End: 2023-12-19
Payer: MEDICARE

## 2023-12-19 DIAGNOSIS — Z78.0 MENOPAUSE: ICD-10-CM

## 2023-12-19 NOTE — TELEPHONE ENCOUNTER
1. Caller Name: Karuna Reid                         Call Back Number: [phone]       How would the patient prefer to be contacted with a response: Yu Ronghart message    2. SPECIFIC Action To Be Taken: Orders pending, please sign.    3. Diagnosis/Clinical Reason for Request: Bone density.    4. Specialty & Provider Name/Lab/Imaging Location: [facility].    5. Is appointment scheduled for requested order/referral: no    Patient was informed they will receive a return phone call from the office ONLY if there are any questions before processing their request. Advised to call back if they haven't received a call from the referral department in 5 days.

## 2024-01-02 ENCOUNTER — APPOINTMENT (RX ONLY)
Dept: URBAN - METROPOLITAN AREA CLINIC 6 | Facility: CLINIC | Age: 69
Setting detail: DERMATOLOGY
End: 2024-01-02

## 2024-01-02 DIAGNOSIS — D485 NEOPLASM OF UNCERTAIN BEHAVIOR OF SKIN: ICD-10-CM

## 2024-01-02 PROBLEM — D48.5 NEOPLASM OF UNCERTAIN BEHAVIOR OF SKIN: Status: ACTIVE | Noted: 2024-01-02

## 2024-01-02 PROCEDURE — 11102 TANGNTL BX SKIN SINGLE LES: CPT

## 2024-01-02 PROCEDURE — ? BIOPSY BY SHAVE METHOD

## 2024-01-02 ASSESSMENT — LOCATION DETAILED DESCRIPTION DERM: LOCATION DETAILED: LEFT DISTAL PRETIBIAL REGION

## 2024-01-02 ASSESSMENT — LOCATION ZONE DERM: LOCATION ZONE: LEG

## 2024-01-02 ASSESSMENT — LOCATION SIMPLE DESCRIPTION DERM: LOCATION SIMPLE: LEFT PRETIBIAL REGION

## 2024-01-05 PROBLEM — M85.852 OSTEOPENIA OF NECK OF LEFT FEMUR: Status: ACTIVE | Noted: 2018-01-22

## 2024-01-16 ENCOUNTER — OFFICE VISIT (OUTPATIENT)
Dept: MEDICAL GROUP | Facility: PHYSICIAN GROUP | Age: 69
End: 2024-01-16
Payer: MEDICARE

## 2024-01-16 VITALS
HEART RATE: 73 BPM | OXYGEN SATURATION: 92 % | SYSTOLIC BLOOD PRESSURE: 112 MMHG | BODY MASS INDEX: 25.48 KG/M2 | DIASTOLIC BLOOD PRESSURE: 72 MMHG | TEMPERATURE: 98.6 F | WEIGHT: 143.8 LBS | RESPIRATION RATE: 16 BRPM | HEIGHT: 63 IN

## 2024-01-16 DIAGNOSIS — G47.33 OSA ON CPAP: ICD-10-CM

## 2024-01-16 DIAGNOSIS — R41.3 MEMORY LOSS: ICD-10-CM

## 2024-01-16 DIAGNOSIS — E55.9 VITAMIN D DEFICIENCY: ICD-10-CM

## 2024-01-16 DIAGNOSIS — E78.5 DYSLIPIDEMIA: ICD-10-CM

## 2024-01-16 PROBLEM — M79.605 LEFT LEG PAIN: Status: RESOLVED | Noted: 2021-04-27 | Resolved: 2024-01-16

## 2024-01-16 PROBLEM — R10.84 GENERALIZED ABDOMINAL PAIN: Status: RESOLVED | Noted: 2020-09-17 | Resolved: 2024-01-16

## 2024-01-16 PROBLEM — R55 BRIEF LOSS OF CONSCIOUSNESS: Status: RESOLVED | Noted: 2022-03-09 | Resolved: 2024-01-16

## 2024-01-16 PROCEDURE — 3074F SYST BP LT 130 MM HG: CPT | Performed by: INTERNAL MEDICINE

## 2024-01-16 PROCEDURE — 99214 OFFICE O/P EST MOD 30 MIN: CPT | Performed by: INTERNAL MEDICINE

## 2024-01-16 PROCEDURE — 3078F DIAST BP <80 MM HG: CPT | Performed by: INTERNAL MEDICINE

## 2024-01-16 RX ORDER — VITAMIN B COMPLEX
1000 TABLET ORAL DAILY
COMMUNITY

## 2024-01-16 RX ORDER — KRILL/OM-3/DHA/EPA/PHOSPHO/AST 500MG-86MG
1 CAPSULE ORAL DAILY
COMMUNITY

## 2024-01-16 RX ORDER — ASCORBIC ACID 500 MG
500 TABLET ORAL DAILY
COMMUNITY

## 2024-01-16 RX ORDER — VIT C/HESPERIDIN/BIOFLAVONOIDS 500-100 MG
30 TABLET ORAL DAILY
COMMUNITY

## 2024-01-16 ASSESSMENT — FIBROSIS 4 INDEX: FIB4 SCORE: 1.66
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
## 2024-01-25 ENCOUNTER — HOSPITAL ENCOUNTER (OUTPATIENT)
Dept: RADIOLOGY | Facility: MEDICAL CENTER | Age: 69
End: 2024-01-25
Attending: INTERNAL MEDICINE
Payer: MEDICARE

## 2024-01-25 DIAGNOSIS — Z78.0 MENOPAUSE: ICD-10-CM

## 2024-01-25 PROCEDURE — 77080 DXA BONE DENSITY AXIAL: CPT

## 2024-02-12 ENCOUNTER — TELEPHONE (OUTPATIENT)
Dept: HEALTH INFORMATION MANAGEMENT | Facility: OTHER | Age: 69
End: 2024-02-12
Payer: MEDICARE

## 2024-03-07 ENCOUNTER — HOSPITAL ENCOUNTER (OUTPATIENT)
Dept: RADIOLOGY | Facility: MEDICAL CENTER | Age: 69
End: 2024-03-07
Attending: INTERNAL MEDICINE
Payer: MEDICARE

## 2024-03-07 DIAGNOSIS — Z12.31 VISIT FOR SCREENING MAMMOGRAM: ICD-10-CM

## 2024-03-07 PROCEDURE — 77067 SCR MAMMO BI INCL CAD: CPT

## 2024-03-20 ENCOUNTER — HOSPITAL ENCOUNTER (OUTPATIENT)
Dept: LAB | Facility: MEDICAL CENTER | Age: 69
End: 2024-03-20
Attending: INTERNAL MEDICINE
Payer: MEDICARE

## 2024-03-20 DIAGNOSIS — R41.3 MEMORY LOSS: ICD-10-CM

## 2024-03-20 DIAGNOSIS — E55.9 VITAMIN D DEFICIENCY: ICD-10-CM

## 2024-03-20 DIAGNOSIS — E78.5 DYSLIPIDEMIA: ICD-10-CM

## 2024-03-20 LAB
25(OH)D3 SERPL-MCNC: 40 NG/ML (ref 30–100)
ALBUMIN SERPL BCP-MCNC: 4.1 G/DL (ref 3.2–4.9)
ALBUMIN/GLOB SERPL: 1.2 G/DL
ALP SERPL-CCNC: 88 U/L (ref 30–99)
ALT SERPL-CCNC: 15 U/L (ref 2–50)
ANION GAP SERPL CALC-SCNC: 9 MMOL/L (ref 7–16)
AST SERPL-CCNC: 21 U/L (ref 12–45)
BASOPHILS # BLD AUTO: 0.6 % (ref 0–1.8)
BASOPHILS # BLD: 0.03 K/UL (ref 0–0.12)
BILIRUB SERPL-MCNC: 0.5 MG/DL (ref 0.1–1.5)
BUN SERPL-MCNC: 16 MG/DL (ref 8–22)
CALCIUM ALBUM COR SERPL-MCNC: 9.9 MG/DL (ref 8.5–10.5)
CALCIUM SERPL-MCNC: 10 MG/DL (ref 8.5–10.5)
CHLORIDE SERPL-SCNC: 107 MMOL/L (ref 96–112)
CHOLEST SERPL-MCNC: 182 MG/DL (ref 100–199)
CO2 SERPL-SCNC: 25 MMOL/L (ref 20–33)
CREAT SERPL-MCNC: 0.71 MG/DL (ref 0.5–1.4)
EOSINOPHIL # BLD AUTO: 0.1 K/UL (ref 0–0.51)
EOSINOPHIL NFR BLD: 2 % (ref 0–6.9)
ERYTHROCYTE [DISTWIDTH] IN BLOOD BY AUTOMATED COUNT: 44.8 FL (ref 35.9–50)
FASTING STATUS PATIENT QL REPORTED: NORMAL
FOLATE SERPL-MCNC: 30.6 NG/ML
GFR SERPLBLD CREATININE-BSD FMLA CKD-EPI: 92 ML/MIN/1.73 M 2
GLOBULIN SER CALC-MCNC: 3.3 G/DL (ref 1.9–3.5)
GLUCOSE SERPL-MCNC: 95 MG/DL (ref 65–99)
HCT VFR BLD AUTO: 42.6 % (ref 37–47)
HDLC SERPL-MCNC: 65 MG/DL
HGB BLD-MCNC: 14.4 G/DL (ref 12–16)
IMM GRANULOCYTES # BLD AUTO: 0.01 K/UL (ref 0–0.11)
IMM GRANULOCYTES NFR BLD AUTO: 0.2 % (ref 0–0.9)
LDLC SERPL CALC-MCNC: 102 MG/DL
LYMPHOCYTES # BLD AUTO: 1.75 K/UL (ref 1–4.8)
LYMPHOCYTES NFR BLD: 34.4 % (ref 22–41)
MCH RBC QN AUTO: 30.1 PG (ref 27–33)
MCHC RBC AUTO-ENTMCNC: 33.8 G/DL (ref 32.2–35.5)
MCV RBC AUTO: 89.1 FL (ref 81.4–97.8)
MONOCYTES # BLD AUTO: 0.44 K/UL (ref 0–0.85)
MONOCYTES NFR BLD AUTO: 8.6 % (ref 0–13.4)
NEUTROPHILS # BLD AUTO: 2.76 K/UL (ref 1.82–7.42)
NEUTROPHILS NFR BLD: 54.2 % (ref 44–72)
NRBC # BLD AUTO: 0 K/UL
NRBC BLD-RTO: 0 /100 WBC (ref 0–0.2)
PLATELET # BLD AUTO: 184 K/UL (ref 164–446)
PMV BLD AUTO: 11.2 FL (ref 9–12.9)
POTASSIUM SERPL-SCNC: 4.3 MMOL/L (ref 3.6–5.5)
PROT SERPL-MCNC: 7.4 G/DL (ref 6–8.2)
RBC # BLD AUTO: 4.78 M/UL (ref 4.2–5.4)
SODIUM SERPL-SCNC: 141 MMOL/L (ref 135–145)
TRIGL SERPL-MCNC: 73 MG/DL (ref 0–149)
TSH SERPL DL<=0.005 MIU/L-ACNC: 3.14 UIU/ML (ref 0.38–5.33)
VIT B12 SERPL-MCNC: 811 PG/ML (ref 211–911)
WBC # BLD AUTO: 5.1 K/UL (ref 4.8–10.8)

## 2024-03-20 PROCEDURE — 82746 ASSAY OF FOLIC ACID SERUM: CPT

## 2024-03-20 PROCEDURE — 82607 VITAMIN B-12: CPT

## 2024-03-20 PROCEDURE — 80053 COMPREHEN METABOLIC PANEL: CPT

## 2024-03-20 PROCEDURE — 36415 COLL VENOUS BLD VENIPUNCTURE: CPT

## 2024-03-20 PROCEDURE — 85025 COMPLETE CBC W/AUTO DIFF WBC: CPT

## 2024-03-20 PROCEDURE — 84443 ASSAY THYROID STIM HORMONE: CPT

## 2024-03-20 PROCEDURE — 82306 VITAMIN D 25 HYDROXY: CPT

## 2024-03-20 PROCEDURE — 80061 LIPID PANEL: CPT

## 2024-08-20 ENCOUNTER — OFFICE VISIT (OUTPATIENT)
Dept: NEUROLOGY | Facility: MEDICAL CENTER | Age: 69
End: 2024-08-20
Attending: CLINICAL NEUROPSYCHOLOGIST
Payer: MEDICARE

## 2024-08-20 VITALS
HEIGHT: 63 IN | TEMPERATURE: 98.5 F | HEART RATE: 77 BPM | DIASTOLIC BLOOD PRESSURE: 56 MMHG | OXYGEN SATURATION: 96 % | SYSTOLIC BLOOD PRESSURE: 122 MMHG | BODY MASS INDEX: 25.51 KG/M2 | WEIGHT: 143.96 LBS

## 2024-08-20 DIAGNOSIS — G31.84 MILD COGNITIVE IMPAIRMENT: ICD-10-CM

## 2024-08-20 PROCEDURE — 96139 PSYCL/NRPSYC TST TECH EA: CPT | Performed by: CLINICAL NEUROPSYCHOLOGIST

## 2024-08-20 PROCEDURE — 96138 PSYCL/NRPSYC TECH 1ST: CPT | Performed by: CLINICAL NEUROPSYCHOLOGIST

## 2024-08-20 ASSESSMENT — FIBROSIS 4 INDEX: FIB4 SCORE: 2.033316256758893865

## 2024-08-20 NOTE — PROGRESS NOTES
A clinical interview was conducted with the patient. Following this, the patient underwent a comprehensive neuropsychological evaluation.   Bowel habit changes     constipation    Chest tightness     Chickenpox     Cough     Daytime sleepiness     Dental disorder     implant     Dyslipidemia 07/13/2021    Frequent urination     GERD (gastroesophageal reflux disease)     Influenza     Menopausal and postmenopausal disorder     Muscle disorder     Osteoporosis     Peripheral vascular disease, unspecified (HCC) 07/13/2021    Shortness of breath     Swelling of lower extremity     Tonsillitis     Urinary bladder disorder     Vitamin D deficiency 02/15/2022    Wears glasses     Weight gain 03/17/2022      Past Surgical History:   Procedure Laterality Date    PB INCISE FINGER TENDON SHEATH Right 1/27/2023    Procedure: RIGHT MIDDLE FINGER TRIGGER RELEASE, REPAIRS AS INDICATED;  Surgeon: Leelee Jackson M.D.;  Location: [facility];  Service: Orthopedics    ME LAP,DIAGNOSTIC ABDOMEN  06/16/2021    Procedure: diagnostic laparoscopy;  Surgeon: Librado Rosenberg M.D.;  Location: SURGERY SAME DAY [facility];  Service: Gynecology    MASS EXCISION ORTHO Left 07/17/2020    Procedure: EXCISION, MASS-FOOT soft tissue TUMOR;  Surgeon: JESSE Wisdom.P.MMichael;  Location: SURGERY SAME DAY [facility];  Service: Podiatry    CYSTOSCOPY  10/19/2016    Procedure: CYSTOSCOPY;  Surgeon: Lenny Cazares M.D.;  Location: SURGERY [facility];  Service:     HYSTERECTOMY ROBOTIC  10/19/2016    Procedure: HYSTERECTOMY ROBOTIC Total with BILATERAL SALPINGO-OOPHORECTOMY;  Surgeon: Lenny Cazares M.D.;  Location: SURGERY [facility];  Service:     ANTERIOR AND POSTERIOR REPAIR  10/19/2016    Procedure: POSTERIOR REPAIR;  Surgeon: Lenny Cazares M.D.;  Location: SURGERY [facility];  Service:     VAGINAL SUSPENSION  10/19/2016    Procedure: VAGINAL SUSPENSION Uterosacral, and  McCalls Culdoplasty;  Surgeon: Lenny Cazares M.D.;  Location: SURGERY [facility];  Service:     TONSILLECTOMY  04/18/2014    Performed by Nirali Dos Santos M.D. at  SURGERY [facility]    OTHER SURGICAL PROCEDURE      Dr Doss; Neck superficial exploration    TONSILLECTOMY      HYSTERECTOMY LAPAROSCOPY      OTHER      Throat exploration    OTHER ORTHOPEDIC SURGERY Left     mass excision      Family History   Problem Relation Age of Onset    Psychiatric Illness Mother         Alzheimer's    Alzheimer's Disease Mother          of Alzheimer's Disease    Cancer Maternal Aunt         Skin, Breast,Liver    Cancer Maternal Aunt         Cancer    Cancer Maternal Uncle         Blood Cancer        Current Outpatient Medications:     Krill Oil 500 MG Cap, Take 1 mg by mouth every day., Disp: , Rfl:     vitamin D3 (CHOLECALCIFEROL) 1000 Unit (25 mcg) Tab, Take 1,000 Units by mouth every day. 2 in the morning, 1 at dinner, Disp: , Rfl:     ascorbic acid (VITAMIN C) 500 MG tablet, Take 500 mg by mouth every day. 2 in the morning, 1 at dinner, Disp: , Rfl:     Zinc 30 MG Tab, Take 30 mg by mouth every day., Disp: , Rfl:     QUERCETIN PO, Take 800 mg by mouth 2 times a day. 1 in the morning, 1 at night, Disp: , Rfl:     lysine 500 MG Tab, Take 1 Tablet by mouth every day., Disp: , Rfl:     Cyanocobalamin (B-12) 500 MCG Tab, Take 500 mcg by mouth every day., Disp: , Rfl:     vitamin E 200 UNIT capsule, Take 2 Capsules by mouth every day., Disp: , Rfl:     Copper 5 MG Tab, Take 0.6 Tablets by mouth every day., Disp: , Rfl:     Multiple Vitamins-Minerals (MULTIVITAMIN ADULT PO), Take  by mouth., Disp: , Rfl:     Biotin 2500 MCG Cap, Take  by mouth., Disp: , Rfl:     calcium carbonate (TUMS) 500 MG Chew Tab, Chew 1 Tablet every day., Disp: , Rfl:     Tetrahydrozoline-Zn Sulfate (EYE DROPS AR OP), Administer  into affected eye(s) every day. (Patient not taking: Reported on 2024), Disp: , Rfl:     Omega-3 350 MG CAPSULE DELAYED RELEASE, Take 360 mg by mouth every day. (Patient not taking: Reported on 2024), Disp: , Rfl:       Social History     Tobacco Use    Smoking  status: Former     Current packs/day: 0.00     Average packs/day: 1 pack/day for 28.0 years (28.0 ttl pk-yrs)     Types: Cigarettes     Start date: 1970     Quit date: 1993     Years since quittin.6    Smokeless tobacco: Never    Tobacco comments:     No cigarettes since I quit smoking-cold turkey   Vaping Use    Vaping status: Never Used   Substance and Sexual Activity    Alcohol use: Yes     Alcohol/week: 0.6 oz     Types: 1 Glasses of wine per week     Comment: 1 a week on average-sometimes none a week    Drug use: No    Sexual activity: Never     Partners: Male     Comment: , one daughter,  (now retired).      Social Determinants of Health     Alcohol Use: Unknown (2022)    AUDIT-C     Frequency of Alcohol Consumption: Not on file     Average Number of Drinks: Patient declined     Frequency of Binge Drinking: Never   Depression: Not at risk (2024)    PHQ-2     PHQ-2 Score: 0   Financial Resource Strain: Low Risk  (2022)    Overall Financial Resource Strain (CARDIA)     Difficulty of Paying Living Expenses: Not hard at all   Food Insecurity: No Food Insecurity (2022)    Hunger Vital Sign     Worried About Running Out of Food in the Last Year: Never true     Ran Out of Food in the Last Year: Never true   Housing Stability: Low Risk  (2022)    Housing Stability Vital Sign     Unable to Pay for Housing in the Last Year: No     Number of Places Lived in the Last Year: 1     Unstable Housing in the Last Year: No   Intimate Partner Violence: Not on file   Physical Activity: Inactive (2022)    Exercise Vital Sign     Days of Exercise per Week: 0 days     Minutes of Exercise per Session: 0 min   Social Connections: Socially Integrated (2022)    Social Connection and Isolation Panel [NHANES]     Frequency of Communication with Friends and Family: More than three times a week     Frequency of Social Gatherings with Friends and Family: More than three times a  week     Attends [employer] Services: More than 4 times per year     Active Member of Clubs or Organizations: Yes     Attends Club or Organization Meetings: More than 4 times per year     Marital Status:    Stress: No Stress Concern Present (9/6/2022)    [place] [employer] - Occupational Stress Questionnaire     Feeling of Stress : Only a little   Tobacco Use: Medium Risk (8/20/2024)    Patient History     Smoking Tobacco Use: Former     Smokeless Tobacco Use: Never     Passive Exposure: Not on file   Transportation Needs: No Transportation Needs (9/6/2022)    PRAPARE - Transportation     Lack of Transportation (Medical): No     Lack of Transportation (Non-Medical): No   Utilities: Not on file      Measures Administered: [place] Diagnostic Aphasia Examination - 3rd. Ed. (BDAE-3): Basic Word Discrimination (BWD), Word Repetition (WR), Sentence Repetition (SR); & Responsive Naming (RN); [place] Naming Test (BNT); Brief Visuospatial Memory Test - Revised (BVMT-R); [place] Executive Functioning System (DKEFS): Color-Word Interference (CWI) & Verbal Fluency (VF); Executive Clock Drawing Test (CLOX); Generalized Anxiety Disorder 7 Item Scale (ZANDER-7); [place] Verbal Learning Test - Revised (HVLT-R); Mini-Mental State Examination - 2nd Ed. Orientation (MMSE-2); Neuropsychological Assessment Battery (NAB) Form 1: Dots; Patient Health Questionnaire (PHQ-9); Repeatable Battery for the Assessment of Neuropsychological Status Line Orientation (RBANS LO); Test of Premorbid Functioning (ToPF); Trail Making Test A & B (TMT); Wechsler Adult Intelligence Scale - 4th Ed. (WAIS-IV): Block Design (BD), Digit Span (DS), Matrix Reasoning (MR), Similarities (SI), Vocabulary (VC), & Coding (CD); Wechsler Memory Scale - 4th Ed. Logical Memory (WMS-IV LM); and [place] Card Sorting Test - 64 Card Version (WCST-64).    Behavioral Observations: The patient arrived at the clinic on time and was unaccompanied. She  was well groomed and dressed. She was alert and fully oriented to situation, person, place, and time (MMSE-2 Orientation = 10/10). She was polite and always maintained appropriate interpersonal boundaries. Rapport was easily established. Gait was unremarkable. No gross abnormal movements or motor symptoms were observed. Speech rate, volume, articulation, and prosody were normal. Speech content was logical and appropriate to context. Expressive and receptive language were intact during conversation. She became tearful while discussing interpersonal family conflicts during the interview. Otherwise, her mood was euthymic during the appointment. Affect was mood-congruent and appropriate to the situation. Insight into cognitive and emotional functioning was intact. There was no indication of hallucinations, delusions, or thought disorder. Vision and hearing were adequate for the evaluation. She was attentive throughout the evaluation and had no difficulties with retention of task demands. Response style was unremarkable. Overall, she was engaged and cooperative throughout testing.      Aishwarya Pham, Ph.D.          Clinical Neuropsychologist          Department of Neurology          [facility]           I spent one hour and 10 minutes interviewing the patient (neurobehavioral status exam: 23527 = 1). Four hours and 34 minutes of technician time were spent in test administration and scoring under my supervision (78907 = 1, 82198 = 8).

## 2024-08-29 ENCOUNTER — APPOINTMENT (OUTPATIENT)
Dept: MEDICAL GROUP | Facility: PHYSICIAN GROUP | Age: 69
End: 2024-08-29
Payer: MEDICARE

## 2024-09-06 NOTE — PATIENT INSTRUCTIONS
Thank you for your effort during today's evaluation. We will have a feedback session to discuss your results on 09/16/2024 at 1 PM.

## 2024-09-16 ENCOUNTER — OFFICE VISIT (OUTPATIENT)
Dept: NEUROLOGY | Facility: MEDICAL CENTER | Age: 69
End: 2024-09-16
Attending: CLINICAL NEUROPSYCHOLOGIST
Payer: MEDICARE

## 2024-09-16 VITALS
RESPIRATION RATE: 12 BRPM | HEART RATE: 65 BPM | WEIGHT: 146.16 LBS | HEIGHT: 63 IN | BODY MASS INDEX: 25.9 KG/M2 | OXYGEN SATURATION: 98 % | TEMPERATURE: 98.3 F | DIASTOLIC BLOOD PRESSURE: 62 MMHG | SYSTOLIC BLOOD PRESSURE: 118 MMHG

## 2024-09-16 DIAGNOSIS — G31.84 MILD COGNITIVE IMPAIRMENT: ICD-10-CM

## 2024-09-16 PROCEDURE — 96133 NRPSYC TST EVAL PHYS/QHP EA: CPT | Performed by: CLINICAL NEUROPSYCHOLOGIST

## 2024-09-16 PROCEDURE — 96132 NRPSYC TST EVAL PHYS/QHP 1ST: CPT | Performed by: CLINICAL NEUROPSYCHOLOGIST

## 2024-09-16 PROCEDURE — 3078F DIAST BP <80 MM HG: CPT | Performed by: CLINICAL NEUROPSYCHOLOGIST

## 2024-09-16 PROCEDURE — 3074F SYST BP LT 130 MM HG: CPT | Performed by: CLINICAL NEUROPSYCHOLOGIST

## 2024-09-16 ASSESSMENT — FIBROSIS 4 INDEX: FIB4 SCORE: 2.033316256758893865

## 2024-09-30 ENCOUNTER — OFFICE VISIT (OUTPATIENT)
Dept: SLEEP MEDICINE | Facility: MEDICAL CENTER | Age: 69
End: 2024-09-30
Attending: NURSE PRACTITIONER
Payer: MEDICARE

## 2024-09-30 VITALS
WEIGHT: 143 LBS | OXYGEN SATURATION: 94 % | HEART RATE: 71 BPM | HEIGHT: 63 IN | DIASTOLIC BLOOD PRESSURE: 60 MMHG | BODY MASS INDEX: 25.34 KG/M2 | RESPIRATION RATE: 16 BRPM | SYSTOLIC BLOOD PRESSURE: 120 MMHG

## 2024-09-30 DIAGNOSIS — G47.33 OSA (OBSTRUCTIVE SLEEP APNEA): ICD-10-CM

## 2024-09-30 PROCEDURE — 99213 OFFICE O/P EST LOW 20 MIN: CPT | Performed by: NURSE PRACTITIONER

## 2024-09-30 ASSESSMENT — FIBROSIS 4 INDEX: FIB4 SCORE: 2.033316256758893865
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
## 2024-10-08 ENCOUNTER — APPOINTMENT (OUTPATIENT)
Dept: MEDICAL GROUP | Facility: PHYSICIAN GROUP | Age: 69
End: 2024-10-08
Payer: MEDICARE

## 2024-10-08 VITALS
HEART RATE: 64 BPM | HEIGHT: 63 IN | TEMPERATURE: 97.3 F | OXYGEN SATURATION: 97 % | SYSTOLIC BLOOD PRESSURE: 116 MMHG | BODY MASS INDEX: 24.8 KG/M2 | WEIGHT: 140 LBS | RESPIRATION RATE: 16 BRPM | DIASTOLIC BLOOD PRESSURE: 60 MMHG

## 2024-10-08 DIAGNOSIS — M85.852 OSTEOPENIA OF NECK OF LEFT FEMUR: ICD-10-CM

## 2024-10-08 DIAGNOSIS — G47.33 OSA ON CPAP: ICD-10-CM

## 2024-10-08 DIAGNOSIS — R41.3 MEMORY LOSS: ICD-10-CM

## 2024-10-08 PROBLEM — Z90.710 HISTORY OF HYSTERECTOMY: Status: RESOLVED | Noted: 2021-01-12 | Resolved: 2024-10-08

## 2024-10-08 PROCEDURE — 99214 OFFICE O/P EST MOD 30 MIN: CPT | Performed by: INTERNAL MEDICINE

## 2024-10-08 PROCEDURE — 3074F SYST BP LT 130 MM HG: CPT | Performed by: INTERNAL MEDICINE

## 2024-10-08 PROCEDURE — 3078F DIAST BP <80 MM HG: CPT | Performed by: INTERNAL MEDICINE

## 2024-10-08 ASSESSMENT — FIBROSIS 4 INDEX: FIB4 SCORE: 2.033316256758893865

## 2024-10-29 ENCOUNTER — PATIENT MESSAGE (OUTPATIENT)
Dept: SLEEP MEDICINE | Facility: MEDICAL CENTER | Age: 69
End: 2024-10-29
Payer: MEDICARE

## 2024-10-31 ENCOUNTER — APPOINTMENT (RX ONLY)
Dept: URBAN - METROPOLITAN AREA CLINIC 6 | Facility: CLINIC | Age: 69
Setting detail: DERMATOLOGY
End: 2024-10-31

## 2024-10-31 DIAGNOSIS — L82.1 OTHER SEBORRHEIC KERATOSIS: ICD-10-CM

## 2024-10-31 DIAGNOSIS — D22 MELANOCYTIC NEVI: ICD-10-CM

## 2024-10-31 DIAGNOSIS — Z87.2 PERSONAL HISTORY OF DISEASES OF THE SKIN AND SUBCUTANEOUS TISSUE: ICD-10-CM

## 2024-10-31 DIAGNOSIS — D18.0 HEMANGIOMA: ICD-10-CM

## 2024-10-31 DIAGNOSIS — B35.1 TINEA UNGUIUM: ICD-10-CM

## 2024-10-31 DIAGNOSIS — Z71.89 OTHER SPECIFIED COUNSELING: ICD-10-CM

## 2024-10-31 DIAGNOSIS — L81.4 OTHER MELANIN HYPERPIGMENTATION: ICD-10-CM

## 2024-10-31 PROBLEM — D22.71 MELANOCYTIC NEVI OF RIGHT LOWER LIMB, INCLUDING HIP: Status: ACTIVE | Noted: 2024-10-31

## 2024-10-31 PROBLEM — D23.5 OTHER BENIGN NEOPLASM OF SKIN OF TRUNK: Status: ACTIVE | Noted: 2024-10-31

## 2024-10-31 PROBLEM — D18.01 HEMANGIOMA OF SKIN AND SUBCUTANEOUS TISSUE: Status: ACTIVE | Noted: 2024-10-31

## 2024-10-31 PROBLEM — D22.5 MELANOCYTIC NEVI OF TRUNK: Status: ACTIVE | Noted: 2024-10-31

## 2024-10-31 PROBLEM — D23.39 OTHER BENIGN NEOPLASM OF SKIN OF OTHER PARTS OF FACE: Status: ACTIVE | Noted: 2024-10-31

## 2024-10-31 PROCEDURE — ? SUNSCREEN TREATMENT REGIMEN

## 2024-10-31 PROCEDURE — 99213 OFFICE O/P EST LOW 20 MIN: CPT

## 2024-10-31 PROCEDURE — ? DEFER

## 2024-10-31 PROCEDURE — ? COUNSELING

## 2024-10-31 PROCEDURE — ? PRESCRIPTION

## 2024-10-31 RX ORDER — CICLOPIROX 80 MG/ML
SOLUTION TOPICAL QD
Qty: 6.6 | Refills: 12 | Status: ERX | COMMUNITY
Start: 2024-10-31

## 2024-10-31 RX ADMIN — CICLOPIROX: 80 SOLUTION TOPICAL at 00:00

## 2024-10-31 ASSESSMENT — LOCATION DETAILED DESCRIPTION DERM
LOCATION DETAILED: LEFT ULNAR DORSAL HAND
LOCATION DETAILED: SUPERIOR THORACIC SPINE
LOCATION DETAILED: LEFT INFERIOR FOREHEAD
LOCATION DETAILED: LEFT RADIAL DORSAL HAND
LOCATION DETAILED: EPIGASTRIC SKIN
LOCATION DETAILED: RIGHT GREAT TOENAIL
LOCATION DETAILED: LEFT GREAT TOENAIL
LOCATION DETAILED: RIGHT RADIAL DORSAL HAND
LOCATION DETAILED: LEFT POSTERIOR LATERAL DISTAL THIGH
LOCATION DETAILED: RIGHT MEDIAL PLANTAR MIDFOOT
LOCATION DETAILED: PERIUMBILICAL SKIN
LOCATION DETAILED: UPPER STERNUM

## 2024-10-31 ASSESSMENT — LOCATION SIMPLE DESCRIPTION DERM
LOCATION SIMPLE: LEFT FOREHEAD
LOCATION SIMPLE: RIGHT HAND
LOCATION SIMPLE: CHEST
LOCATION SIMPLE: LEFT GREAT TOE
LOCATION SIMPLE: LEFT THIGH
LOCATION SIMPLE: RIGHT PLANTAR SURFACE
LOCATION SIMPLE: UPPER BACK
LOCATION SIMPLE: ABDOMEN
LOCATION SIMPLE: LEFT HAND
LOCATION SIMPLE: RIGHT GREAT TOE

## 2024-10-31 ASSESSMENT — LOCATION ZONE DERM
LOCATION ZONE: HAND
LOCATION ZONE: TOENAIL
LOCATION ZONE: LEG
LOCATION ZONE: FEET
LOCATION ZONE: FACE
LOCATION ZONE: TRUNK

## 2024-12-15 NOTE — TELEPHONE ENCOUNTER
Pt called triage line stating she had talked to Dr. Rosenberg on her last visit about doing a procedure of putting a camera in her belly button, notified pt that that procedure is called Laparoscopy and she will need to schedule an appt with Doctor to discuss it per consult with Dr. Librado Rosenberg. Pt verbalized understanding and transferred to Dori VENEGAS to schedule appt.   
no

## 2024-12-18 DIAGNOSIS — B35.1 NAIL FUNGUS: ICD-10-CM

## 2024-12-21 PROBLEM — R41.3 MEMORY LOSS: Status: RESOLVED | Noted: 2020-09-15 | Resolved: 2024-12-21

## 2024-12-21 PROBLEM — G31.84 MILD COGNITIVE IMPAIRMENT: Status: ACTIVE | Noted: 2024-12-21

## 2024-12-21 NOTE — PROGRESS NOTES
"CONFIDENTIAL NEUROPSYCHOLOGICAL EVALUATION REPORT    Patient name: Karuna Reid  Referral source: Jorge Badillo M.D.    MRN: [id number]  Evaluation date: 8/20/2024   YOB: 1955  Neuropsychologist: Aishwarya Pham, Ph.D.         This evaluation was conducted for clinical treatment planning and may not be valid for other purposes. Potential risks and benefits, limits of confidentiality, and test procedures were discussed. Following this discussion, the patient consented to complete the evaluation. Information for this report was obtained from the medical record, neuropsychological testing, and a clinical interview with the patient conducted on 08/20/2024. Feedback, including review of results and recommendations, was conducted on 09/16/2024.    Background and Referral Information: The patient is a 68-year-old, , right-handed,  White, female, retired , with 12 years of education, who has experienced cognitive decline. Dr. Badillo referred the patient for a neuropsychological evaluation to characterize her cognitive concerns, aid in differential diagnosis, and treatment planning.    Previous Studies: Neurological exam (10/19/2023) was remarkable for: \"Her mental processing speed is a bit slowed, there is no apraxia, or inattention. MoCA is now 20/30 (from 3 years ago it was 25/30). Her mood is euthymic, affect is mood appropriate. The difficulties now seem to be more language-based, the visuospatial and executive problems she had before actually seem to be better. Still, there is also some of the memory encoding issues.\" \"Reflexes are brisk and present throughout, there are no asymmetries and none are dropped.\" Cognitive screener (10/19/2023) was below expectation ([place] Cognitive Assessment; MOCA = 20/30). MRI of the brain (11/02/2020) documented: \"MRI of the brain without contrast within normal limits.\" PET-CT of the brain (11/03/2020) revealed: \"No areas " "of significantly decreased activity in the cerebral cortex. Essentially normal PET/CT of the brain.\" Polysomnography (12/04/2022) documented: \"1. Moderate obstructive sleep apnea with overall AHI of 15.3 2. Mild nocturnal hypoxia likely secondary to untreated sleep apnea minimum oxygen saturation 82% during diagnostic portion, time at or below 88% saturation 16.6 minutes 3. Respiratory events improved with CPAP therapy 4. Supine REM sleep was seen while on CPAP pressure of 6.\"    Presenting Concerns:     Cognitive Functioning: The patient reported onset of mild cognitive problems following the passing away of her father approximately 15 years ago. She noted her cognitive difficulties have slowly worsened over time, with daily fluctuations. Examples of her current concerns included misplacing items, and forgetting planned activities (without a calendar), recent tasks she has done, and names (of people and objects). She also noted increased difficulty retaining what she reads. Reminders are typically beneficial. She reported she is processing information slower, has concentration problems (e.g., has to reread information or loses track of conversations), and increased difficulty multitasking. She also noted she has become more indecisive, and it takes her longer to plan and organize. Additionally, she reported word finding problems resulting in circumlocution that have mildly improved since using CPAP for obstructive sleep apnea (FREDDY) consistently for the past year. She denied difficulties with visual perception or navigation.    Functional Abilities: The patient reported she is independent and fully capable in all self-care and instrumental activities of daily living (ADLs) including medication management, finances, shopping, scheduling appointments, cooking, household responsibilities, and driving (no recent tickets or accidents). She noted she has had episodes of falling asleep while driving in the past, but this " "has not occurred recently, and CPAP use seems to be helping in this regard.     Emotional Functioning: The patient reported occasional sadness or feeling down, without persistent symptoms. She noted increased anxiety due to situational factors. Current stressors include the \"world news\" and interpersonal problems she has been having with family members, including her , due to having opposite political views. She also reported increased irritability with episodes of frustration associated with this. She noted she has been going to bed late at night and sleeping an average of 4 to 5 hours for the past year, resulting in daytime somnolence. She denied loss of interest in activities, anhedonia, suicidal ideation, appetite changes, and traumatic stress related symptoms. There were no reports of manic symptoms, social withdrawal, personality changes, acting out dreams, hallucinations, or delusions.    Sensory/Physical/Motor Changes: The patient reported that her sense of smell and taste are reduced since she had COVID-19. She noted mild hearing difficulties while watching TV. However, she stated she underwent an audiology evaluation and was told she does not require hearing aids. She denied recent declines in her vision. She reported chronic neck pain (average intensity: 6/10) resulting in occasional headaches coupled with right hand pain due to arthritis. She manages pain with Tylenol as needed. She denied balance problems, incontinence, falls within the past 6 months, tremors, and fine or gross motor problems.    Medical History: Per her medical record, it includes dyslipidemia, FREDDY (uses CPAP nightly), daytime sleepiness, vitamin D deficiency (resolved and supplementing), arthritis, osteopenia of neck of left femur, osteoporosis, cervical degenerative disc disease, right middle finger trigger finger, asthma, constipation, chickenpox, dental disorder (implant), gastroesophageal reflux disease, menopausal and " postmenopausal disorder, unspecified peripheral vascular disease, swelling of lower extremity, tonsillitis, and urinary bladder disorder. The patient reported she has had COVID-19 multiple times. The last time she had COVID-19 was in January, noting that she recovered without significant complications or long-lasting symptoms. There is no reported history of known seizures, strokes, or traumatic brain injuries. Surgical history includes total hysterectomy, right middle finger trigger release, abdomen diagnostic laparoscopy, excision of tumor in left foot, cystoscopy, tonsillectomy, and throat exploration. Hospitalizations within the past month were denied.    Mental Health History: The patient denied a history of mental health treatment or diagnosis, including treatment with a psychiatrist, counseling, and psychiatric hospitalizations.     Family Medical History: The patient reported her mother had Alzheimer's disease (AD) and passed away when she was 81 years old. She noted her mother was diagnosed with AD approximately 5 years before passing away. The patient indicated her mother had 9 sisters and half of them were diagnosed with AD. Additional family history is relevant for cancer, stroke, and heart problems.     Medications and Supplements: Krill oil, vitamin D3, vitamin C, zinc, quercetin, lysine, vitamin B12, vitamin E, copper, multivitamin-minerals, biotin, and calcium carbonate.     Psychosocial History: The patient was born in [place], [place]. She stated her mother was from [place] and her father was from [place] descent. She denied a known history of birth complications or early developmental delays. She noted that she relocated multiple times while growing up, but she earned a high school diploma in [place], [place]. She reported she took a few scattered classes at [employer] and obtained a certificate at a business college as well. She stated she had mild attention problems in  school but was never diagnosed with a neurodevelopmental disorder. She denied a history of learning problems, academic difficulties, and special education during her school years. Her main line of work was as an  for [employer] in the area of family support. 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 She retired in 2020. She currently lives with her  of 44 years in a private residence. She has one daughter and two grandchildren. She reported good social support from friends. Hobbies and activities include volunteering for her political party, gardening, traveling, and camping.     Substance Use: The patient reported she drinks an average of 2 glasses of wine per day. She noted a 28-pack-year history of cigarette smoking and has been abstinent since her early 40s. She denied current use of illicit drugs, marijuana, and nicotine products. There is no reported history of substance use disorder or treatment.     Measures Administered: [place] Diagnostic Aphasia Examination - 3rd. Ed. (BDAE-3): Basic Word Discrimination (BWD), Word Repetition (WR), Sentence Repetition (SR); & Responsive Naming (RN); [place] Naming Test (BNT); Brief Visuospatial Memory Test - Revised (BVMT-R); Fabienne-Sauceda Executive Functioning System (DKEFS): Color-Word Interference (CWI) & Verbal Fluency (VF); Executive Clock Drawing Test (CLOX); Generalized Anxiety Disorder 7 Item Scale (ZANDER-7); [place] Verbal Learning Test - Revised (HVLT-R); Mini-Mental State Examination - 2nd Ed. Orientation (MMSE-2); Neuropsychological Assessment Battery (NAB) Form 1: Dots; Patient Health Questionnaire (PHQ-9); Repeatable Battery for the Assessment of Neuropsychological Status Line Orientation (RBANS LO); Test of Premorbid Functioning (ToPF); Trail Making Test A & B (TMT); Wechsler Adult Intelligence Scale - 4th Ed. (WAIS-IV): Block Design (BD), Digit Span (DS), Matrix Reasoning (MR),  Similarities (SI), Vocabulary (VC), & Coding (CD); Wechsler Memory Scale - 4th Ed. Logical Memory (WMS-IV LM); and [place] Card Sorting Test - 64 Card Version (WCST-64).     Behavioral Observations: The patient arrived at the clinic on time and was unaccompanied. She was well groomed and dressed. She was alert and fully oriented to situation, person, place, and time (MMSE-2 Orientation = 10/10). She was polite and always maintained appropriate interpersonal boundaries. Rapport was easily established. Gait was unremarkable. No gross abnormal movements or motor symptoms were observed. Speech rate, volume, articulation, and prosody were normal. Speech content was logical and appropriate to context. Expressive and receptive language were intact during conversation. She became tearful while discussing interpersonal family conflicts during the interview. Otherwise, her mood was euthymic during the appointment. Affect was mood-congruent and appropriate to the situation. Insight into cognitive and emotional functioning was intact. There was no indication of hallucinations, delusions, or thought disorder. Vision and hearing were adequate for the evaluation. She was attentive throughout the evaluation and had no difficulties with retention of task demands. Response style was unremarkable. Overall, she was engaged and cooperative throughout testing.    Results & Key Findings: Please note that scores reported are for professional use only. For diagnostic purposes, a performance score that falls below the 9th percentile of the reference group for that measure may be considered a cognitive deficit depending on the overall pattern of performance. The following clinical descriptors identify performance within the range of percentile scores indicated in the parentheses: Exceptionally High Score (>98th), Above Average Score (91st-97th), High Average Score (75th-90th), Average Score (25th-74th), Low Average Score (9th-24th), Below  Average Score (3rd-8th), and Exceptionally Low Score (<2nd). Please see the attached test results summary table in the appendix for a list of measures administered as well as raw and normative scores, which are listed in the designated columns. All such scores are based on age-corrected norms and certain scores may be adjusted for education and/or other demographic factors as appropriate.     Data Validity: The patient's performance on embedded and standalone validity measures was within the valid range, suggesting she appropriately engaged in testing. The following test results are considered an accurate representation of her current level of cognitive functioning.    Premorbid and Intellectual Functioning: A predicted estimate of premorbid intellectual functioning based on age and her performance on a single word-reading test fell within the average range (TOPF). Based on demographic factors, her premorbid ability was predicted in the average range (TOPF). Commensurate with this, current general intellectual ability was estimated in the average range compared to similarly aged peers (WAIS-IV; prorated General Ability Index; GAI). Among underlying abilities, performance on indices of verbal comprehension/reasoning (prorated VCI) and visuospatial/perceptual reasoning (prorated ANUM) was average compared to similarly aged peers. There was no clinically meaningful discrepancy between these index scores.    Cognitive Functioning:     The patient's performance on some measures in the following domains was below expectation:    Memory: Total recall of a wordlist across three repeated learning trials was low average. Delayed recall remained low average, with 75% retention of information. Recognition discrimination of the wordlist was average, with 11/12 target words correctly identified and nil false positive errors (HVLT-R). Immediate recall of short stories was average. Delayed recall remained average (94% retention).  Delayed recognition of story details was exceptionally low (WMS LM). Total recall of an array of simple geometric figures across three repeated learning trials was below average. Delayed recall was low average (100% retention). Delayed recognition discrimination of the figures was within normal limits, with 5/6 target figures correctly identified and nil false positive errors (BVMT-R).  Executive Functioning: Phonemic verbal fluency was below average (DKEFS VF). Novel problem solving in response to feedback on a card sorting task was low average. However, the high number of perseverative errors on this task was in the below average range (WCST-64). Visuomotor set shifting was low average (TMT B). Semantic verbal set shifting (DKEFS VF), response inhibition with a set shifting component (DKEFS CWI), abstract verbal reasoning (WAIS-IV SI), freehand clock drawing/conceptualization (CLOX 1), and abstract/deductive visual reasoning (WAIS-IV MR) were all average. Response inhibition was high average (DKEFS CWI).    The patient's performance in the following domains was within to above expectation:     Attention/Working Memory: Basic auditory attention span (forward number repetition) and working memory (backward/sequenced number repetition; WAIS-IV DS). Visual working memory (NAB Dots).  Processing Speed: Rapid color naming (DKEFS CWI), simple word reading speed (DKEFS CWI), visuomotor number sequencing speed (TMT A), and rapid code transcription (WAIS-IV CD).   Language: Visual confrontation naming (BNT), single word reading (TOPF), semantic verbal fluency (DKEFS VF), abstract verbal reasoning (WAIS-IV SI), vocabulary knowledge (WAIS-IV IN), basic word discrimination (BDAE-3 BWD), word repetition (BDAE-3 WR), sentence repetition (BDAE-3 SR), and responsive naming (BDAE-3 RN).   Visual Perception/Construction: Judgment of line orientation (RBANS LO), simple figure copy (BVMT-R Copy), clock copy (CLOX 2),  abstract/deductive visual reasoning (WAIS-IV MR), and construction of block designs (WAIS-IV BD).     Self-Report Questionnaires: The patient's responses on self-report inventories of emotional functioning were indicative of minimal levels of depression (PHQ-9) and anxiety (ZANDER-7) over the past two weeks.    Neuropsychological Findings and Impressions    Results Summary/Interpretation: The patient's general intellectual ability was estimated in the average range, consistent with her predicted premorbid ability. Deficits were detected in aspects of executive functioning (phonemic verbal fluency and inefficient novel problem solving with numerous perseverative errors). Other aspects of executive functioning were intact. Memory was variable. Encoding, delayed recall, and recognition of rote verbal information (wordlist) was within normal limits. Contextual verbal memory (stories) was notable for intact encoding and delayed recall but deficient recognition. This pattern of memory performance is unusual and may reflect fluctuations in attention, level of test engagement, or executive dysfunction interfering with performance. Visual memory (simple figures) was characterized by deficient encoding, but intact delayed recall and recognition. This pattern of visual memory performance is suggestive of inattention and executive dysfunction interfering with encoding of visual information. Her performance on all the remaining measures across cognitive domains was within normal limits including attention/working memory, processing speed, language, and visual perception/construction. She reported minimal symptoms of depression and anxiety on self-report measures. During the clinical interview, she reported increased anxiety and stress associated with interpersonal conflicts with family members.     Impression: The patient's cognitive profile revealed impairments in aspects of executive functioning and variable memory performance.  Otherwise, her cognition was well preserved. Based on her history, cognitive profile, and reported functional status, she meets criteria for mild cognitive impairment (mild neurocognitive disorder). Etiologically, her executive function deficits are suggestive of frontal-subcortical networks dysfunction, most consistent with what is typically seen in patients with vascular cognitive impairment. This is somewhat corroborated by her medical history of vascular risk factors (e.g., dyslipidemia and FREDDY), though her brain MRI did not show significant cerebrovascular disease. Other possible contributing factors to her cognitive difficulties include sleep deprivation, daytime fatigue, daily alcohol use, chronic pain, and anxiety associated with current stressors. Her overall pattern of performance including intact rote verbal memory, visual recognition memory, and language is not consistent with Alzheimer's disease or primary progressive aphasia. This evaluation may serve as a baseline for longitudinal tracking.    Recommendations:    Based on the present results, the patient is recommended for a repeat neuropsychological evaluation in the next 12 to 18 months or sooner if there is a considerable change in cognitive or emotional status. At that time, diagnostic impressions and treatment recommendations will be revised and updated as necessary. Additional testing will permit comparisons over time to better identify stability, potential improvement, or possible decline.  In light of her cognitive deficits, increased oversight and assistance with complex instrumental activities of daily living (e.g., financial and medication/health management) from a family member or other trusted individual are recommended.   Given her cognitive deficits, increased oversight and assistance from family members or trusted others with complex decision-making (e.g., legal, financial, medical) are recommended.  Given her cognitive deficits, the  patient may benefit from engagement in speech or occupational therapy with a cognitive rehabilitation component to learn appropriate compensation strategies. She may reach out to me to place a referral as needed.  Given her reported symptoms of anxiety, if interested, the patient may benefit from the initiation of individual counseling sessions to further assess and treat these symptoms and for additional support coping with current stressors. Evidence-based treatments such as cognitive behavioral therapy, acceptance and commitment therapy, and mindfulness-based interventions may be particularly beneficial: [facility] (https://www.[facility].org/health-services/behavioral-health; 654.591.9412 or [phone]). He may reach out to me to place a referral as needed. The following are additional options for psychotherapy in the community:  [facility] (https://www.[facility]/psychology/services-and-community-outreach/vhcqqeytjqvic-kqlkflfm-qdpmmf; 255.898.6753)  Sulema Llamas, PhD (https://www.[facility]/; 281.246.5585)  [facility] (https://www.[facility]eBuddy; 332.257.9430)  [facility] (https://[email or web address].com/index.html; 301.919.4468)  [facility] (https://[facility]umper.[email or web address]; 544.688.7010)   A directory of providers can also be found on the Psychology Today website (www.psychologytoday.com)  In light of her cognitive deficits, it is recommended that the patient be closely monitored by her family or other trusted individuals for future changes or declines while driving to ensure her safety and that of others on the road. If this becomes an area of concern, it would be advised that she undergo a formal driving evaluation conducted by an occupational therapist with expertise in this area: [facility] ([email or web address].Tyros/Health-Services/Physical-Therapy; 795.409.3247). Examples to promote  safe driving include restricting driving to well-known routes during the day in fair weather with good visibility, having a passenger in the car, limiting external distractions (e.g., radio, cell phone), and avoiding complicated driving situations and highly congested areas.  Daily alcohol use has been associated with increased risk for vascular disease and cognitive decline. As such, she is encouraged to reduce her current level of alcohol consumption (e.g., no more than 4 drinks weekly).  The patient reported sleeping an average of 4 to 5 hours per night resulting in daytime somnolence that may be contributing to her cognitive difficulties. This is within the context of well controlled FREDDY. As such, she may benefit from education regarding sleep hygiene and healthy sleep habits, including maintenance of a regular sleep/wake cycle and integrating relaxation exercises before bed. This was provided during the feedback session. She is also encouraged to continue using CPAP nightly and follow up with sleep medicine as needed regarding ongoing FREDDY treatment. Additional strategies include:  The light emitted by phone screens, TVs, and iPads can mimic daylight and suppress the body's natural release of melatonin, making it harder to fall asleep. It is recommended to stop screen time about an hour before bed.  Develop a bedtime routine.  Keep the bedroom at a comfortable temperature.  Use low lighting in the evenings.  Avoid consuming large meals and caffeine close to bedtime.  Avoid sleeping during the day, as it can disturb the normal pattern of sleep and wakefulness.  Exercise can promote good sleep, particularly when completed in the morning or early afternoon.  Continued use of compensatory strategies is recommended to reduce cognitive demands. This may include setting scheduled routines, having an established location for essential items (e.g., wallet, glasses, and keys), completing tasks when there are no time  demands, completing one task at a time, minimizing external distractions, paraphrasing and repeating to be learned information, and using external aids for reminders (e.g., planner, calendar, setting alarms, labels, to-do lists) consistently.  Energy conservation strategies are recommended to manage mental and physical fatigue, such as taking scheduled breaks, breaking down demanding tasks into smaller components, working on projects for shorter periods, maximizing time when she feels the most alert, and working on cognitively demanding projects at her best time of the day.  In light of her medical history, cerebrovascular disease represents a risk factor for future cognitive decline. As such, the patient is encouraged to reduce vascular risk factors per her providers' treatment recommendations (e.g., healthy diet, exercise, and medication adherence). The book Undo It!: How Simple Lifestyle Changes Can Reverse Most Chronic Diseases by Cedrick Nieto and Aura Neito may be a helpful resource for the patient and his family. The patient and her family may also benefit from resources available through the American Heart Association (https://www.heart.org/ or 1-215.252.6413), which offers psychoeducational information and services for individuals with vascular risk factors.   Individuals with mild cognitive impairment are at a higher risk of developing dementia in the future. As such, the patient is encouraged to regularly engage in social and physical recreation, as well as cognitively stimulating activities (e.g., puzzles, games, reading, exercise, and social gatherings) to promote brain health and emotional well-being. The book Living with Mild Cognitive Impairment: A Guide to Maximizing Brain Health and Reducing Risk of Dementia by Zoila Moralez may be a helpful resource for the patient and her family. The [facility] [employer] [place] provides diverse educational & social opportunities for adult learners  (https://www.olli.[facility]; 932.186.2534).  The books Keep Your Wits About You: The Science of Brain Maintenance as You Age by Fely Urbina PhD, Your Memory: How It Works and How to Improve It by Dick Cerna, and [employer] Guide to Achieving Optimal Memory by Ryan Pendleton and Vita Ibrahim may also be helpful resources for the patient and her family.  If not already done so, the patient and her family members are encouraged to discuss legal and financial affairs, such as establishing a will and power of  to assist her with future financial and healthcare decisions. Information regarding these issues can be found at www.caringinfo.org or www.agingwithdignity.org/5wishes.html.      Thank you for allowing me to participate in the patient's care. If I can be of further assistance, please do not hesitate to contact me.      Aishwarya Pham, Ph.D.          Clinical Neuropsychologist          Department of Neurology          [facility]             Appendix:    Test Results Summary Table:            This report was created using voice recognition software. I have made every reasonable attempt to avoid dictation errors, but this document may contain an error not identified before finalizing. If the error changes the accuracy of the document, I would appreciate it being brought to my attention. Thank you.    Four hours and 21 minutes were spent in clinical decision-making, chart review, records reviews, interpretation of test results and clinical data, integration of patient data, interactive feedback to the patient, and report preparation (test evaluation services: 15963 = 1, 91737 = 3).

## 2024-12-26 ENCOUNTER — OFFICE VISIT (OUTPATIENT)
Dept: NEUROLOGY | Facility: MEDICAL CENTER | Age: 69
End: 2024-12-26
Attending: PSYCHIATRY & NEUROLOGY
Payer: MEDICARE

## 2024-12-26 VITALS
RESPIRATION RATE: 16 BRPM | SYSTOLIC BLOOD PRESSURE: 116 MMHG | BODY MASS INDEX: 25.27 KG/M2 | OXYGEN SATURATION: 96 % | DIASTOLIC BLOOD PRESSURE: 68 MMHG | HEART RATE: 91 BPM | HEIGHT: 63 IN | WEIGHT: 142.64 LBS

## 2024-12-26 DIAGNOSIS — G31.84 MILD COGNITIVE IMPAIRMENT: ICD-10-CM

## 2024-12-26 DIAGNOSIS — R41.3 MEMORY LOSS OF UNKNOWN CAUSE: ICD-10-CM

## 2024-12-26 PROCEDURE — 99212 OFFICE O/P EST SF 10 MIN: CPT | Performed by: PSYCHIATRY & NEUROLOGY

## 2024-12-26 PROCEDURE — 3078F DIAST BP <80 MM HG: CPT | Performed by: PSYCHIATRY & NEUROLOGY

## 2024-12-26 PROCEDURE — 3074F SYST BP LT 130 MM HG: CPT | Performed by: PSYCHIATRY & NEUROLOGY

## 2024-12-26 PROCEDURE — 99215 OFFICE O/P EST HI 40 MIN: CPT | Performed by: PSYCHIATRY & NEUROLOGY

## 2024-12-26 ASSESSMENT — ENCOUNTER SYMPTOMS
MEMORY LOSS: 1
FOCAL WEAKNESS: 0
DIZZINESS: 0
INSOMNIA: 0

## 2024-12-26 ASSESSMENT — FIBROSIS 4 INDEX: FIB4 SCORE: 2.06

## 2024-12-26 NOTE — PROGRESS NOTES
Robbie Reid is a 69 y.o. female who presents alone, for follow-up, now status post neuropsychological evaluation, with a history of cognitive impairment.     OSCAR Beckman states that she is still having difficulties with some of her daily routines.  She is more likely to get distracted, as a result forgetting things is still problematic for her.  It is not at all disabling, but there are times that she is concerned.  More complex task and conversations can confuse her more easily, but she has begun to adapt appropriately.  She still drives safely.  She remains active socially, intellectually and physically.    She is sleeping well.  Appetite and weight are stable.  She is concerned about dietary changes that were made's recommendations following her neuropsychological evaluations, specifically to no longer eat meat, dairy products, etc.  She sleeps well, continues to use her CPAP for her FREDDY.    Neuropsychological evaluation from September of this year revealed findings consistent with MCI, nothing that yet suggests even mild dementia.  The issues she had most difficulty with involved subfrontal networking, with executive and variable memory performance.  Most commonly seen in patients with cerebrovascular disease, there were other potential confounding issues including sleep deprivation, daytime fatigue, pain, anxiety, etc.  Her only MRI scan done in 2020 was completely normal.  Mention was made of this disconnect, explanations could not be provided.  Still, it was not felt that she had signs of other cognitive neurodegenerative diseases such as FTD, primary progressive aphasia, etc.  Recommendations were simply to follow along and repeat evaluation in 12-18 months.    For treatment it was recommended she follow through with continuing in regular physical activities, socializing, intellectual activities, etc.  She will continue her CPAP, obviously.  Unfortunately adequate cognitive therapies  "are not available at this time though she was given contact information.  Routine and structure were emphasized, help with instrumental activities of daily living (i.e. financial decisions) and more complex decision making activities also were mentioned.    Medical, surgical and family histories are reviewed, there are no new drug allergies.  From my standpoint she is on no medications.  She remains on supplements which would include vitamin C, vitamin D with calcium, biotin, vitamin B12, Krill oil, lysine, multivitamin, vitamin D and zinc.    Review of Systems   Neurological:  Negative for dizziness and focal weakness.   Psychiatric/Behavioral:  Positive for memory loss. The patient does not have insomnia.    All other systems reviewed and are negative.    Objective     /68 (BP Location: Left arm, Patient Position: Sitting, BP Cuff Size: Adult)   Pulse 91   Resp 16   Ht 1.6 m (5' 3\")   Wt 64.7 kg (142 lb 10.2 oz)   SpO2 96%   BMI 25.27 kg/m²      Physical Exam    She appears in no acute distress.  She is clean and appropriately dressed, quite cooperative.  Vital signs are stable.  There is no malar rash.  The neck is supple.  Cardiac evaluation is unremarkable.     Neurological Exam    Fully oriented, there is no aphasia, apraxia, or inattention.  PERRLA/EOMI, visual fields are grossly full, there is no bulbar dysfunction.  Facial movements are symmetric.  She moves all 4 extremities without issue, there is no appendicular incoordination, gait is normal and station and stride length.  Fine motor control is intact throughout.    Assessment & Plan   Assessment & Plan  Mild cognitive impairment  For now we simply observe.  She does not yet fit criteria for mild dementia, she was reassured that not all patients with MCI actually progressed to dementia over time.  She is already observing the appropriate types of behavioral changes and activity engagement to help maintain her present level of functioning for " longer.  No specific medications are indicated at this time.  There is no data supporting specific treatments in patients with a diagnosis of MCI not related to other medical or neurologic conditions.  She was told she does not need to restrict activities at this time.  Still, recommendations were made for dietary changes if appropriate, emphasis on Mediterranean dieting and style, continuing regular physical activities, etc.  Memory loss of unknown cause  I would recommend repeating imaging of the brain since that done from over 4 years ago was completely normal.  Some of the abnormalities on neuropsychological testing suggested subcortical white matter changes, thus I would like to make sure there is no evidence of more advanced renovascular disease in these areas of the brain.  She lacks a history of typical risk for vascular disease.  This may tie into why it was recommended she avoid eating red meat and dairy products.  I think eating these food types in moderation is probably safe.  Data has shown, from [place], that 1 glass of red wine every evening can actually help cardiac outcomes and reduce risk of cerebrovascular disease.  We simply observe, regardless.  We can see each other in 1 year.  We will contact her with the MRI results if they are significant.    Orders:    MR-BRAIN-W/O; Future    Time: 40 minutes in total spent in patient care including pre-charting, record review, discussion with healthcare staff and documentation.  This includes face-to-face time for exam, review, discussion, as well as counseling and coordinating care.

## 2024-12-26 NOTE — ASSESSMENT & PLAN NOTE
I would recommend repeating imaging of the brain since that done from over 4 years ago was completely normal.  Some of the abnormalities on neuropsychological testing suggested subcortical white matter changes, thus I would like to make sure there is no evidence of more advanced renovascular disease in these areas of the brain.  She lacks a history of typical risk for vascular disease.  This may tie into why it was recommended she avoid eating red meat and dairy products.  I think eating these food types in moderation is probably safe.  Data has shown, from [place], that 1 glass of red wine every evening can actually help cardiac outcomes and reduce risk of cerebrovascular disease.  We simply observe, regardless.  We can see each other in 1 year.  We will contact her with the MRI results if they are significant.    Orders:    MR-BRAIN-W/O; Future

## 2024-12-26 NOTE — ASSESSMENT & PLAN NOTE
For now we simply observe.  She does not yet fit criteria for mild dementia, she was reassured that not all patients with MCI actually progressed to dementia over time.  She is already observing the appropriate types of behavioral changes and activity engagement to help maintain her present level of functioning for longer.  No specific medications are indicated at this time.  There is no data supporting specific treatments in patients with a diagnosis of MCI not related to other medical or neurologic conditions.  She was told she does not need to restrict activities at this time.  Still, recommendations were made for dietary changes if appropriate, emphasis on Mediterranean dieting and style, continuing regular physical activities, etc.

## 2024-12-27 ENCOUNTER — TELEPHONE (OUTPATIENT)
Dept: HEALTH INFORMATION MANAGEMENT | Facility: OTHER | Age: 69
End: 2024-12-27
Payer: MEDICARE

## 2024-12-27 DIAGNOSIS — G47.10 HYPERSOMNOLENCE: ICD-10-CM

## 2024-12-27 DIAGNOSIS — G47.33 OSA (OBSTRUCTIVE SLEEP APNEA): ICD-10-CM

## 2024-12-27 NOTE — TELEPHONE ENCOUNTER
1. Caller Name: Karuna A Mardon                          Call Back Number: [phone] (home)         How would the patient prefer to be contacted with a response: Phone call OK to leave a detailed message    2. SPECIFIC Action To Be Taken:  Pt would like you to order the MSLT test. Pt stated she would like to move forward with getting this test completed. She stated [facility] you [facility] not completing previously. Pt stated she would like someone to call her and explain how this test works and when it can be done.      3. Diagnosis/Clinical Reason for Request: FREDDY    4. Specialty & Provider Name/Lab/Imaging Location: N/A    5. Is appointment scheduled for requested order/referral: no    Patient was informed they will receive a return phone call from the office ONLY if there are any questions before processing their request. Advised to call back if they haven't received a call from the referral department in 5 days.

## 2025-01-08 ENCOUNTER — TELEPHONE (OUTPATIENT)
Dept: HEALTH INFORMATION MANAGEMENT | Facility: OTHER | Age: 70
End: 2025-01-08
Payer: MEDICARE

## 2025-01-09 ENCOUNTER — HOSPITAL ENCOUNTER (OUTPATIENT)
Dept: RADIOLOGY | Facility: MEDICAL CENTER | Age: 70
End: 2025-01-09
Attending: PSYCHIATRY & NEUROLOGY
Payer: MEDICARE

## 2025-01-09 DIAGNOSIS — R41.3 MEMORY LOSS OF UNKNOWN CAUSE: ICD-10-CM

## 2025-01-09 PROCEDURE — 70551 MRI BRAIN STEM W/O DYE: CPT

## 2025-01-09 ASSESSMENT — PATIENT HEALTH QUESTIONNAIRE - PHQ9
2. FEELING DOWN, DEPRESSED, IRRITABLE, OR HOPELESS: SEVERAL DAYS
1. LITTLE INTEREST OR PLEASURE IN DOING THINGS: SEVERAL DAYS

## 2025-01-09 ASSESSMENT — ACTIVITIES OF DAILY LIVING (ADL): BATHING_REQUIRES_ASSISTANCE: 0

## 2025-01-09 ASSESSMENT — ENCOUNTER SYMPTOMS: GENERAL WELL-BEING: GOOD

## 2025-01-09 NOTE — ASSESSMENT & PLAN NOTE
Chronic, stable. Last lipid panel March 2024. She currently does not take any medications, just manages with therapeutic lifestyle. We discussed her dietary/lifestyle regimen. Follow up with PCP for continued monitoring and management.  Lab Results   Component Value Date/Time    CHOLSTRLTOT 182 03/20/2024 09:05 AM    TRIGLYCERIDE 73 03/20/2024 09:05 AM    HDL 65 03/20/2024 09:05 AM     (H) 03/20/2024 09:05 AM

## 2025-01-09 NOTE — ASSESSMENT & PLAN NOTE
Chronic, stable. Last DEXA 2024 with osteopenia. She does take calcium and vitamin D supplementation. Does engage in weightbearing exercise. No hx of fragility fractures. Continue to follow up with PCP as previously scheduled.

## 2025-01-09 NOTE — ASSESSMENT & PLAN NOTE
Chronic, worsening issue. She had underwent neuropsych eval and sees Neurology who have diagnosed her with MCI. She did just repeat her brain MRI for review of any vascular component to memory loss. We discussed cognitively stimulating activities. Follow up with Neuro as per routine.

## 2025-01-10 ENCOUNTER — OFFICE VISIT (OUTPATIENT)
Dept: FAMILY PLANNING/WOMEN'S HEALTH CLINIC | Facility: PHYSICIAN GROUP | Age: 70
End: 2025-01-10
Payer: MEDICARE

## 2025-01-10 VITALS
HEART RATE: 74 BPM | HEIGHT: 63 IN | WEIGHT: 145 LBS | BODY MASS INDEX: 25.69 KG/M2 | SYSTOLIC BLOOD PRESSURE: 102 MMHG | OXYGEN SATURATION: 96 % | DIASTOLIC BLOOD PRESSURE: 58 MMHG

## 2025-01-10 DIAGNOSIS — G47.33 OSA ON CPAP: ICD-10-CM

## 2025-01-10 DIAGNOSIS — G31.84 MILD COGNITIVE IMPAIRMENT: ICD-10-CM

## 2025-01-10 DIAGNOSIS — M85.852 OSTEOPENIA OF NECK OF LEFT FEMUR: ICD-10-CM

## 2025-01-10 DIAGNOSIS — E78.5 DYSLIPIDEMIA: ICD-10-CM

## 2025-01-10 DIAGNOSIS — F32.0 CURRENT MILD EPISODE OF MAJOR DEPRESSIVE DISORDER WITHOUT PRIOR EPISODE (HCC): ICD-10-CM

## 2025-01-10 PROCEDURE — 3078F DIAST BP <80 MM HG: CPT

## 2025-01-10 PROCEDURE — 3074F SYST BP LT 130 MM HG: CPT

## 2025-01-10 PROCEDURE — G0439 PPPS, SUBSEQ VISIT: HCPCS

## 2025-01-10 SDOH — ECONOMIC STABILITY: FOOD INSECURITY: HOW HARD IS IT FOR YOU TO PAY FOR THE VERY BASICS LIKE FOOD, HOUSING, MEDICAL CARE, AND HEATING?: NOT HARD AT ALL

## 2025-01-10 SDOH — ECONOMIC STABILITY: FOOD INSECURITY: WITHIN THE PAST 12 MONTHS, THE FOOD YOU BOUGHT JUST DIDN'T LAST AND YOU DIDN'T HAVE MONEY TO GET MORE.: NEVER TRUE

## 2025-01-10 SDOH — ECONOMIC STABILITY: HOUSING INSECURITY: AT ANY TIME IN THE PAST 12 MONTHS, WERE YOU HOMELESS OR LIVING IN A SHELTER (INCLUDING NOW)?: NO

## 2025-01-10 SDOH — ECONOMIC STABILITY: FOOD INSECURITY: WITHIN THE PAST 12 MONTHS, YOU WORRIED THAT YOUR FOOD WOULD RUN OUT BEFORE YOU GOT THE MONEY TO BUY MORE.: NEVER TRUE

## 2025-01-10 SDOH — ECONOMIC STABILITY: HOUSING INSECURITY: IN THE LAST 12 MONTHS, WAS THERE A TIME WHEN YOU WERE NOT ABLE TO PAY THE MORTGAGE OR RENT ON TIME?: NO

## 2025-01-10 SDOH — ECONOMIC STABILITY: HOUSING INSECURITY: IN THE PAST 12 MONTHS, HOW MANY TIMES HAVE YOU MOVED WHERE YOU WERE LIVING?: 1

## 2025-01-10 SDOH — ECONOMIC STABILITY: TRANSPORTATION INSECURITY: IN THE PAST 12 MONTHS, HAS LACK OF TRANSPORTATION KEPT YOU FROM MEDICAL APPOINTMENTS OR FROM GETTING MEDICATIONS?: NO

## 2025-01-10 ASSESSMENT — ACTIVITIES OF DAILY LIVING (ADL): LACK_OF_TRANSPORTATION: NO

## 2025-01-10 ASSESSMENT — PATIENT HEALTH QUESTIONNAIRE - PHQ9
5. POOR APPETITE OR OVEREATING: 0 - NOT AT ALL
SUM OF ALL RESPONSES TO PHQ QUESTIONS 1-9: 5
CLINICAL INTERPRETATION OF PHQ2 SCORE: 2

## 2025-01-10 ASSESSMENT — FIBROSIS 4 INDEX: FIB4 SCORE: 2.06

## 2025-01-10 NOTE — PROGRESS NOTES
Comprehensive Health Assessment Program     Karuna Reid is a 69 y.o. here for her comprehensive health assessment.    Patient Active Problem List    Diagnosis Date Noted    Current mild episode of major depressive disorder without prior episode (HCC) 01/10/2025    Mild cognitive impairment 12/21/2024    FREDDY on CPAP 03/27/2023    Trigger finger, right middle finger 01/11/2023    Weight gain 03/17/2022    Altered consciousness 03/17/2022    BMI 25.0-25.9,adult 03/09/2022    Degenerative disc disease, cervical 03/09/2022    Vitamin D deficiency 02/15/2022    Dyslipidemia 07/13/2021    S/P laparoscopy 06/16/2021    Pelvic floor relaxation 05/20/2021    Hx of total hysterectomy with removal of both tubes and ovaries 03/05/2021    Memory loss of unknown cause 09/15/2020    Osteopenia of neck of left femur 01/22/2018    Menopausal and postmenopausal disorder        Current Outpatient Medications   Medication Sig Dispense Refill    Krill Oil 500 MG Cap Take 1 mg by mouth every day.      vitamin D3 (CHOLECALCIFEROL) 1000 Unit (25 mcg) Tab Take 1,000 Units by mouth 2 times a day. 1 in the morning, 1 at dinner      ascorbic acid (VITAMIN C) 500 MG tablet Take 500 mg by mouth every day. 2 in the morning, 1 at dinner      Zinc 30 MG Tab Take 30 mg by mouth every day.      lysine 500 MG Tab Take 500 mg by mouth 2 times a day.      Cyanocobalamin (B-12) 2500 MCG Tab Take 2,500 mcg by mouth every day.      Tetrahydrozoline-Zn Sulfate (EYE DROPS AR OP) Administer  into affected eye(s) every day.      vitamin e (VITAMIN E) 400 UNIT Cap Take 400 Units by mouth every day.      Multiple Vitamins-Minerals (MULTIVITAMIN ADULT PO) Take  by mouth.      Biotin 2500 MCG Cap Take  by mouth.      calcium carbonate (TUMS) 500 MG Chew Tab Chew 1 Tablet every day.       No current facility-administered medications for this visit.          Current supplements as per medication list.     Allergies:   Hydrocodone, Other environmental,  Prednisone, and Other drug  Social History     Tobacco Use    Smoking status: Former     Current packs/day: 0.00     Average packs/day: 1 pack/day for 28.0 years (28.0 ttl pk-yrs)     Types: Cigarettes     Start date: 1970     Quit date: 1993     Years since quittin.0    Smokeless tobacco: Never    Tobacco comments:     No cigarettes since I quit smoking-cold turkey   Vaping Use    Vaping status: Never Used   Substance Use Topics    Alcohol use: Not Currently     Alcohol/week: 0.6 oz     Types: 1 Glasses of wine per week    Drug use: No     Family History   Problem Relation Age of Onset    Psychiatric Illness Mother         Alzheimer's    Alzheimer's Disease Mother          of Alzheimer's Disease    Cancer Maternal Aunt         Skin, Breast,Liver    Cancer Maternal Aunt         Cancer    Cancer Maternal Uncle         Blood Cancer     Karuna  has a past medical history of Abdominal pain, Altered consciousness (2022), Arthritis, Asthma, Bowel habit changes, Chest tightness, Chickenpox, Cough, Daytime sleepiness, Dental disorder, Dyslipidemia (2021), Frequent urination, GERD (gastroesophageal reflux disease), Influenza, Menopausal and postmenopausal disorder, Muscle disorder, Osteoporosis, Peripheral vascular disease, unspecified (HCC) (2021), Shortness of breath, Swelling of lower extremity, Tonsillitis, Urinary bladder disorder, Vitamin D deficiency (02/15/2022), Wears glasses, and Weight gain (2022).    She has no past medical history of Addisons disease ([facility]), Adrenal disorder ([facility]), Allergy, Anemia, Anxiety, Blood transfusion without reported diagnosis, Cataract, Clotting disorder ([facility]), COPD (chronic obstructive pulmonary disease) ([facility]), Cushings syndrome ([facility]), Depression, Diabetic neuropathy ([facility]), Glaucoma, Goiter, Head ache, HIV (human immunodeficiency virus infection) ([facility]), IBD (inflammatory bowel disease), Meningitis, Migraine, Parathyroid disorder ([facility]), Pituitary  disease (HCC), Pulmonary emphysema (HCC), Sickle cell disease (HCC), Stroke (HCC), Thyroid disease, or Tuberculosis.   Past Surgical History:   Procedure Laterality Date    PB INCISE FINGER TENDON SHEATH Right 1/27/2023    Procedure: RIGHT MIDDLE FINGER TRIGGER RELEASE, REPAIRS AS INDICATED;  Surgeon: Leelee Jackson M.D.;  Location: [facility];  Service: Orthopedics    DC LAP,DIAGNOSTIC ABDOMEN  06/16/2021    Procedure: diagnostic laparoscopy;  Surgeon: Librado Rosenberg M.D.;  Location: SURGERY SAME DAY [facility];  Service: Gynecology    MASS EXCISION ORTHO Left 07/17/2020    Procedure: EXCISION, MASS-FOOT soft tissue TUMOR;  Surgeon: JESSE Wisdom.[email or web address];  Location: SURGERY SAME DAY [facility];  Service: Podiatry    CYSTOSCOPY  10/19/2016    Procedure: CYSTOSCOPY;  Surgeon: Lenny Cazares M.D.;  Location: SURGERY [facility];  Service:     HYSTERECTOMY ROBOTIC  10/19/2016    Procedure: HYSTERECTOMY ROBOTIC Total with BILATERAL SALPINGO-OOPHORECTOMY;  Surgeon: Lenny Cazares M.D.;  Location: SURGERY [facility];  Service:     ANTERIOR AND POSTERIOR REPAIR  10/19/2016    Procedure: POSTERIOR REPAIR;  Surgeon: Lenny Cazares M.D.;  Location: SURGERY [facility];  Service:     VAGINAL SUSPENSION  10/19/2016    Procedure: VAGINAL SUSPENSION Uterosacral, and  McCalls Culdoplasty;  Surgeon: Lenny Cazares M.D.;  Location: SURGERY [facility];  Service:     TONSILLECTOMY  04/18/2014    Performed by Nirali Dos Santos M.D. at [facility]    OTHER SURGICAL PROCEDURE  1989    Dr Doss; Neck superficial exploration    TONSILLECTOMY  1959    HYSTERECTOMY LAPAROSCOPY      OTHER      Throat exploration    OTHER ORTHOPEDIC SURGERY Left     mass excision       Screening:  In the last six months have you experienced any leakage of urine? No    Depression Screening  Little interest or pleasure in doing things?  1 - several days  Feeling down, depressed , or  hopeless? 1 - several days  Trouble falling or staying asleep, or sleeping too much?  0 - not at all  Feeling tired or having little energy?  1 - several days  Poor appetite or overeating?  0 - not at all  Feeling bad about yourself - or that you are a failure or have let yourself or your family down? 1 - several days  Trouble concentrating on things, such as reading the newspaper or watching television? 1 - several days  Moving or speaking so slowly that other people could have noticed.  Or the opposite - being so fidgety or restless that you have been moving around a lot more than usual?  0 - not at all  Thoughts that you would be better off dead, or of hurting yourself?  0 - not at all  Patient Health Questionnaire Score: 5    If depressive symptoms identified deferred to follow up visit unless specifically addressed in assessment and plan.    Interpretation of PHQ-9 Total Score   Score Severity   1-4 No Depression   5-9 Mild Depression   10-14 Moderate Depression   15-19 Moderately Severe Depression   20-27 Severe Depression    Screening for Cognitive Impairment  Do you or any of your friends or family members have any concern about your memory? Yes she has known MCI.   Three Minute Recall (Leader, Season, Table) 3/3    Jerod clock face with all 12 numbers and set the hands to show 10 minutes after 11.  Yes    Cognitive concerns identified deferred for follow up unless specifically addressed in assessment and plan.    Fall Risk Assessment  Has the patient had two or more falls in the last year or any fall with injury in the last year?  No    Safety Assessment  Do you always wear your seatbelt?  Yes  Any changes to home needed to function safely? No  Difficulty hearing.  No  Patient counseled about all safety risks that were identified.    Functional Assessment ADLs  Are there any barriers preventing you from cooking for yourself or meeting nutritional needs?  No.    Are there any barriers preventing you from  driving safely or obtaining transportation?  Yes. Does not drive for her safety   Are there any barriers preventing you from using a telephone or calling for help?  No    Are there any barriers preventing you from shopping?  No.    Are there any barriers preventing you from taking care of your own finances?  No    Are there any barriers preventing you from managing your medications?  No    Are there any barriers preventing you from showering, bathing or dressing yourself? No    Are there any barriers preventing you from doing housework or laundry? No    Are there any barriers preventing you from using the toilet?No    Are you currently engaging in any exercise or physical activity?  No.  She wants to start going to the gym.     Self-Assessment of Health  What is your perception of your health? Good    Do you sleep more than six hours a night? Yes    In the past 7 days, how much did pain keep you from doing your normal work? Some    Do you spend quality time with family or friends (virtually or in person)? Yes    Do you usually eat a heart healthy diet that constists of a variety of fruits, vegetables, whole grains and fiber? Yes    Do you eat foods high in fat and/or Fast Food more than three times per week? No    How concerned are you that your medical conditions are not being well managed? Not at all    Are you worried that in the next 2 months, you may not have stable housing that you own, rent, or stay in as part of a household? No        Advance Care Planning  Do you have an Advance Directive, Living Will, Durable Power of , or POLST? Yes    Living Will     is not on file - instructed patient to bring in a copy to scan into their chart      Health Maintenance Summary            Overdue - Annual Wellness Visit (Yearly) Overdue since 1/5/2025 01/05/2024  Level of Service: [place] ANNUAL WELLNESS VISIT-INCLUDES PPPS SUBSEQUE*    03/28/2023  Subsequent Annual Wellness Visit - Includes PPPS ()     03/27/2023  Level of Service: [place] ANNUAL WELLNESS VISIT-INCLUDES PPPS SUBSEQUE*    03/09/2022  Level of Service: [place] ANNUAL WELLNESS VISIT-INCLUDES PPPS SUBSEQUE*    10/11/2021  Level of Service: [place] PREVENTIVE VISIT,EST,65 & OVER    Only the first 5 history entries have been loaded, but more history exists.              Bone Density Scan (Every 2 Years) Next due on 1/25/2026 01/25/2024  DS-BONE DENSITY STUDY (DEXA)    12/09/2021  DS-BONE DENSITY STUDY (DEXA)    12/05/2019  DS-BONE DENSITY STUDY (DEXA)    12/06/2017  DS-BONE DENSITY STUDY (DEXA)    01/21/2008  DS-BONE DENSITY STUDY (DEXA)    Only the first 5 history entries have been loaded, but more history exists.              Scheduled - Mammogram (Every 2 Years) Scheduled for 3/11/2025      03/07/2024  MA-SCREENING MAMMO BILAT W/TOMOSYNTHESIS W/CAD    03/06/2023  MA-SCREENING MAMMO BILAT W/TOMOSYNTHESIS W/CAD    03/03/2022  MA-SCREENING MAMMO BILAT W/TOMOSYNTHESIS W/CAD    03/01/2021  MA-SCREENING MAMMO BILAT W/TOMOSYNTHESIS W/CAD    02/29/2020  MA-SCREENING MAMMO BILAT W/TOMOSYNTHESIS W/CAD    Only the first 5 history entries have been loaded, but more history exists.              Colorectal Cancer Screening (Colonoscopy - Every 5 Years) Next due on 4/22/2026 04/22/2021  Colonoscopy (Done - 11/23/2020)    10/30/2015  AMB REFERRAL TO GI FOR COLONOSCOPY    09/09/2005  AMB REFERRAL TO GI FOR COLONOSCOPY              IMM DTaP/Tdap/Td Vaccine (3 - Td or Tdap) Next due on 7/14/2030 07/14/2020  Imm Admin: Tdap Vaccine    07/14/2010  Imm Admin: Tdap Vaccine              Hepatitis C Screening  Tentatively Complete      09/14/2019  Hepatitis C Antibody component of HEP C VIRUS ANTIBODY              Zoster (Shingles) Vaccines (Series Information) Completed      11/16/2020  Imm Admin: Zoster Vaccine Recombinant (RZV) (SHINGRIX)    08/26/2020  Imm Admin: Zoster Vaccine Recombinant (RZV) (SHINGRIX)    01/20/2014  Imm Admin: Zoster Vaccine Live (ZVL) (Zostavax) -  HISTORICAL DATA              Pneumococcal Vaccine: 65+ Years (Series Information) Completed      07/07/2021  Imm Admin: Pneumococcal polysaccharide vaccine (PPSV-23)    06/19/2020  Imm Admin: Pneumococcal Conjugate Vaccine (Prevnar/PCV-13)              Hepatitis A Vaccine (Hep A) (Series Information) Aged Out      No completion history exists for this topic.              Hepatitis B Vaccine (Hep B) (Series Information) Aged Out      No completion history exists for this topic.              HPV Vaccines (Series Information) Aged Out      No completion history exists for this topic.              Polio Vaccine (Inactivated Polio) (Series Information) Aged Out      No completion history exists for this topic.              Meningococcal Immunization (Series Information) Aged Out      No completion history exists for this topic.              Discontinued - Cervical Cancer Screening  Discontinued        Frequency changed to Never automatically (Topic No Longer Applies)    12/07/2015  PAP IG (IMAGE GUIDED)              Discontinued - Influenza Vaccine  Discontinued      09/24/2021  Imm Admin: Influenza Vaccine Adult HD    08/26/2020  Imm Admin: Influenza Vaccine Adult HD    10/05/2019  Imm Admin: Influenza Vaccine Quad Inj (Pf)    09/26/2018  Imm Admin: Influenza Vaccine Quad Inj (Pf)    10/11/2017  Imm Admin: Influenza Vaccine Quad Inj (Pf)    Only the first 5 history entries have been loaded, but more history exists.              Discontinued - COVID-19 Vaccine  Discontinued      No completion history exists for this topic.                    Patient Care Team:  Felisa Danielle M.D. as PCP - General (Internal Medicine)  Felisa Danielle M.D. as PCP - [facility] (Internal Medicine)  Jorge Badillo M.D. (Neurology)  [facility] [employer] (DME Supplier)    Financial Resource Strain: Low Risk  (1/10/2025)    Overall Financial Resource Strain (CARDIA)     Difficulty of Paying Living Expenses: Not hard at all      Transportation  "Needs: No Transportation Needs (1/10/2025)    PRAPARE - Transportation     Lack of Transportation (Medical): No     Lack of Transportation (Non-Medical): No      Food Insecurity: No Food Insecurity (1/10/2025)    Hunger Vital Sign     Worried About Running Out of Food in the Last Year: Never true     Ran Out of Food in the Last Year: Never true        Encounter Vitals  Blood Pressure : 102/58  Pulse: 74  Pulse Oximetry: 96 %  Weight: 65.8 kg (145 lb)  Height: 160 cm (5' 3\")  BMI (Calculated): 25.69     Physical Exam:  Constitutional: NAD  HENMT: NC/AT, EOMI, no thyromegaly  Cardiovascular: RRR, No m/r/g  Lungs: CTAB, no w/r/r  Extremities: No c/c/e  Neurologic: Alert & oriented x3, CN II-XII grossly intact    Assessment and Plan. The following treatment and monitoring plan is recommended:  Dyslipidemia  Chronic, stable. Last lipid panel March 2024. She currently does not take any medications, just manages with therapeutic lifestyle. We discussed her dietary/lifestyle regimen. Follow up with PCP for continued monitoring and management.  Lab Results   Component Value Date/Time    CHOLSTRLTOT 182 03/20/2024 09:05 AM    TRIGLYCERIDE 73 03/20/2024 09:05 AM    HDL 65 03/20/2024 09:05 AM     (H) 03/20/2024 09:05 AM         Mild cognitive impairment  Chronic, worsening issue. She had underwent neuropsych eval and sees Neurology who have diagnosed her with MCI. She did just repeat her brain MRI for review of any vascular component to memory loss. We discussed cognitively stimulating activities. Follow up with Neuro as per routine.    FREDDY on CPAP  Chronic, stable. Compliant with CPAP at night. Does not require supplemental O2. Follows with pulmonology.       Osteopenia of neck of left femur  Chronic, stable. Last DEXA 2024 with osteopenia. She does take calcium and vitamin D supplementation. Does engage in weightbearing exercise. No hx of fragility fractures. Continue to follow up with PCP as previously " scheduled.      Current mild episode of major depressive disorder without prior episode (HCC)  Chronic, stable. PHQ-9 in office today is 5. States she has had some marital stress and that is not helpful for her memory. She underwent neuropsych evaluation and they encouraged her to start individual counseling. She is interested in couples counseling, but has to convince her . They do offer this counseling through her [facility]. She would prefer to hold off on individual therapy at this time. Follow up with PCP as needed to discuss this further.     Services suggested: No services needed at this time  Health Care Screening: Age-appropriate preventive services recommended by USPTF and ACIP covered by Medicare were discussed today. Services ordered if indicated and agreed upon by the patient.  Referrals offered: Community-based lifestyle interventions to reduce health risks and promote self-management and wellness, fall prevention, nutrition, physical activity, tobacco-use cessation, weight loss, and mental health services as per orders if indicated.    Discussion today about general wellness and lifestyle habits:    Prevent falls and reduce trip hazards; Cautioned about securing or removing rugs.  Have a working fire alarm and carbon monoxide detector.  Engage in regular physical activity and social activities.    Follow-up: Return for appointment with Primary Care Provider as needed..

## 2025-01-11 NOTE — ASSESSMENT & PLAN NOTE
Chronic, stable. PHQ-9 in office today is 5. States she has had some marital stress and that is not helpful for her memory. She underwent neuropsych evaluation and they encouraged her to start individual counseling. She is interested in couples counseling, but has to convince her . They do offer this counseling through her [facility]. She would prefer to hold off on individual therapy at this time. Follow up with PCP as needed to discuss this further.

## 2025-01-21 ENCOUNTER — TELEPHONE (OUTPATIENT)
Dept: SLEEP MEDICINE | Facility: MEDICAL CENTER | Age: 70
End: 2025-01-21
Payer: MEDICARE

## 2025-01-22 NOTE — TELEPHONE ENCOUNTER
Caller: Karuna Reid    Topic/issue: Patient has an order for an overnight sleep study as well as a MSLT that was put in by Dick Arreola. Before scheduling patient would like to speak with someone in regards to the MSLT part of the test. Patient is unsure about being at the sleep center for the whole time - has questions about if food is provided or she needs to bring food? Would she be allowed to leave in between? Etc. Could not reach the  - Please callback to advise.     Callback Number: [phone]    Thank you,  Марина ROSAS

## 2025-01-22 NOTE — TELEPHONE ENCOUNTER
Spoke to patients advised to take breakfast, and lunch with her and informed her she cannot leave in between. no further actions needs to be taken. Patient understood.

## 2025-02-28 ENCOUNTER — PATIENT MESSAGE (OUTPATIENT)
Dept: SLEEP MEDICINE | Facility: MEDICAL CENTER | Age: 70
End: 2025-02-28
Payer: MEDICARE

## 2025-03-03 ENCOUNTER — APPOINTMENT (OUTPATIENT)
Dept: SLEEP MEDICINE | Facility: MEDICAL CENTER | Age: 70
End: 2025-03-03
Attending: NURSE PRACTITIONER
Payer: MEDICARE

## 2025-03-03 DIAGNOSIS — G47.10 HYPERSOMNOLENCE: ICD-10-CM

## 2025-03-03 DIAGNOSIS — G47.33 OSA (OBSTRUCTIVE SLEEP APNEA): ICD-10-CM

## 2025-03-03 PROCEDURE — 95811 POLYSOM 6/>YRS CPAP 4/> PARM: CPT | Performed by: STUDENT IN AN ORGANIZED HEALTH CARE EDUCATION/TRAINING PROGRAM

## 2025-03-04 ENCOUNTER — APPOINTMENT (OUTPATIENT)
Dept: SLEEP MEDICINE | Facility: MEDICAL CENTER | Age: 70
End: 2025-03-04
Attending: NURSE PRACTITIONER
Payer: MEDICARE

## 2025-03-04 DIAGNOSIS — G47.33 OSA (OBSTRUCTIVE SLEEP APNEA): ICD-10-CM

## 2025-03-04 DIAGNOSIS — G47.10 HYPERSOMNOLENCE: ICD-10-CM

## 2025-03-04 PROCEDURE — 95805 MULTIPLE SLEEP LATENCY TEST: CPT | Performed by: STUDENT IN AN ORGANIZED HEALTH CARE EDUCATION/TRAINING PROGRAM

## 2025-03-04 NOTE — PROCEDURES
Patient: TWIN ABRAHAM  ID: [id number] Date: 3/3/2025   MONTAGE: Standard  STUDY TYPE: Treatment  RECORDING TECHNIQUE:   After the scalp was prepared, gold plated electrodes were applied to the scalp according to the International 10-20 System. EEG (electroencephalogram) was continuously monitored from the O1-M2, O2-M1, C3-M2, C4-M1, F3-M2, and F4-M1. EOGs (electrooculograms) were monitored by electrodes placed at the left and right outer canthi. Chin EMG (electromyogram) was monitored by electrodes placed on the mentalis and sub-mentalis muscles. Nasal and oral airflow were monitored using a triple port thermocouple as well as oronasal pressure transducer. Respiratory effort was measured by inductive plethysmography technology employing abdominal and thoracic belts. Blood oxygen saturation and pulse were monitored by pulse oximetry. Heart rhythm was monitored by surface electrocardiogram. Leg EMG was studied using surface electrodes placed on left and right anterior tibialis. A microphone was used to monitor tracheal sounds and snoring. Body position was monitored and documented by technician observation.   SCORING CRITERIA:   A modification of the AASM manual for scoring of sleep and associated events was used. Obstructive apneas were scored by cessation of airflow for at least 10 seconds with continuing respiratory effort. Central apneas were scored by cessation of airflow for at least 10 seconds with no respiratory effort. Hypopneas were scored by a 30% or more reduction in airflow for at least 10 seconds accompanied by arterial oxygen desaturation of 3% or an arousal. For CMS (Medicare) patients, per AASM rule 1B, hypopneas are scored by 30% with mild reduction in airflow for at least 10 seconds accompanied by arterial saturation decreased at 4%.    Study start time was 08:57:40 PM. Diagnostic recording time was 9h 0.0m with a total sleep time of 7h 50.5m resulting in a sleep efficiency of 87.13%%. Sleep latency  from the start of the study was 03 minutes and the latency from sleep to REM was 149 minutes. In total,77 arousals were scored for an arousal index of 9.8.  Respiratory:  There were a total of 0 apneas consisting of 0 obstructive apneas, 0 mixed apneas, and 0 central apneas. A total of 6 hypopneas were scored. The apnea index was 0.00 per hour and the hypopnea index was 0.77 per hour resulting in an overall 4% AHI of 0.77. AHI during REM was 0.0 and AHI while supine was 0.77.  Oximetry:  There was a mean oxygen saturation of 93.0%. The minimum oxygen saturation in NREM was 86.0 % and in REM was 87.0%. The patient spent 8.0 minutes of TST with SaO2 <88%.  Cardiac:  The highest heart rate seen while awake was 80 BPM while the highest heart rate during sleep was 80 BPM with an average sleeping heart rate of 62 BPM.  Limb Movements:  There were a total of 0 PLMs during sleep which resulted in a PLMS index of 0.0. Of these, 11 were associated with arousals which resulted in a PLMS arousal index of 1.4.  Titration:   CPAP was tried from 6 to 7  This was a fully attended sleep study. This test was technically adequate. This patient was titrated on CPAP starting at 6 cm of water pressure. Patient was titrated up to 7 cm of water pressure. Patient did best at 7 cm of water pressure. Patient spent 306 minutes at that pressure and the AHI was 0.4 which is considered treated obstructive sleep apnea.     Impression:  1.  CPAP was used during study  2.  Sleep apnea was controlled with overall AHI of 0.77 events an hour  3.  Mild nocturnal hypoxia was present with time at or below 88% saturation 8 minutes  4.  Sleep latency 3 minutes, REM latency 149 minutes  5.  Total sleep time 7 hours 51 minutes  6.  Supine REM sleep was seen during study    Recommendations:  I recommend continuing CPAP 7 cmH2O cmH2O.  Patient used Small DreamWear FF mask during night of study.  Patient proceeded to MSLT.  I also recommend 30 day compliance  download to assess the efficacy to the recommended pressure, measure leak, apnea hypopnea index and compliance for further outpatient monitoring and management of PAP therapy. In some cases alternative treatment options may be proven effective in resolving sleep apnea. These options include upper airway surgery, the use of a dental orthotic, weight loss, or positional therapy. Clinical correlation is required. In general patients with sleep apnea are advised to avoid alcohol, sedatives and not to operate a motor vehicle while drowsy.  Untreated sleep apnea increases the risk for cardiovascular and neurovascular disease.

## 2025-03-11 ENCOUNTER — HOSPITAL ENCOUNTER (OUTPATIENT)
Dept: RADIOLOGY | Facility: MEDICAL CENTER | Age: 70
End: 2025-03-11
Attending: INTERNAL MEDICINE
Payer: MEDICARE

## 2025-03-11 DIAGNOSIS — Z12.31 VISIT FOR SCREENING MAMMOGRAM: ICD-10-CM

## 2025-03-11 PROCEDURE — 77067 SCR MAMMO BI INCL CAD: CPT

## 2025-03-11 NOTE — PROCEDURES
Multiple Sleep Latency Test MSLT        Recording Technique:  EEG was continuously moniotred from O1-M2,O2-M1,C3-M2,C3-M2,C4-M1,F3-M2 AND F4-M1. The EOGs and EMG was monitored, heart rhythm was monitored by EKG.      Scoring  Nap1: Sleep latency: 4.5 min, No SOREMP  Nap2: Sleep latency: 2.0 min, No SOREMP  Nap3: Sleep latency: 1.5 min, No SOREMP  Nap4: Sleep latency: 4.5 min, No SOREMP  Nap5: Sleep latency: 1.0 min, No SOREMP    Mean Sleep latency: 2.7 min   Total # of SOREMPs: 0       Interpretation:    PSG:  Controlled obstructive sleep apnea (FREDDY) with apnea+hypopnea index (AHI) of 0.77/hour in general. The O2 sat rosa was 86%. Sleep Latency 3 min, REM latency 149 min.     MSLT  1. The Multiple Sleep Latency Test (MSLT) was done on the next day with 5 naps. There was evidence of hypersomnolence with a short, 5-nap mean sleep latency of 2.7 min.    0 sleep onset REM periods (SOREMPs) were seen.    Initial MSLT can be negative for SOREMPs in a minority of pts with narcolepsy.    Further clinical correlation is recommended.         Patient did not submit a sleep log for the two weeks preceding the MSLT.      Urine drug screen on the morning of the MSLT was not done prior to MSLT.       Recommendations:      The MSLT showed evidence of hypersomnia.  It is recommended patient follow-up to discuss results and management.  Patient's with excessive daytime sleepiness are cautioned against driving if drowsy.  Clinical correlation is needed.

## 2025-03-19 ENCOUNTER — OFFICE VISIT (OUTPATIENT)
Dept: SLEEP MEDICINE | Facility: MEDICAL CENTER | Age: 70
End: 2025-03-19
Attending: NURSE PRACTITIONER
Payer: MEDICARE

## 2025-03-19 VITALS
HEART RATE: 78 BPM | SYSTOLIC BLOOD PRESSURE: 102 MMHG | BODY MASS INDEX: 25.5 KG/M2 | RESPIRATION RATE: 16 BRPM | HEIGHT: 63 IN | DIASTOLIC BLOOD PRESSURE: 62 MMHG | WEIGHT: 143.9 LBS | OXYGEN SATURATION: 96 %

## 2025-03-19 DIAGNOSIS — G47.11 IDIOPATHIC HYPERSOMNOLENCE: ICD-10-CM

## 2025-03-19 DIAGNOSIS — G47.19 EXCESSIVE DAYTIME SLEEPINESS: ICD-10-CM

## 2025-03-19 DIAGNOSIS — G47.33 OSA (OBSTRUCTIVE SLEEP APNEA): ICD-10-CM

## 2025-03-19 DIAGNOSIS — G47.10 HYPERSOMNOLENCE: ICD-10-CM

## 2025-03-19 PROCEDURE — 3074F SYST BP LT 130 MM HG: CPT | Performed by: NURSE PRACTITIONER

## 2025-03-19 PROCEDURE — 99214 OFFICE O/P EST MOD 30 MIN: CPT | Performed by: NURSE PRACTITIONER

## 2025-03-19 PROCEDURE — 3078F DIAST BP <80 MM HG: CPT | Performed by: NURSE PRACTITIONER

## 2025-03-19 PROCEDURE — 99213 OFFICE O/P EST LOW 20 MIN: CPT | Performed by: NURSE PRACTITIONER

## 2025-03-19 RX ORDER — METHYLPHENIDATE HYDROCHLORIDE 10 MG/1
10 TABLET ORAL 2 TIMES DAILY
Qty: 60 TABLET | Refills: 0 | Status: SHIPPED | OUTPATIENT
Start: 2025-03-19 | End: 2025-04-18

## 2025-03-19 ASSESSMENT — FIBROSIS 4 INDEX: FIB4 SCORE: 2.06

## 2025-03-28 ENCOUNTER — TELEPHONE (OUTPATIENT)
Dept: HEALTH INFORMATION MANAGEMENT | Facility: OTHER | Age: 70
End: 2025-03-28
Payer: MEDICARE

## 2025-03-28 NOTE — TELEPHONE ENCOUNTER
Karuna has an appointment scheduled on April 10, 2025 and asking for her annual lab orders please.     Thank you,  Phylicia WHEAT

## 2025-04-01 DIAGNOSIS — R53.83 FATIGUE, UNSPECIFIED TYPE: ICD-10-CM

## 2025-04-01 DIAGNOSIS — E55.9 VITAMIN D DEFICIENCY: ICD-10-CM

## 2025-04-01 DIAGNOSIS — Z13.220 SCREENING CHOLESTEROL LEVEL: ICD-10-CM
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
## 2025-04-03 ENCOUNTER — HOSPITAL ENCOUNTER (OUTPATIENT)
Dept: LAB | Facility: MEDICAL CENTER | Age: 70
End: 2025-04-03
Attending: INTERNAL MEDICINE
Payer: MEDICARE

## 2025-04-03 DIAGNOSIS — R53.83 FATIGUE, UNSPECIFIED TYPE: ICD-10-CM

## 2025-04-03 DIAGNOSIS — E55.9 VITAMIN D DEFICIENCY: ICD-10-CM

## 2025-04-03 DIAGNOSIS — Z13.220 SCREENING CHOLESTEROL LEVEL: ICD-10-CM

## 2025-04-03 LAB
25(OH)D3 SERPL-MCNC: 56 NG/ML (ref 30–100)
ALBUMIN SERPL BCP-MCNC: 4.2 G/DL (ref 3.2–4.9)
ALBUMIN/GLOB SERPL: 1.3 G/DL
ALP SERPL-CCNC: 80 U/L (ref 30–99)
ALT SERPL-CCNC: 21 U/L (ref 2–50)
ANION GAP SERPL CALC-SCNC: 10 MMOL/L (ref 7–16)
AST SERPL-CCNC: 23 U/L (ref 12–45)
BASOPHILS # BLD AUTO: 0.5 % (ref 0–1.8)
BASOPHILS # BLD: 0.03 K/UL (ref 0–0.12)
BILIRUB SERPL-MCNC: 0.5 MG/DL (ref 0.1–1.5)
BUN SERPL-MCNC: 16 MG/DL (ref 8–22)
CALCIUM ALBUM COR SERPL-MCNC: 10.3 MG/DL (ref 8.5–10.5)
CALCIUM SERPL-MCNC: 10.5 MG/DL (ref 8.5–10.5)
CHLORIDE SERPL-SCNC: 106 MMOL/L (ref 96–112)
CHOLEST SERPL-MCNC: 190 MG/DL (ref 100–199)
CO2 SERPL-SCNC: 24 MMOL/L (ref 20–33)
CREAT SERPL-MCNC: 0.74 MG/DL (ref 0.5–1.4)
EOSINOPHIL # BLD AUTO: 0.11 K/UL (ref 0–0.51)
EOSINOPHIL NFR BLD: 1.8 % (ref 0–6.9)
ERYTHROCYTE [DISTWIDTH] IN BLOOD BY AUTOMATED COUNT: 43.1 FL (ref 35.9–50)
FASTING STATUS PATIENT QL REPORTED: NORMAL
GFR SERPLBLD CREATININE-BSD FMLA CKD-EPI: 87 ML/MIN/1.73 M 2
GLOBULIN SER CALC-MCNC: 3.2 G/DL (ref 1.9–3.5)
GLUCOSE SERPL-MCNC: 89 MG/DL (ref 65–99)
HCT VFR BLD AUTO: 44.1 % (ref 37–47)
HDLC SERPL-MCNC: 62 MG/DL
HGB BLD-MCNC: 15 G/DL (ref 12–16)
IMM GRANULOCYTES # BLD AUTO: 0.01 K/UL (ref 0–0.11)
IMM GRANULOCYTES NFR BLD AUTO: 0.2 % (ref 0–0.9)
LDLC SERPL CALC-MCNC: 114 MG/DL
LYMPHOCYTES # BLD AUTO: 1.97 K/UL (ref 1–4.8)
LYMPHOCYTES NFR BLD: 31.7 % (ref 22–41)
MCH RBC QN AUTO: 30.7 PG (ref 27–33)
MCHC RBC AUTO-ENTMCNC: 34 G/DL (ref 32.2–35.5)
MCV RBC AUTO: 90.4 FL (ref 81.4–97.8)
MONOCYTES # BLD AUTO: 0.47 K/UL (ref 0–0.85)
MONOCYTES NFR BLD AUTO: 7.6 % (ref 0–13.4)
NEUTROPHILS # BLD AUTO: 3.63 K/UL (ref 1.82–7.42)
NEUTROPHILS NFR BLD: 58.2 % (ref 44–72)
NRBC # BLD AUTO: 0 K/UL
NRBC BLD-RTO: 0 /100 WBC (ref 0–0.2)
PLATELET # BLD AUTO: 187 K/UL (ref 164–446)
PMV BLD AUTO: 11.4 FL (ref 9–12.9)
POTASSIUM SERPL-SCNC: 4.2 MMOL/L (ref 3.6–5.5)
PROT SERPL-MCNC: 7.4 G/DL (ref 6–8.2)
RBC # BLD AUTO: 4.88 M/UL (ref 4.2–5.4)
SODIUM SERPL-SCNC: 140 MMOL/L (ref 135–145)
TRIGL SERPL-MCNC: 68 MG/DL (ref 0–149)
TSH SERPL DL<=0.005 MIU/L-ACNC: 3.1 UIU/ML (ref 0.38–5.33)
WBC # BLD AUTO: 6.2 K/UL (ref 4.8–10.8)

## 2025-04-03 PROCEDURE — 82306 VITAMIN D 25 HYDROXY: CPT

## 2025-04-03 PROCEDURE — 80053 COMPREHEN METABOLIC PANEL: CPT

## 2025-04-03 PROCEDURE — 84443 ASSAY THYROID STIM HORMONE: CPT

## 2025-04-03 PROCEDURE — 36415 COLL VENOUS BLD VENIPUNCTURE: CPT

## 2025-04-03 PROCEDURE — 80061 LIPID PANEL: CPT

## 2025-04-03 PROCEDURE — 85025 COMPLETE CBC W/AUTO DIFF WBC: CPT

## 2025-04-04 DIAGNOSIS — G31.84 MILD COGNITIVE IMPAIRMENT: ICD-10-CM

## 2025-04-10 ENCOUNTER — OFFICE VISIT (OUTPATIENT)
Dept: MEDICAL GROUP | Facility: PHYSICIAN GROUP | Age: 70
End: 2025-04-10
Payer: MEDICARE

## 2025-04-10 VITALS
TEMPERATURE: 98.2 F | DIASTOLIC BLOOD PRESSURE: 72 MMHG | RESPIRATION RATE: 14 BRPM | OXYGEN SATURATION: 95 % | BODY MASS INDEX: 25.41 KG/M2 | SYSTOLIC BLOOD PRESSURE: 120 MMHG | HEART RATE: 64 BPM | WEIGHT: 143.4 LBS | HEIGHT: 63 IN

## 2025-04-10 DIAGNOSIS — G31.84 MILD COGNITIVE IMPAIRMENT: ICD-10-CM

## 2025-04-10 DIAGNOSIS — E78.5 DYSLIPIDEMIA: ICD-10-CM

## 2025-04-10 DIAGNOSIS — G47.10 HYPERSOMNIA: ICD-10-CM

## 2025-04-10 PROBLEM — R41.3 MEMORY LOSS OF UNKNOWN CAUSE: Status: RESOLVED | Noted: 2020-09-15 | Resolved: 2025-04-10

## 2025-04-10 PROBLEM — Z98.890 S/P LAPAROSCOPY: Status: RESOLVED | Noted: 2021-06-16 | Resolved: 2025-04-10

## 2025-04-10 PROCEDURE — 3078F DIAST BP <80 MM HG: CPT | Performed by: INTERNAL MEDICINE

## 2025-04-10 PROCEDURE — 99214 OFFICE O/P EST MOD 30 MIN: CPT | Performed by: INTERNAL MEDICINE

## 2025-04-10 PROCEDURE — 3074F SYST BP LT 130 MM HG: CPT | Performed by: INTERNAL MEDICINE

## 2025-04-10 ASSESSMENT — FIBROSIS 4 INDEX: FIB4 SCORE: 1.88
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
## 2025-04-22 NOTE — Clinical Note
REFERRAL APPROVAL NOTICE         Sent on April 22, 2025                   Karuna Reid  [address] [place] [id number]                   Dear Ms. Reid,    After a careful review of the medical information and benefit coverage, [facility] has processed your referral. See below for additional details.    If applicable, you must be actively enrolled with your insurance for coverage of the authorized service. If you have any questions regarding your coverage, please contact your insurance directly.    REFERRAL INFORMATION   Referral #:  [id number]  Referred-To Department    Referred-By Provider:  Behavioral Health    Jorge Badillo M.D.   Behavioral Health Outpatient      [address]  [place] [id number] [phone] [address]  [place] [id number] [phone]    Referral Start Date:  04/04/2025  Referral End Date:   04/04/2026             SCHEDULING  If you do not already have an appointment, please call [phone] to make an appointment.     MORE INFORMATION  If you do not already have a [employer] account, sign up at: [email or web address]  You can access your medical information, make appointments, see lab results, billing information, and more.  If you have questions regarding this referral, please contact  the [facility] Referrals department at:             [phone]. Monday - Friday 8:00AM - 5:00PM.     Sincerely,    [facility]

## 2025-04-24 ENCOUNTER — OFFICE VISIT (OUTPATIENT)
Dept: URGENT CARE | Facility: CLINIC | Age: 70
End: 2025-04-24
Payer: MEDICARE

## 2025-04-24 VITALS
HEIGHT: 63 IN | WEIGHT: 143 LBS | BODY MASS INDEX: 25.34 KG/M2 | HEART RATE: 88 BPM | DIASTOLIC BLOOD PRESSURE: 70 MMHG | TEMPERATURE: 98.6 F | SYSTOLIC BLOOD PRESSURE: 122 MMHG | OXYGEN SATURATION: 96 % | RESPIRATION RATE: 18 BRPM

## 2025-04-24 DIAGNOSIS — R51.9 ACUTE NONINTRACTABLE HEADACHE, UNSPECIFIED HEADACHE TYPE: ICD-10-CM

## 2025-04-24 DIAGNOSIS — R09.81 NASAL CONGESTION: ICD-10-CM

## 2025-04-24 PROCEDURE — 3078F DIAST BP <80 MM HG: CPT | Performed by: FAMILY MEDICINE

## 2025-04-24 PROCEDURE — 3074F SYST BP LT 130 MM HG: CPT | Performed by: FAMILY MEDICINE

## 2025-04-24 PROCEDURE — 99213 OFFICE O/P EST LOW 20 MIN: CPT | Performed by: FAMILY MEDICINE

## 2025-04-24 ASSESSMENT — FIBROSIS 4 INDEX: FIB4 SCORE: 1.88

## 2025-04-24 ASSESSMENT — ENCOUNTER SYMPTOMS
NAUSEA: 1
EYE REDNESS: 0
WEIGHT LOSS: 0
EYE DISCHARGE: 0
MYALGIAS: 0

## 2025-04-24 NOTE — PROGRESS NOTES
"Subjective     Karuna Reid is a 69 y.o. female who presents with Other (Patient was taking ritalin and stopped taking it 4 days ago due to feeling dizzy, nausea, lethargic, and fatigued. Was instructed by primary to come into UC to be evaluated due to symptoms. )            4 days HA. Nasal congestion. No sinus pressure. No fever. Started Ritalin 3 weeks ago and stopped 4 days ago. Concerned this may contributing to symptoms. Mild cough/sneeze. No other aggravating or alleviating factors.          Review of Systems   Constitutional:  Positive for malaise/fatigue. Negative for weight loss.   Eyes:  Negative for discharge and redness.   Gastrointestinal:  Positive for nausea.   Musculoskeletal:  Negative for joint pain and myalgias.   Skin:  Negative for itching and rash.              Objective     /70   Pulse 88   Temp 37 °C (98.6 °F) (Temporal)   Resp 18   Ht 1.6 m (5' 3\")   Wt 64.9 kg (143 lb)   SpO2 96%   BMI 25.33 kg/m²      Physical Exam  Constitutional:       General: She is not in acute distress.     Appearance: She is well-developed.   HENT:      Head: Normocephalic and atraumatic.      Right Ear: Tympanic membrane normal.      Left Ear: Tympanic membrane normal.      Nose: Congestion present.      Mouth/Throat:      Mouth: Mucous membranes are moist.   Eyes:      Conjunctiva/sclera: Conjunctivae normal.   Cardiovascular:      Rate and Rhythm: Normal rate and regular rhythm.      Heart sounds: Normal heart sounds. No murmur heard.  Pulmonary:      Effort: Pulmonary effort is normal.      Breath sounds: Normal breath sounds. No wheezing.   Skin:     General: Skin is warm and dry.      Findings: No rash.   Neurological:      Mental Status: She is alert.                  1. Acute nonintractable headache, unspecified headache type        2. Nasal congestion          Differential diagnosis, natural history, supportive care, and indications for immediate follow-up were discussed.     Suspect viral " upper respiratory infection.  Would be somewhat unusual presentation for Ritalin withdrawal.  If related to Ritalin withdrawal no evidence of systemic effects with normal vital signs.

## 2025-06-13 ENCOUNTER — OFFICE VISIT (OUTPATIENT)
Dept: SLEEP MEDICINE | Facility: MEDICAL CENTER | Age: 70
End: 2025-06-13
Attending: NURSE PRACTITIONER
Payer: MEDICARE

## 2025-06-13 VITALS
OXYGEN SATURATION: 95 % | RESPIRATION RATE: 14 BRPM | HEART RATE: 74 BPM | WEIGHT: 141 LBS | HEIGHT: 63 IN | DIASTOLIC BLOOD PRESSURE: 50 MMHG | BODY MASS INDEX: 24.98 KG/M2 | SYSTOLIC BLOOD PRESSURE: 98 MMHG

## 2025-06-13 DIAGNOSIS — G47.19 EXCESSIVE DAYTIME SLEEPINESS: Primary | ICD-10-CM

## 2025-06-13 DIAGNOSIS — G47.10 HYPERSOMNOLENCE: ICD-10-CM

## 2025-06-13 PROCEDURE — 99214 OFFICE O/P EST MOD 30 MIN: CPT | Performed by: NURSE PRACTITIONER

## 2025-06-13 PROCEDURE — 3074F SYST BP LT 130 MM HG: CPT | Performed by: NURSE PRACTITIONER

## 2025-06-13 PROCEDURE — 99213 OFFICE O/P EST LOW 20 MIN: CPT | Performed by: NURSE PRACTITIONER

## 2025-06-13 PROCEDURE — 3078F DIAST BP <80 MM HG: CPT | Performed by: NURSE PRACTITIONER

## 2025-06-13 ASSESSMENT — FIBROSIS 4 INDEX: FIB4 SCORE: 1.88
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
## 2025-08-21 ENCOUNTER — APPOINTMENT (OUTPATIENT)
Dept: BEHAVIORAL HEALTH | Facility: CLINIC | Age: 70
End: 2025-08-21
Payer: MEDICARE

## 2025-09-11 ENCOUNTER — APPOINTMENT (OUTPATIENT)
Dept: DERMATOLOGY | Facility: IMAGING CENTER | Age: 70
End: 2025-09-11
Payer: MEDICARE

## (undated) DEVICE — STOCKINET BIAS 4 IN STERILE - (20/CA)

## (undated) DEVICE — TUBING CLEARLINK DUO-VENT - C-FLO (48EA/CA)

## (undated) DEVICE — SET EXTENSION WITH 2 PORTS (48EA/CA) ***PART #2C8610 IS A SUBSTITUTE*****

## (undated) DEVICE — SET LEADWIRE 5 LEAD BEDSIDE DISPOSABLE ECG (1SET OF 5/EA)

## (undated) DEVICE — PROTECTOR ULNA NERVE - (36PR/CA)

## (undated) DEVICE — TROCAR 5X100 SEPARTATOR ADV - FIXATION (6/BX)

## (undated) DEVICE — ELECTRODE DUAL RETURN W/ CORD - (50/PK)

## (undated) DEVICE — WATER IRRIGATION STERILE 1000ML (12EA/CA)

## (undated) DEVICE — NEEDLE INSFL 120MM 14GA VRRS - (20/BX)

## (undated) DEVICE — NEPTUNE 4 PORT MANIFOLD - (20/PK)

## (undated) DEVICE — GLOVE BIOGEL SZ 7 SURGICAL PF LTX - (50PR/BX 4BX/CA)

## (undated) DEVICE — KIT  I.V. START (100EA/CA)

## (undated) DEVICE — BANDAGE ELASTIC 6 IN X 5 YDS - LATEX FREE (10/BX)

## (undated) DEVICE — SUTURE 4-0 MONOCRYL PLUS PS-2 - 27 INCH (36/BX)

## (undated) DEVICE — PADDING CAST 4 IN X 4 YDS - SOF-ROLL (12RL/BG 6BG/CT)

## (undated) DEVICE — DERMABOND ADVANCED - (12EA/BX)

## (undated) DEVICE — LACTATED RINGERS INJ 1000 ML - (14EA/CA 60CA/PF)

## (undated) DEVICE — PACK LAPAROSCOPY - (1/CA)

## (undated) DEVICE — TUBE CONNECTING SUCTION - CLEAR PLASTIC STERILE 72 IN (50EA/CA)

## (undated) DEVICE — MASK ANESTHESIA ADULT  - (100/CA)

## (undated) DEVICE — TRAY SRGPRP PVP IOD WT PRP - (20/CA)

## (undated) DEVICE — CANISTER SUCTION RIGID RED 1500CC (40EA/CA)

## (undated) DEVICE — WRAP COBAN SELF-ADHERENT 6 IN X  5YDS STERILE TAN (12/CA)

## (undated) DEVICE — ELECTRODE 850 FOAM ADHESIVE - HYDROGEL RADIOTRNSPRNT (50/PK)

## (undated) DEVICE — STOCKINET BIAS 6 IN STERILE - (20/CA)

## (undated) DEVICE — LIGASURE LAPAROSCOPIC 5MM - (6EA/CA)

## (undated) DEVICE — TOWEL STOP TIMEOUT SAFETY FLAG (40EA/CA)

## (undated) DEVICE — SLEEVE VASO CALF MED - (10PR/CA)

## (undated) DEVICE — PAD SANITARY 11IN MAXI IND WRAPPED  (12EA/PK 24PK/CA)

## (undated) DEVICE — SENSOR SPO2 NEO LNCS ADHESIVE (20/BX) SEE USER NOTES

## (undated) DEVICE — SUCTION INSTRUMENT YANKAUER BULBOUS TIP W/O VENT (50EA/CA)

## (undated) DEVICE — TROCAR 5X100 BLADED ADVANCE - FIXATION (6/BX)

## (undated) DEVICE — CANISTER SUCTION 3000ML MECHANICAL FILTER AUTO SHUTOFF MEDI-VAC NONSTERILE LF DISP  (40EA/CA)

## (undated) DEVICE — SUTURE GENERAL

## (undated) DEVICE — KIT ANESTHESIA W/CIRCUIT & 3/LT BAG W/FILTER (20EA/CA)

## (undated) DEVICE — CHLORAPREP 26 ML APPLICATOR - ORANGE TINT(25/CA)

## (undated) DEVICE — CATHETER IV 20 GA X 1-1/4 ---SURG.& SDS ONLY--- (50EA/BX)

## (undated) DEVICE — ARMREST CRADLE FOAM - (2PR/PK 12PR/CA)

## (undated) DEVICE — BANDAGE ELASTIC 4 IN X 5 YDS - LATEX FREE(10/BX 5BX/CA)

## (undated) DEVICE — HEAD HOLDER JUNIOR/ADULT

## (undated) DEVICE — GOWN WARMING STANDARD FLEX - (30/CA)

## (undated) DEVICE — SODIUM CHL IRRIGATION 0.9% 1000ML (12EA/CA)

## (undated) DEVICE — GOWN SURGEONS X-LARGE - DISP. (30/CA)

## (undated) DEVICE — PADDING CAST 6 IN X 4 YDS - SOF-ROLL (6RL/BG 6BG/CA)

## (undated) DEVICE — PACK LOWER EXTREMITY - (2/CA)